# Patient Record
Sex: FEMALE | Race: BLACK OR AFRICAN AMERICAN | Employment: OTHER | URBAN - METROPOLITAN AREA
[De-identification: names, ages, dates, MRNs, and addresses within clinical notes are randomized per-mention and may not be internally consistent; named-entity substitution may affect disease eponyms.]

---

## 2017-03-01 ENCOUNTER — OFFICE VISIT (OUTPATIENT)
Dept: INTERNAL MEDICINE CLINIC | Age: 72
End: 2017-03-01

## 2017-03-01 VITALS
OXYGEN SATURATION: 97 % | HEART RATE: 69 BPM | TEMPERATURE: 97.7 F | WEIGHT: 176.8 LBS | BODY MASS INDEX: 30.19 KG/M2 | RESPIRATION RATE: 16 BRPM | HEIGHT: 64 IN | DIASTOLIC BLOOD PRESSURE: 75 MMHG | SYSTOLIC BLOOD PRESSURE: 135 MMHG

## 2017-03-01 DIAGNOSIS — I11.9 HYPERTENSIVE HEART DISEASE WITHOUT HEART FAILURE: Chronic | ICD-10-CM

## 2017-03-01 DIAGNOSIS — E03.9 HYPOTHYROIDISM, UNSPECIFIED TYPE: Chronic | ICD-10-CM

## 2017-03-01 DIAGNOSIS — E11.9 CONTROLLED TYPE 2 DIABETES MELLITUS WITHOUT COMPLICATION, WITHOUT LONG-TERM CURRENT USE OF INSULIN (HCC): Primary | Chronic | ICD-10-CM

## 2017-03-01 LAB
GLUCOSE POC: 100 MG/DL
HBA1C MFR BLD HPLC: 5.8 %

## 2017-03-01 NOTE — PROGRESS NOTES
Chief Complaint   Patient presents with    Cholesterol Problem    Diabetes    Hypertension    Thyroid Problem       Pt preferred language for health care discussion is english. Is someone accompanying this pt? no    Is the patient using any DME equipment during OV? no    Depression Screening completed. Yes    Learning Assessment completed. Yes    Abuse Screening completed. Yes    Health Maintenance reviewed and discussed per provider. Yes    Pt is due for  Diabetic eye exam.  Please order/place referral if appropriate. Advance Directive:  1. Do you have an advance directive in place? Patient Reply:no    2. If not, would you like material regarding how to put one in place? Patient Reply: No    Coordination of Care:  1. Have you been to the ER, urgent care clinic since your last visit? Hospitalized since your last visit? no    2. Have you seen or consulted any other health care providers outside of the [address] [place] [address] since your last visit? Include any pap smears or colon screening.  no

## 2017-03-01 NOTE — PROGRESS NOTES
HISTORY OF PRESENT ILLNESS  Yogi Mccormack is a 66 y.o. female. Visit Vitals    /75    Pulse 69    Temp 97.7 °F (36.5 °C) (Oral)    Resp 16    Ht 5' 4\" (1.626 m)    Wt 176 lb 12.8 oz (80.2 kg)    SpO2 97%    BMI 30.35 kg/m2       Cholesterol Problem   The history is provided by the patient. This is a chronic problem. The current episode started more than 1 week ago. The problem occurs daily. The problem has not changed since onset. Exacerbated by: diet. The symptoms are relieved by medications (diet). Treatments tried: see meds. The treatment provided moderate relief. Diabetes   The history is provided by the patient. This is a chronic problem. The current episode started more than 1 week ago. The problem occurs daily. The problem has not changed since onset. Exacerbated by: diet. The symptoms are relieved by medications (diet). Treatments tried: see med list.   Hypertension    The history is provided by the patient. This is a chronic problem. The current episode started more than 1 week ago. The problem has not changed since onset. Malaise/fatigue: some fatigue. Associated symptoms comments: None  . There are no associated agents to hypertension. Risk factors include postmenopause and diabetes mellitus. Thyroid Problem   The history is provided by the patient. This is a chronic problem. The current episode started more than 1 week ago. The problem occurs daily. The symptoms are relieved by medications. Treatments tried: synthroid. The treatment provided moderate relief. Review of Systems   Constitutional: Negative. Malaise/fatigue: some fatigue. Respiratory: Negative. Cardiovascular: Negative. Neurological: Negative. Physical Exam   Constitutional: She is oriented to person, place, and time. She appears well-developed and well-nourished. No distress. Cardiovascular: Normal rate.     Pulmonary/Chest: Effort normal.   Neurological: She is alert and oriented to person, place, and time.   Skin: Skin is warm and dry. She is not diaphoretic. Psychiatric: She has a normal mood and affect. Nursing note and vitals reviewed. ASSESSMENT and PLAN    ICD-10-CM ICD-9-CM    1. Controlled type 2 diabetes mellitus without complication, without long-term current use of insulin (HCC) E11.9 250.00 AMB POC HEMOGLOBIN A1C      AMB POC GLUCOSE BLOOD, BY GLUCOSE MONITORING DEVICE   2. Hypothyroidism, unspecified type E03.9 244.9    3. Hypertensive heart disease without heart failure I11.9 402.90        BS has been diet controlled. She monitors at [facility] once a month--last time was 74. BP controlled. Last 2 thyroids and chol have been good    Will be having blood at the nephrologists office also    F/u 4 months         mid-day, with HBA1c 5.6 (even better than last). Can be a little more liberal with diet--advised her is it OK to be a bit more liberal with her diet.

## 2017-03-01 NOTE — MR AVS SNAPSHOT
Visit Information Date & Time Provider Department Dept. Phone Encounter #  
 3/1/2017  3:45 PM [address] [phone] [id number] Follow-up Instructions Return in about 4 months (around 7/1/2017) for HTN, DM, cholesterol, thryoid disorder. Upcoming Health Maintenance Date Due  
 EYE EXAM RETINAL OR DILATED Q1 12/3/2016 HEMOGLOBIN A1C Q6M 5/1/2017 FOOT EXAM Q1 7/28/2017 MICROALBUMIN Q1 7/28/2017 LIPID PANEL Q1 11/1/2017 MEDICARE YEARLY EXAM 11/2/2017 GLAUCOMA SCREENING Q2Y 12/3/2017 BREAST CANCER SCRN MAMMOGRAM 3/24/2018 COLONOSCOPY 7/12/2023 DTaP/Tdap/Td series (2 - Td) 5/19/2025 Allergies as of 3/1/2017  Review Complete On: 3/1/2017 By: Eric Lantigua MD  
  
 Severity Noted Reaction Type Reactions Amoxicillin  12/18/2014    Rash Ampicillin  03/04/2015    Rash Codeine  11/15/2011   Side Effect Nausea and Vomiting Tussionex  01/15/2014   Side Effect Other (comments) Hallucinations Valium [Diazepam]  11/15/2011   Systemic Vertigo Current Immunizations  Reviewed on 1/27/2014 Name Date Influenza Vaccine 9/30/2013 10:40 AM  
 Pneumococcal Conjugate (PCV-13) 11/1/2016 11:59 AM  
 Pneumococcal Polysaccharide (PPSV-23) 1/27/2014  9:20 AM  
 Tdap 5/19/2015 Not reviewed this visit You Were Diagnosed With   
  
 Codes Comments Controlled type 2 diabetes mellitus without complication, without long-term current use of insulin ([facility] [address]    -  Primary ICD-10-CM: E11.9 ICD-9-CM: 250.00 Hypothyroidism, unspecified type     ICD-10-CM: E03.9 ICD-9-CM: 244.9 Hypertensive heart disease without heart failure     ICD-10-CM: I11.9 ICD-9-CM: 402.90 Vitals BP  
  
  
  
  
  
 135/75 BMI and BSA Data Body Mass Index Body Surface Area  
 30.35 kg/m 2 1.9 m 2 Preferred Pharmacy Pharmacy Name Phone [facility], [address] [phone] Your Updated Medication List  
  
   
This list is accurate as of: 3/1/17  4:45 PM.  Always use your most recent med list.  
  
  
  
  
 BABY ASPIRIN PO Take 81 mg by mouth.  
  
 ergocalciferol 50,000 unit capsule Commonly known as:  ERGOCALCIFEROL Take 50,000 Units by mouth.  
  
 loratadine 10 mg tablet Commonly known as:  Jearline Araseli Take 1 Tab by mouth daily. metoprolol tartrate 50 mg tablet Commonly known as:  LOPRESSOR Take one tablet by mouth one time daily  
  
 simvastatin 40 mg tablet Commonly known as:  ZOCOR  
TAKE ONE TABLET BY MOUTH IN THE EVENING  
  
 SINGULAIR PO Take  by mouth. spironolactone 50 mg tablet Commonly known as:  ALDACTONE Take 1 Tab by mouth daily. SYNTHROID 100 mcg tablet Generic drug:  levothyroxine Take 1 Tab by mouth daily. vitamin E 400 unit capsule Commonly known as:  [address] Take  by mouth daily. We Performed the Following AMB POC GLUCOSE BLOOD, BY GLUCOSE MONITORING DEVICE [89905 CPT(R)] AMB POC HEMOGLOBIN A1C [23050 CPT(R)] Follow-up Instructions Return in about 4 months (around 7/1/2017) for HTN, DM, cholesterol, thryoid disorder. Introducing [facility] & HEALTH SERVICES! Dear Santiago Wells: Thank you for requesting a [employer] account. Our records indicate that you already have an active [employer] account. You can access your account anytime at [email or web address] Did you know that you can access your hospital and ER discharge instructions at any time in [employer]? You can also review all of your test results from your hospital stay or ER visit. Additional Information If you have questions, please visit the Frequently Asked Questions section of the [employer] website at [email or web address] Remember, [employer] is NOT to be used for urgent needs. For medical emergencies, dial 911. Now available from your iPhone and Android! Please provide this summary of care documentation to your next provider. Your primary care clinician is listed as [facility]. If you have any questions after today's visit, please call [phone].

## 2017-03-11 ENCOUNTER — OFFICE VISIT (OUTPATIENT)
Dept: INTERNAL MEDICINE CLINIC | Age: 72
End: 2017-03-11

## 2017-03-11 VITALS
BODY MASS INDEX: 30.73 KG/M2 | RESPIRATION RATE: 16 BRPM | HEIGHT: 64 IN | WEIGHT: 180 LBS | DIASTOLIC BLOOD PRESSURE: 76 MMHG | HEART RATE: 67 BPM | OXYGEN SATURATION: 99 % | SYSTOLIC BLOOD PRESSURE: 129 MMHG | TEMPERATURE: 96.8 F

## 2017-03-11 DIAGNOSIS — S16.1XXA NECK MUSCLE STRAIN, INITIAL ENCOUNTER: Primary | ICD-10-CM

## 2017-03-11 RX ORDER — CYCLOBENZAPRINE HCL 10 MG
10 TABLET ORAL
Qty: 7 TAB | Refills: 0 | Status: SHIPPED | OUTPATIENT
Start: 2017-03-11 | End: 2017-12-05

## 2017-03-11 NOTE — MR AVS SNAPSHOT
Visit Information Date & Time Provider Department Dept. Phone Encounter #  
 3/11/2017 10:15 AM Tenisha Carmona, [address] [facility] [place] [id number] Follow-up Instructions Return if symptoms worsen or fail to improve. Your Appointments 7/3/2017  9:30 AM  
Office Visit with Waldemar Summers, [address] ([facility]) Appt Note: 4 month f/u HTN, cholestrol, thyroid disoder [address]  
  
   
 [address] [facility] Upcoming Health Maintenance Date Due  
 EYE EXAM RETINAL OR DILATED Q1 12/3/2016 FOOT EXAM Q1 7/28/2017 MICROALBUMIN Q1 7/28/2017 HEMOGLOBIN A1C Q6M 9/1/2017 LIPID PANEL Q1 11/1/2017 MEDICARE YEARLY EXAM 11/2/2017 GLAUCOMA SCREENING Q2Y 12/3/2017 BREAST CANCER SCRN MAMMOGRAM 3/24/2018 COLONOSCOPY 7/12/2023 DTaP/Tdap/Td series (2 - Td) 5/19/2025 Allergies as of 3/11/2017  Review Complete On: 3/11/2017 By: Tenisha Carmona DO Severity Noted Reaction Type Reactions Amoxicillin  12/18/2014    Rash Ampicillin  03/04/2015    Rash Codeine  11/15/2011   Side Effect Nausea and Vomiting Tussionex  01/15/2014   Side Effect Other (comments) Hallucinations Valium [Diazepam]  11/15/2011   Systemic Vertigo Current Immunizations  Reviewed on 1/27/2014 Name Date Influenza Vaccine 9/30/2013 10:40 AM  
 Pneumococcal Conjugate (PCV-13) 11/1/2016 11:59 AM  
 Pneumococcal Polysaccharide (PPSV-23) 1/27/2014  9:20 AM  
 Tdap 5/19/2015 Not reviewed this visit You Were Diagnosed With   
  
 Codes Comments Neck muscle strain, initial encounter    -  Primary ICD-10-CM: S16. Domingo Carbajal ICD-9-CM: 624. 0 Vitals BP Pulse Temp Resp Height(growth percentile) Weight(growth percentile)  129/76 (BP 1 Location: Left arm, BP Patient Position: Sitting) 67 96.8 °F (36 °C) (Oral) 16 5' 4\" (1.626 m) 180 lb (81.6 kg) SpO2 BMI OB Status Smoking Status 99% 30.9 kg/m2 Hysterectomy Never Smoker Vitals History BMI and BSA Data Body Mass Index Body Surface Area 30.9 kg/m 2 1.92 m 2 Preferred Pharmacy Pharmacy Name Phone [facility],[address], [address] [phone] Your Updated Medication List  
  
   
This list is accurate as of: 3/11/17 11:13 AM.  Always use your most recent med list.  
  
  
  
  
 BABY ASPIRIN PO Take 81 mg by mouth. cyclobenzaprine 10 mg tablet Commonly known as:  FLEXERIL Take 1 Tab by mouth nightly. Indications: MUSCLE SPASM  
  
 ergocalciferol 50,000 unit capsule Commonly known as:  ERGOCALCIFEROL Take 50,000 Units by mouth every seven (7) days. metoprolol tartrate 50 mg tablet Commonly known as:  LOPRESSOR Take one tablet by mouth one time daily  
  
 simvastatin 40 mg tablet Commonly known as:  ZOCOR  
TAKE ONE TABLET BY MOUTH IN THE EVENING  
  
 spironolactone 50 mg tablet Commonly known as:  ALDACTONE Take 1 Tab by mouth daily. SYNTHROID 100 mcg tablet Generic drug:  levothyroxine Take 1 Tab by mouth daily. Prescriptions Sent to Pharmacy Refills  
 cyclobenzaprine (FLEXERIL) 10 mg tablet 0 Sig: Take 1 Tab by mouth nightly. Indications: MUSCLE SPASM Class: Normal  
 Pharmacy: [facility] [address]  #: [phone] Route: Oral  
  
Follow-up Instructions Return if symptoms worsen or fail to improve. Patient Instructions Recommend Tylenol 1000mg every 6 hours for pain. Recommend OTC Epsom salt rub gel for pain/inflammation ([employer]ure brand or equivalent). Return in 4 weeks if symptoms persist or sooner if working. Neck Strain or Sprain: Rehab Exercises Your Care Instructions Here are some examples of typical rehabilitation exercises for your condition. Start each exercise slowly. Ease off the exercise if you start to have pain. Your doctor or physical therapist will tell you when you can start these exercises and which ones will work best for you. How to do the exercises Neck rotation 1. Sit in a firm chair, or stand up straight. 2. Keeping your chin level, turn your head to the right, and hold for 15 to 30 seconds. 3. Turn your head to the left and hold for 15 to 30 seconds. 4. Repeat 2 to 4 times to each side. Neck stretches 1. Look straight ahead, and tip your right ear to your right shoulder. Do not let your left shoulder rise up as you tip your head to the right. 2. Hold for 15 to 30 seconds. 3. Tilt your head to the left. Do not let your right shoulder rise up as you tip your head to the left. 4. Hold for 15 to 30 seconds. 5. Repeat 2 to 4 times to each side. Forward neck flexion 1. Sit in a firm chair, or stand up straight. 2. Bend your head forward. 3. Hold for 15 to 30 seconds. 4. Repeat 2 to 4 times. Lateral (side) bend strengthening 1. With your right hand, place your first two fingers on your right temple. 2. Start to bend your head to the side while using gentle pressure from your fingers to keep your head from bending. 3. Hold for about 6 seconds. 4. Repeat 8 to 12 times. 5. Switch hands and repeat the same exercise on your left side. Forward bend strengthening 1. Place your first two fingers of either hand on your forehead. 2. Start to bend your head forward while using gentle pressure from your fingers to keep your head from bending. 3. Hold for about 6 seconds. 4. Repeat 8 to 12 times. Neutral position strengthening 1. Using one hand, place your fingertips on the back of your head at the top of your neck. 2. Start to bend your head backward while using gentle pressure from your fingers to keep your head from bending. 3. Hold for about 6 seconds. 4. Repeat 8 to 12 times. Chin tuck 1. Lie on the floor with a rolled-up towel under your neck. Your head should be touching the floor. 2. Slowly bring your chin toward your chest. 
3. Hold for a count of 6, and then relax for up to 10 seconds. 4. Repeat 8 to 12 times. Follow-up care is a key part of your treatment and safety. Be sure to make and go to all appointments, and call your doctor if you are having problems. It's also a good idea to know your test results and keep a list of the medicines you take. Where can you learn more? Go to http://amne-isabella.info/. Enter M679 in the search box to learn more about \"Neck Strain or Sprain: Rehab Exercises. \" Current as of: May 23, 2016 Content Version: 11.1 © 5321-9434 [employer]. Care instructions adapted under license by [employer] (which disclaims liability or warranty for this information). If you have questions about a medical condition or this instruction, always ask your healthcare professional. [place] [address] any warranty or liability for your use of this information. Neck Strain: Care Instructions Your Care Instructions You have strained the muscles and ligaments in your neck. A sudden, awkward movement can strain the neck. This often occurs with falls or car accidents or during certain sports. Everyday activities like working on a computer or sleeping can also cause neck strain if they force you to hold your neck in an awkward position for a long time. It is common for neck pain to get worse for a day or two after an injury, but it should start to feel better after that. You may have more pain and stiffness for several days before it gets better. This is expected. It may take a few weeks or longer for it to heal completely. Good home treatment can help you get better faster and avoid future neck problems. Follow-up care is a key part of your treatment and safety.  Be sure to make and go to all appointments, and call your doctor if you are having problems. It's also a good idea to know your test results and keep a list of the medicines you take. How can you care for yourself at home? · If you were given a neck brace (cervical collar) to limit neck motion, wear it as instructed for as many days as your doctor tells you to. Do not wear it longer than you were told to. Wearing a brace for too long can make neck stiffness worse and weaken the neck muscles. · You can try using heat or ice to see if it helps. ¨ Try using a heating pad on a low or medium setting for 15 to 20 minutes every 2 to 3 hours. Try a warm shower in place of one session with the heating pad. You can also buy single-use heat wraps that last up to 8 hours. ¨ You can also try an ice pack for 10 to 15 minutes every 2 to 3 hours. · Take pain medicines exactly as directed. ¨ If the doctor gave you a prescription medicine for pain, take it as prescribed. ¨ If you are not taking a prescription pain medicine, ask your doctor if you can take an over-the-counter medicine. · Gently rub the area to relieve pain and help with blood flow. Do not massage the area if it hurts to do so. · Do not do anything that makes the pain worse. Take it easy for a couple of days. You can do your usual activities if they do not hurt your neck or put it at risk for more stress or injury. · Try sleeping on a special neck pillow. Place it under your neck, not under your head. Placing a tightly rolled-up towel under your neck while you sleep will also work. If you use a neck pillow or rolled towel, do not use your regular pillow at the same time. · To prevent future neck pain, do exercises to stretch and strengthen your neck and back. Learn how to use good posture, safe lifting techniques, and proper body mechanics. When should you call for help? Call 911 anytime you think you may need emergency care. For example, call if: · You are unable to move an arm or a leg at all. Call your doctor now or seek immediate medical care if: 
· You have new or worse symptoms in your arms, legs, chest, belly, or buttocks. Symptoms may include: ¨ Numbness or tingling. ¨ Weakness. ¨ Pain. · You lose bladder or bowel control. Watch closely for changes in your health, and be sure to contact your doctor if: 
· You are not getting better as expected. Where can you learn more? Go to http://mane-isabella.info/. Enter M253 in the search box to learn more about \"Neck Strain: Care Instructions. \" Current as of: May 23, 2016 Content Version: 11.1 © 4865-4730 [employer]. Care instructions adapted under license by [employer] (which disclaims liability or warranty for this information). If you have questions about a medical condition or this instruction, always ask your healthcare professional. [place] [address] any warranty or liability for your use of this information. Introducing [facility] & HEALTH SERVICES! Dear Terra Expose: Thank you for requesting a [facility] [facility] account. Our records indicate that you already have an active [facility] [facility] account. You can access your account anytime at [email or web address] Did you know that you can access your hospital and ER discharge instructions at any time in [employer]? You can also review all of your test results from your hospital stay or ER visit. Additional Information If you have questions, please visit the Frequently Asked Questions section of the [employer] website at [email or web address] Remember, [facility] [facility] is NOT to be used for urgent needs. For medical emergencies, dial 911. Now available from your iPhone and Android! Please provide this summary of care documentation to your next provider. Your primary care clinician is listed as [facility].  If you have any questions after today's visit, please call [phone].

## 2017-03-11 NOTE — PROGRESS NOTES
Patient presents today with C/O neck, patient reports she woke up with neck pain yesterday morning. Pt preferred language for healthcare discussion is english. Do you have an advanced directive? No  Is someone accompanying this pt? If so who? No   Is the patient using any DME equipment during OV? No What equipment? 1. Have you been to the ER, urgent care clinic since your last visit? Hospitalized since your last visit?  No

## 2017-03-11 NOTE — PATIENT INSTRUCTIONS
Recommend Tylenol 1000mg every 6 hours for pain. Recommend OTC Epsom salt rub gel for pain/inflammation (Procure brand or equivalent). Return in 4 weeks if symptoms persist or sooner if working. Neck Strain or Sprain: Rehab Exercises  Your Care Instructions  Here are some examples of typical rehabilitation exercises for your condition. Start each exercise slowly. Ease off the exercise if you start to have pain. Your doctor or physical therapist will tell you when you can start these exercises and which ones will work best for you. How to do the exercises  Neck rotation    1. Sit in a firm chair, or stand up straight. 2. Keeping your chin level, turn your head to the right, and hold for 15 to 30 seconds. 3. Turn your head to the left and hold for 15 to 30 seconds. 4. Repeat 2 to 4 times to each side. Neck stretches    1. Look straight ahead, and tip your right ear to your right shoulder. Do not let your left shoulder rise up as you tip your head to the right. 2. Hold for 15 to 30 seconds. 3. Tilt your head to the left. Do not let your right shoulder rise up as you tip your head to the left. 4. Hold for 15 to 30 seconds. 5. Repeat 2 to 4 times to each side. Forward neck flexion    1. Sit in a firm chair, or stand up straight. 2. Bend your head forward. 3. Hold for 15 to 30 seconds. 4. Repeat 2 to 4 times. Lateral (side) bend strengthening    1. With your right hand, place your first two fingers on your right temple. 2. Start to bend your head to the side while using gentle pressure from your fingers to keep your head from bending. 3. Hold for about 6 seconds. 4. Repeat 8 to 12 times. 5. Switch hands and repeat the same exercise on your left side. Forward bend strengthening    1. Place your first two fingers of either hand on your forehead. 2. Start to bend your head forward while using gentle pressure from your fingers to keep your head from bending. 3. Hold for about 6 seconds.   4. Repeat 8 to 12 times. Neutral position strengthening    1. Using one hand, place your fingertips on the back of your head at the top of your neck. 2. Start to bend your head backward while using gentle pressure from your fingers to keep your head from bending. 3. Hold for about 6 seconds. 4. Repeat 8 to 12 times. Chin tuck    1. Lie on the floor with a rolled-up towel under your neck. Your head should be touching the floor. 2. Slowly bring your chin toward your chest.  3. Hold for a count of 6, and then relax for up to 10 seconds. 4. Repeat 8 to 12 times. Follow-up care is a key part of your treatment and safety. Be sure to make and go to all appointments, and call your doctor if you are having problems. It's also a good idea to know your test results and keep a list of the medicines you take. Where can you learn more? Go to http://mane-isabella.info/. Enter M679 in the search box to learn more about \"Neck Strain or Sprain: Rehab Exercises. \"  Current as of: May 23, 2016  Content Version: 11.1  © 0476-4412 [employer]. Care instructions adapted under license by [employer] (which disclaims liability or warranty for this information). If you have questions about a medical condition or this instruction, always ask your healthcare professional. [address] any warranty or liability for your use of this information. Neck Strain: Care Instructions  Your Care Instructions  You have strained the muscles and ligaments in your neck. A sudden, awkward movement can strain the neck. This often occurs with falls or car accidents or during certain sports. Everyday activities like working on a computer or sleeping can also cause neck strain if they force you to hold your neck in an awkward position for a long time. It is common for neck pain to get worse for a day or two after an injury, but it should start to feel better after that.  You may have more pain and stiffness for several days before it gets better. This is expected. It may take a few weeks or longer for it to heal completely. Good home treatment can help you get better faster and avoid future neck problems. Follow-up care is a key part of your treatment and safety. Be sure to make and go to all appointments, and call your doctor if you are having problems. It's also a good idea to know your test results and keep a list of the medicines you take. How can you care for yourself at home? · If you were given a neck brace (cervical collar) to limit neck motion, wear it as instructed for as many days as your doctor tells you to. Do not wear it longer than you were told to. Wearing a brace for too long can make neck stiffness worse and weaken the neck muscles. · You can try using heat or ice to see if it helps. ¨ Try using a heating pad on a low or medium setting for 15 to 20 minutes every 2 to 3 hours. Try a warm shower in place of one session with the heating pad. You can also buy single-use heat wraps that last up to 8 hours. ¨ You can also try an ice pack for 10 to 15 minutes every 2 to 3 hours. · Take pain medicines exactly as directed. ¨ If the doctor gave you a prescription medicine for pain, take it as prescribed. ¨ If you are not taking a prescription pain medicine, ask your doctor if you can take an over-the-counter medicine. · Gently rub the area to relieve pain and help with blood flow. Do not massage the area if it hurts to do so. · Do not do anything that makes the pain worse. Take it easy for a couple of days. You can do your usual activities if they do not hurt your neck or put it at risk for more stress or injury. · Try sleeping on a special neck pillow. Place it under your neck, not under your head. Placing a tightly rolled-up towel under your neck while you sleep will also work. If you use a neck pillow or rolled towel, do not use your regular pillow at the same time.   · To prevent future neck pain, do exercises to stretch and strengthen your neck and back. Learn how to use good posture, safe lifting techniques, and proper body mechanics. When should you call for help? Call 911 anytime you think you may need emergency care. For example, call if:  · You are unable to move an arm or a leg at all. Call your doctor now or seek immediate medical care if:  · You have new or worse symptoms in your arms, legs, chest, belly, or buttocks. Symptoms may include:  ¨ Numbness or tingling. ¨ Weakness. ¨ Pain. · You lose bladder or bowel control. Watch closely for changes in your health, and be sure to contact your doctor if:  · You are not getting better as expected. Where can you learn more? Go to http://mane-isabella.info/. Enter M253 in the search box to learn more about \"Neck Strain: Care Instructions. \"  Current as of: May 23, 2016  Content Version: 11.1  © 1813-1808 [employer]. Care instructions adapted under license by [employer] (which disclaims liability or warranty for this information). If you have questions about a medical condition or this instruction, always ask your healthcare professional. [address] any warranty or liability for your use of this information.

## 2017-03-11 NOTE — PROGRESS NOTES
SUBJECTIVE:   HPI:  Ashley Araujo is a 66 y.o. female who complains of neck pain. Patient states that she woke up yesterday morning with neck pain and stiffness. Denies any trauma/falls. Tried heat some heat, and tylenol yesterday. Still having pain and stiffness. Limited ROM due to pain. ROS:    · General: negative for chills, fever, weight changes or malaise  · Respiratory: no cough, shortness of breath, or wheezing  · Cardiovascular: no chest pain, palpitations, or dyspnea on exertion  · Gastrointestinal: no GERD or history of PUD, abdominal pain, N/V, change in bowel habits, or black or bloody stools  · Musculoskeletal: no muscle weakness  · Neurological: no numbness, tingling, headache or dizziness    Past Medical History:   Diagnosis Date    CRI (chronic renal insufficiency)     stage 3    Diabetes mellitus, type 2 ([facility] [address] 2/2016    by HBA1c    Disease of inner ear     GERD (gastroesophageal reflux disease) 12/2/2011    Hypercholesterolemia     Hypertension     Hypothyroidism 12/2/2011     Past Surgical History:   Procedure Laterality Date    BIOPSY OF BREAST, INCISIONAL  1995    left    HX BREAST REDUCTION      bilateral    HX CARPAL TUNNEL RELEASE      bilateral     HX COLONOSCOPY  7/12/13    no f/u, Dr. Marizol Jarvis      right tympanicplastty    HX HYSTERECTOMY      partial    HX ORTHOPAEDIC      right ulna    HX ORTHOPAEDIC      bilateral trigger finger releases     Outpatient Prescriptions Marked as Taking for the 3/11/17 encounter (Office Visit) with Ruthy Campbell, DO   Medication Sig Dispense Refill    spironolactone (ALDACTONE) 50 mg tablet Take 1 Tab by mouth daily. 90 Tab 3    metoprolol tartrate (LOPRESSOR) 50 mg tablet Take one tablet by mouth one time daily 90 Tab 3    SYNTHROID 100 mcg tablet Take 1 Tab by mouth daily. 90 Tab 5    ergocalciferol (ERGOCALCIFEROL) 50,000 unit capsule Take 50,000 Units by mouth every seven (7) days.       simvastatin (ZOCOR) 40 mg tablet TAKE ONE TABLET BY MOUTH IN THE EVENING  90 Tab 5    BABY ASPIRIN PO Take 81 mg by mouth. Allergies   Allergen Reactions    Amoxicillin Rash    Ampicillin Rash    Codeine Nausea and Vomiting    Tussionex Other (comments)     Hallucinations    Valium [Diazepam] Vertigo       OBJECTIVE:  Visit Vitals    /76 (BP 1 Location: Left arm, BP Patient Position: Sitting)    Pulse 67    Temp 96.8 °F (36 °C) (Oral)    Resp 16    Ht 5' 4\" (1.626 m)    Wt 180 lb (81.6 kg)    SpO2 99%    BMI 30.9 kg/m2      GEN: awake, alert, orientated, in no acute distress  NECK: No lymphadenopathy, no appearing thyroid, tenderness to left trapezius, spasm  CV:  The heart sounds are regular in rate and rhythm. There is a normal S1 and S2. There or no murmurs, rubs, or gallops. Distal pulses are intact and equal. No peripheral edema. Lungs:  Lung sounds are clear and equal to auscultation throughout all lung fields. ASSESSMENT/PLAN:     1. Neck muscle strain, initial encounter - no trauma. Recommend Tylenol 1000mg every 6 hours for pain. Recommend OTC Epsom salt rub gel for pain/inflammation (Procure brand or equivalent). Gave handout for neck strain rehab exercises. Trial of flexeril at bedtime for pain/sleep. Return in 4 weeks if symptoms persist or sooner if working.   - cyclobenzaprine (FLEXERIL) 10 mg tablet; Take 1 Tab by mouth nightly. Indications: MUSCLE SPASM  Dispense: 7 Tab;  Refill: 0

## 2017-06-12 DIAGNOSIS — Z76.0 MEDICATION REFILL: ICD-10-CM

## 2017-06-12 RX ORDER — LEVOTHYROXINE SODIUM 100 UG/1
100 TABLET ORAL DAILY
Qty: 90 TAB | Refills: 5 | Status: SHIPPED | OUTPATIENT
Start: 2017-06-12 | End: 2017-12-27 | Stop reason: SDUPTHER

## 2017-06-12 RX ORDER — SIMVASTATIN 40 MG/1
TABLET, FILM COATED ORAL
Qty: 90 TAB | Refills: 5 | Status: SHIPPED | OUTPATIENT
Start: 2017-06-12 | End: 2018-06-16 | Stop reason: SDUPTHER

## 2017-06-12 NOTE — TELEPHONE ENCOUNTER
Requested Prescriptions     Pending Prescriptions Disp Refills    simvastatin (ZOCOR) 40 mg tablet 90 Tab 5    SYNTHROID 100 mcg tablet 90 Tab 5     Sig: Take 1 Tab by mouth daily.

## 2017-07-13 ENCOUNTER — OFFICE VISIT (OUTPATIENT)
Dept: INTERNAL MEDICINE CLINIC | Age: 72
End: 2017-07-13

## 2017-07-13 VITALS
BODY MASS INDEX: 31.18 KG/M2 | HEART RATE: 59 BPM | TEMPERATURE: 97.9 F | DIASTOLIC BLOOD PRESSURE: 54 MMHG | SYSTOLIC BLOOD PRESSURE: 127 MMHG | RESPIRATION RATE: 18 BRPM | WEIGHT: 182.6 LBS | HEIGHT: 64 IN | OXYGEN SATURATION: 98 %

## 2017-07-13 DIAGNOSIS — I11.9 HYPERTENSIVE HEART DISEASE WITHOUT HEART FAILURE: Chronic | ICD-10-CM

## 2017-07-13 DIAGNOSIS — E78.00 HYPERCHOLESTEROLEMIA: Chronic | ICD-10-CM

## 2017-07-13 DIAGNOSIS — E03.9 HYPOTHYROIDISM, UNSPECIFIED TYPE: Chronic | ICD-10-CM

## 2017-07-13 DIAGNOSIS — Z76.0 MEDICATION REFILL: ICD-10-CM

## 2017-07-13 DIAGNOSIS — E11.9 CONTROLLED TYPE 2 DIABETES MELLITUS WITHOUT COMPLICATION, WITHOUT LONG-TERM CURRENT USE OF INSULIN (HCC): Primary | Chronic | ICD-10-CM

## 2017-07-13 DIAGNOSIS — R55 PRE-SYNCOPE: ICD-10-CM

## 2017-07-13 RX ORDER — METOPROLOL TARTRATE 50 MG/1
TABLET ORAL
Qty: 90 TAB | Refills: 3 | Status: SHIPPED | OUTPATIENT
Start: 2017-07-13 | End: 2018-06-19 | Stop reason: SDUPTHER

## 2017-07-13 RX ORDER — LORATADINE 10 MG/1
10 TABLET ORAL
COMMUNITY
End: 2019-01-29 | Stop reason: SDUPTHER

## 2017-07-13 RX ORDER — MONTELUKAST SODIUM 10 MG/1
TABLET ORAL
Refills: 12 | COMMUNITY
Start: 2017-05-30 | End: 2018-11-07

## 2017-07-13 RX ORDER — SPIRONOLACTONE 50 MG/1
50 TABLET, FILM COATED ORAL DAILY
Qty: 90 TAB | Refills: 3 | Status: SHIPPED | OUTPATIENT
Start: 2017-07-13 | End: 2018-06-19 | Stop reason: SDUPTHER

## 2017-07-13 NOTE — PROGRESS NOTES
HISTORY OF PRESENT ILLNESS  Armani Gandhi is a 66 y.o. female. Visit Vitals    /54    Pulse (!) 59    Temp 97.9 °F (36.6 °C) (Oral)    Resp 18    Ht 5' 4\" (1.626 m)    Wt 182 lb 9.6 oz (82.8 kg)    SpO2 98%    BMI 31.34 kg/m2       HPI Comments: Pt has h/o diabetes but is on no meds. Her last HBA1c was marginal (as in nearly normal). She has had 2-3 episodes of lightheadedness and feeling queasy when she had not eaten regularly. She does not check her BS--can not get past the thought of sticking herself. Cholesterol Problem   The history is provided by the patient. This is a chronic problem. The current episode started more than 1 week ago. The problem occurs daily. The problem has not changed since onset. Exacerbated by: diet. Relieved by: diet. Diabetes   The history is provided by the patient (see comments). This is a chronic problem. The problem occurs daily. The problem has been resolved. Exacerbated by: diet. Relieved by: diet. Treatments tried: diet only. The treatment provided significant relief. Hypertension    The history is provided by the patient. This is a chronic problem. The current episode started more than 1 week ago. The problem has not changed since onset. Associated symptoms include palpitations and dizziness. There are no associated agents to hypertension. Risk factors include obesity, postmenopause, hypertension, diabetes mellitus and dyslipidemia. Review of Systems   Constitutional: Negative. HENT: Negative. Respiratory: Negative. Cardiovascular: Positive for palpitations. Gastrointestinal:        Queasiness when she did not eat. Neurological: Positive for dizziness. Physical Exam   Constitutional: She is oriented to person, place, and time. She appears well-developed and well-nourished. No distress. Cardiovascular: Normal rate and regular rhythm.     Pulmonary/Chest: Effort normal and breath sounds normal.   Musculoskeletal: She exhibits no edema. Neurological: She is alert and oriented to person, place, and time. Skin: Skin is warm and dry. She is not diaphoretic. Psychiatric: She has a normal mood and affect. Nursing note and vitals reviewed. ASSESSMENT and PLAN    ICD-10-CM ICD-9-CM    1. Controlled type 2 diabetes mellitus without complication, without long-term current use of insulin ([facility]) E87.4 524.94 METABOLIC PANEL, COMPREHENSIVE      LIPID PANEL      HEMOGLOBIN A1C W/O EAG   2. Hypertensive heart disease without heart failure I11.9 402.90 MAGNESIUM      AMB POC EKG ROUTINE W/ 12 LEADS, INTER & REP   3. Hypothyroidism, unspecified type E03.9 244.9 THYROID CASCADE PROFILE   4. Hypercholesterolemia E78.00 272.0 LIPID PANEL      AMB POC EKG ROUTINE W/ 12 LEADS, INTER & REP   5. Pre-syncope R55 780.2 MAGNESIUM      CBC WITH AUTOMATED DIFF      AMB POC EKG ROUTINE W/ 12 LEADS, INTER & REP     I suspect her BS was a little low due to heat and activity and heat    Will  Update lab and EKG.  Consider cardiac stress test if sxs continue    Discussed BMI/weight, lifestyle, diet and exercise    F/u 4 months for MWV            ECG--no significant change from 9/23/14

## 2017-07-13 NOTE — PROGRESS NOTES
Chief Complaint   Patient presents with    Cholesterol Problem    Diabetes     Pt states that her sugars have been dropping. wonders if she is not eatting enough, or waiting too long between meals. Also states that she is getting dizzy and having balance issues    Hypertension       Pt preferred language for health care discussion is [place]. Is someone accompanying this pt? no    Is the patient using any DME equipment during OV? no    Depression Screening:  PHQ over the last two weeks 7/13/2017 3/11/2017 3/1/2017 7/28/2016 11/23/2015 11/17/2014 1/15/2014   Little interest or pleasure in doing things Not at all Not at all Not at all Not at all Not at all Not at all Not at all   Feeling down, depressed or hopeless Not at all Not at all Not at all Not at all Several days Several days Not at all   Total Score PHQ 2 0 0 0 0 1 1 0       Learning Assessment:  Learning Assessment 5/19/2015   PRIMARY LEARNER Patient   HIGHEST LEVEL OF EDUCATION - PRIMARY LEARNER  > [facility] PRIMARY LEARNER NONE   CO-LEARNER CAREGIVER No   PRIMARY LANGUAGE ENGLISH   LEARNER PREFERENCE PRIMARY READING     DEMONSTRATION   ANSWERED BY Patient   RELATIONSHIP SELF       Abuse Screening:  Abuse Screening Questionnaire 5/19/2015   Do you ever feel afraid of your partner? N   Are you in a relationship with someone who physically or mentally threatens you? N   Is it safe for you to go home? Y       Fall Risk  Fall Risk Assessment, last 12 mths 7/13/2017 3/11/2017 3/1/2017 7/28/2016 11/23/2015 11/17/2014 1/15/2014   Able to walk? Yes Yes Yes Yes Yes Yes Yes   Fall in past 12 months? No No No No No No No         Health Maintenance reviewed and discussed per provider. Yes    Pt is due for Diabetic eye exam  Please order/place referral if appropriate. Advance Directive:  1. Do you have an advance directive in place? Patient Reply:no    2. If not, would you like material regarding how to put one in place?  Patient Reply: no    Coordination of Care:  1. Have you been to the ER, urgent care clinic since your last visit? Hospitalized since your last visit? no    2. Have you seen or consulted any other health care providers outside of the [address] [place] [address] since your last visit? Include any pap smears or colon screening.  no

## 2017-07-13 NOTE — MR AVS SNAPSHOT
Visit Information Date & Time Provider Department Dept. Phone Encounter #  
 7/13/2017  9:00 AM [address] [phone] [id number] Follow-up Instructions Return in about 4 months (around 11/13/2017) for Medicare Wellness Visit, HTN, DM, cholesterol. Upcoming Health Maintenance Date Due  
 EYE EXAM RETINAL OR DILATED Q1 12/3/2016 FOOT EXAM Q1 7/28/2017 MICROALBUMIN Q1 7/28/2017 INFLUENZA AGE 9 TO ADULT 8/1/2017 HEMOGLOBIN A1C Q6M 9/1/2017 LIPID PANEL Q1 11/1/2017 MEDICARE YEARLY EXAM 11/2/2017 GLAUCOMA SCREENING Q2Y 12/3/2017 BREAST CANCER SCRN MAMMOGRAM 3/27/2018 COLONOSCOPY 7/12/2023 DTaP/Tdap/Td series (2 - Td) 5/19/2025 Allergies as of 7/13/2017  Review Complete On: 7/13/2017 By: Bria Gillis MD  
  
 Severity Noted Reaction Type Reactions Amoxicillin  12/18/2014    Rash Ampicillin  03/04/2015    Rash Codeine  11/15/2011   Side Effect Nausea and Vomiting Tussionex  01/15/2014   Side Effect Other (comments) Hallucinations Valium [Diazepam]  11/15/2011   Systemic Vertigo Current Immunizations  Reviewed on 1/27/2014 Name Date Influenza Vaccine 9/30/2013 10:40 AM  
 Pneumococcal Conjugate (PCV-13) 11/1/2016 11:59 AM  
 Pneumococcal Polysaccharide (PPSV-23) 1/27/2014  9:20 AM  
 Tdap 5/19/2015 Not reviewed this visit You Were Diagnosed With   
  
 Codes Comments Controlled type 2 diabetes mellitus without complication, without long-term current use of insulin [facility]    -  Primary ICD-10-CM: E11.9 ICD-9-CM: 250.00 Hypertensive heart disease without heart failure     ICD-10-CM: I11.9 ICD-9-CM: 402.90 Hypothyroidism, unspecified type     ICD-10-CM: E03.9 ICD-9-CM: 244.9 Hypercholesterolemia     ICD-10-CM: E78.00 ICD-9-CM: 272.0 Pre-syncope     ICD-10-CM: R55 
ICD-9-CM: 780.2 Medication refill     ICD-10-CM: Z76.0 ICD-9-CM: V68.1 Vitals BP Pulse Temp Resp Height(growth percentile) Weight(growth percentile) 127/54 (!) 59 97.9 °F (36.6 °C) (Oral) 18 5' 4\" (1.626 m) 182 lb 9.6 oz (82.8 kg) SpO2 BMI OB Status Smoking Status 98% 31.34 kg/m2 Hysterectomy Never Smoker BMI and BSA Data Body Mass Index Body Surface Area  
 31.34 kg/m 2 1.93 m 2 Preferred Pharmacy Pharmacy Name Phone [place] [phone] [phone] Your Updated Medication List  
  
   
This list is accurate as of: 7/13/17 10:10 AM.  Always use your most recent med list.  
  
  
  
  
 BABY ASPIRIN PO Take 81 mg by mouth. cyclobenzaprine 10 mg tablet Commonly known as:  FLEXERIL Take 1 Tab by mouth nightly. Indications: MUSCLE SPASM  
  
 ergocalciferol 50,000 unit capsule Commonly known as:  ERGOCALCIFEROL Take 50,000 Units by mouth every seven (7) days. loratadine 10 mg tablet Commonly known as:  Rubin [place] Take 10 mg by mouth.  
  
 metoprolol tartrate 50 mg tablet Commonly known as:  LOPRESSOR Take one tablet by mouth one time daily  
  
 montelukast 10 mg tablet Commonly known as:  SINGULAIR  
TAKE 1 TABLET BY MOUTH NIGHTLY AT BEDTIME  
  
 simvastatin 40 mg tablet Commonly known as:  ZOCOR  
TAKE ONE TABLET BY MOUTH IN THE EVENING  
  
 spironolactone 50 mg tablet Commonly known as:  ALDACTONE Take 1 Tab by mouth daily. SYNTHROID 100 mcg tablet Generic drug:  levothyroxine Take 1 Tab by mouth daily. Prescriptions Sent to Pharmacy Refills  
 spironolactone (ALDACTONE) 50 mg tablet 3 Sig: Take 1 Tab by mouth daily. Class: Normal  
 [facility] [address] #: [phone] Route: Oral  
 metoprolol tartrate (LOPRESSOR) 50 mg tablet 3 Sig: Take one tablet by mouth one time daily  Class: Normal  
 Pharmacy: [facility] [address] [facility] [place] Ph #: [phone] We Performed the Following AMB POC EKG ROUTINE W/ 12 LEADS, INTER & REP [19939 CPT(R)] Follow-up Instructions Return in about 4 months (around 11/13/2017) for Medicare Wellness Visit, HTN, DM, cholesterol. To-Do List   
 07/13/2017 Lab:  CBC WITH AUTOMATED DIFF   
  
 07/13/2017 Lab:  HEMOGLOBIN A1C W/O EAG   
  
 07/13/2017 Lab:  LIPID PANEL   
  
 07/13/2017 Lab:  MAGNESIUM   
  
 07/13/2017 Lab:  METABOLIC PANEL, COMPREHENSIVE   
  
 07/13/2017 Lab:  THYROID CASCADE PROFILE Introducing [facility]! Dear Anastasia Blum: Thank you for requesting a [employer] account. Our records indicate that you already have an active [employer] account. You can access your account anytime at [email or web address] Did you know that you can access your hospital and ER discharge instructions at any time in [employer]? You can also review all of your test results from your hospital stay or ER visit. Additional Information If you have questions, please visit the Frequently Asked Questions section of the [employer] website at [email or web address] Remember, [employer] is NOT to be used for urgent needs. For medical emergencies, dial 911. Now available from your iPhone and Android! Please provide this summary of care documentation to your next provider. Your primary care clinician is listed as [facility]. If you have any questions after today's visit, please call [phone].

## 2017-07-25 ENCOUNTER — TELEPHONE (OUTPATIENT)
Dept: INTERNAL MEDICINE CLINIC | Age: 72
End: 2017-07-25

## 2017-07-26 NOTE — TELEPHONE ENCOUNTER
Spoke with patient confirmed name and . Relayed message below, pt stated that she will be in tomorrow morning.        Be advised

## 2017-07-27 ENCOUNTER — HOSPITAL ENCOUNTER (OUTPATIENT)
Dept: LAB | Age: 72
Discharge: HOME | End: 2017-07-27
Payer: MEDICARE

## 2017-07-27 ENCOUNTER — LAB ONLY (OUTPATIENT)
Dept: INTERNAL MEDICINE CLINIC | Age: 72
End: 2017-07-27

## 2017-07-27 DIAGNOSIS — E11.9 CONTROLLED TYPE 2 DIABETES MELLITUS WITHOUT COMPLICATION, WITHOUT LONG-TERM CURRENT USE OF INSULIN (HCC): Chronic | ICD-10-CM

## 2017-07-27 DIAGNOSIS — E03.9 HYPOTHYROIDISM, UNSPECIFIED TYPE: Chronic | ICD-10-CM

## 2017-07-27 DIAGNOSIS — R55 PRE-SYNCOPE: ICD-10-CM

## 2017-07-27 DIAGNOSIS — E78.00 HYPERCHOLESTEROLEMIA: Chronic | ICD-10-CM

## 2017-07-27 DIAGNOSIS — I11.9 HYPERTENSIVE HEART DISEASE WITHOUT HEART FAILURE: Chronic | ICD-10-CM

## 2017-07-27 LAB
ALBUMIN SERPL BCP-MCNC: 3.8 G/DL (ref 3.4–5)
ALBUMIN/GLOB SERPL: 1 {RATIO} (ref 0.8–1.7)
ALP SERPL-CCNC: 55 U/L (ref 45–117)
ALT SERPL-CCNC: 19 U/L (ref 13–56)
ANION GAP BLD CALC-SCNC: 10 MMOL/L (ref 3–18)
AST SERPL W P-5'-P-CCNC: 16 U/L (ref 15–37)
BASOPHILS # BLD AUTO: 0 K/UL (ref 0–0.06)
BASOPHILS # BLD: 0 % (ref 0–2)
BILIRUB SERPL-MCNC: 0.5 MG/DL (ref 0.2–1)
BUN SERPL-MCNC: 11 MG/DL (ref 7–18)
BUN/CREAT SERPL: 9 (ref 12–20)
CALCIUM SERPL-MCNC: 9.4 MG/DL (ref 8.5–10.1)
CHLORIDE SERPL-SCNC: 108 MMOL/L (ref 100–108)
CHOLEST SERPL-MCNC: 179 MG/DL
CO2 SERPL-SCNC: 26 MMOL/L (ref 21–32)
CREAT SERPL-MCNC: 1.21 MG/DL (ref 0.6–1.3)
DIFFERENTIAL METHOD BLD: ABNORMAL
EOSINOPHIL # BLD: 0.3 K/UL (ref 0–0.4)
EOSINOPHIL NFR BLD: 6 % (ref 0–5)
ERYTHROCYTE [DISTWIDTH] IN BLOOD BY AUTOMATED COUNT: 15.1 % (ref 11.6–14.5)
GLOBULIN SER CALC-MCNC: 3.7 G/DL (ref 2–4)
GLUCOSE SERPL-MCNC: 112 MG/DL (ref 74–99)
HBA1C MFR BLD: 6.4 % (ref 4.2–5.6)
HCT VFR BLD AUTO: 46.1 % (ref 35–45)
HDLC SERPL-MCNC: 47 MG/DL (ref 40–60)
HDLC SERPL: 3.8 {RATIO} (ref 0–5)
HGB BLD-MCNC: 14.8 G/DL (ref 12–16)
LDLC SERPL CALC-MCNC: 95.8 MG/DL (ref 0–100)
LIPID PROFILE,FLP: ABNORMAL
LYMPHOCYTES # BLD AUTO: 44 % (ref 21–52)
LYMPHOCYTES # BLD: 2.3 K/UL (ref 0.9–3.6)
MAGNESIUM SERPL-MCNC: 2 MG/DL (ref 1.6–2.6)
MCH RBC QN AUTO: 27.6 PG (ref 24–34)
MCHC RBC AUTO-ENTMCNC: 32.1 G/DL (ref 31–37)
MCV RBC AUTO: 86 FL (ref 74–97)
MONOCYTES # BLD: 0.4 K/UL (ref 0.05–1.2)
MONOCYTES NFR BLD AUTO: 7 % (ref 3–10)
NEUTS SEG # BLD: 2.3 K/UL (ref 1.8–8)
NEUTS SEG NFR BLD AUTO: 43 % (ref 40–73)
PLATELET # BLD AUTO: 176 K/UL (ref 135–420)
PMV BLD AUTO: 10.9 FL (ref 9.2–11.8)
POTASSIUM SERPL-SCNC: 3.9 MMOL/L (ref 3.5–5.5)
PROT SERPL-MCNC: 7.5 G/DL (ref 6.4–8.2)
RBC # BLD AUTO: 5.36 M/UL (ref 4.2–5.3)
SODIUM SERPL-SCNC: 144 MMOL/L (ref 136–145)
TRIGL SERPL-MCNC: 181 MG/DL (ref ?–150)
TSH W FREE THYROID I,TSHELE: 2.34 UIU/ML (ref 0.36–3.74)
VLDLC SERPL CALC-MCNC: 36.2 MG/DL
WBC # BLD AUTO: 5.3 K/UL (ref 4.6–13.2)

## 2017-07-27 PROCEDURE — 83036 HEMOGLOBIN GLYCOSYLATED A1C: CPT | Performed by: INTERNAL MEDICINE

## 2017-07-27 PROCEDURE — 85025 COMPLETE CBC W/AUTO DIFF WBC: CPT | Performed by: INTERNAL MEDICINE

## 2017-07-27 PROCEDURE — 80061 LIPID PANEL: CPT | Performed by: INTERNAL MEDICINE

## 2017-07-27 PROCEDURE — 83735 ASSAY OF MAGNESIUM: CPT | Performed by: INTERNAL MEDICINE

## 2017-07-27 PROCEDURE — 80053 COMPREHEN METABOLIC PANEL: CPT | Performed by: INTERNAL MEDICINE

## 2017-07-27 PROCEDURE — 84443 ASSAY THYROID STIM HORMONE: CPT | Performed by: INTERNAL MEDICINE

## 2017-07-27 PROCEDURE — 36415 COLL VENOUS BLD VENIPUNCTURE: CPT | Performed by: INTERNAL MEDICINE

## 2017-10-23 ENCOUNTER — HOSPITAL ENCOUNTER (OUTPATIENT)
Dept: PHYSICAL THERAPY | Age: 72
Discharge: HOME | End: 2017-10-23
Payer: MEDICARE

## 2017-10-23 PROCEDURE — 97163 PT EVAL HIGH COMPLEX 45 MIN: CPT

## 2017-10-23 PROCEDURE — 97112 NEUROMUSCULAR REEDUCATION: CPT

## 2017-10-23 PROCEDURE — G8978 MOBILITY CURRENT STATUS: HCPCS

## 2017-10-23 PROCEDURE — G8979 MOBILITY GOAL STATUS: HCPCS

## 2017-10-23 NOTE — PROGRESS NOTES
[facility] PHYSICAL THERAPY AT [facility], [address] [facility] [address] - Phone: [phone]  Fax: [phone] / [address] [address] PHYSICAL THERAPY SERVICES  Patient Name: Bridger Knight :    Medical   Diagnosis: Dizziness [R42] Treatment Diagnosis: Dizziness [R42]   Onset Date: 2017     Referral Source: Lashawn Mcgrath MD Start of Care [facility] [place]): 10/23/2017   Prior Hospitalization: See medical history Provider #: [id number]   Prior Level of Function: Chronic symptoms for past 15 years   Comorbidities: DM, Arthritis, CKD   Medications: Verified on Patient Summary List   The Plan of Care and following information is based on the information from the initial evaluation.    ===========================================================================================  Assessment / key information:  Patient is 67 y.o. yo female who presents to [facility] with diagnosis of Dizziness [R42]. Patient reports dizziness, imbalance, near falls and nausea which first began 15 years ago. She denies falls and vertigo. She has had vestibular rehab multiple times in the past with poor outcome. She has a cane but has not been using it. Upon objective evaluation, patient demonstrates Impaired use of sensory input and balance strategies. She can walk intermittently without support but is not able to stand unsupported. Patient scored 6 on DGI indicating increased risk of fall with ambulation. Lower extremity muscle strength was impaired in both hips at 3/5. Patient scored 72 on FOTO indcating decreased quality of life.   Patient can benefit from PT interventions to decrease r/o fall, improve safety with ADLs, & decrease sx with ADLs & to improve overall functional status.  ==================================================================================  Eval Complexity: History: HIGH Complexity :3+ comorbidities / personal factors will impact the outcome/ POC Exam:HIGH Complexity : 4+ Standardized tests and measures addressing body structure, function, activity limitation and / or participation in recreation  Presentation: HIGH Complexity : Unstable and unpredictable characteristics  Clinical Decision Making:HIGH Complexity : FOTO score of 1- 25 Overall Complexity:HIGH   Problem List: decrease strength, impaired gait/ balance, decrease ADL/ functional abilitiies, decrease activity tolerance, decrease flexibility/ joint mobility and decrease transfer abilities  Treatment Plan may include any combination of the following: Therapeutic exercise, Therapeutic activities, Neuromuscular re-education, Physical agent/modality, Gait/balance training, Manual therapy and Patient education  Patient / Family readiness to learn indicated by: asking questions, trying to perform skills and interest  Persons(s) to be included in education: patient (P)  Barriers to Learning/Limitations: yes; Not compliant with HEP in the past.  Measures taken: Discuss importance of performing daily HEP. Patient Goal (s): \"Awareness of what I need do fo to avoid PT in the future\"   Patient self reported health status: good  Rehabilitation Potential: poor  Short Term Goals: To be accomplished in  4  Weeks:  1) Patient performing daily HEP and educated in fall prevention techniques. 2) Patient will be able to maintain stand unsupported for 30 seconds eyes open to increase safety with ADLs. 3) Patient able to perform 5 repititions of cervical rotation, flexion and extension with no > mild symptoms. 4) Patient to score 45 on DHI indicating improved function. 5) Increase bilateral hip flexion/abduction to 3+/5 to facilitate standing.  Long Term Goals: To be accomplished in  8  Weeks:  1) Patient to improve score on FOTO to 70 indicating improved quality of life. 2) Patient to improve score on DGI to 9/24 indicating decreased fall risk with ambulation.      3) Patient to be independent  with HEP. 4) Patient to score 50 on [facility] [address] indicating improved function. Frequency / Duration:   Patient to be seen  1  times per week for 8  weeks:  Patient / Caregiver education and instruction: activity modification and exercises  G-Codes (GP): Mobility [id number] Current  CL= 60-79%   Goal  CL= 60-79%. The severity rating is based on the Other DGI    Therapist Signature: Dayanara Osborn PT Date: 33/76/5667   Certification Period: 10/23/2017 to 01/22/2018 Time: 3:15 PM   ===========================================================================================  I certify that the above Physical Therapy Services are being furnished while the patient is under my care. I agree with the treatment plan and certify that this therapy is necessary. Physician Signature:        Date:       Time:     Please sign and return to [facility] or you may fax the signed copy to [phone]. Thank you.

## 2017-10-23 NOTE — PROGRESS NOTES
PHYSICAL THERAPY - DAILY TREATMENT NOTE    Patient Name: Romayne Hanlon        Date: 10/23/2017  : 1945   YES Patient  Verified  Visit #:   1   of   8  Insurance: Payor: [employer]s / Plan: [place] MEDICARE PART A & B / Product Type: Medicare /      In time: 2:32 Out time: 3:07   Total Treatment Time: 35     Medicare Time Tracking (below)   Total Timed Codes (min):  10 1:1 Treatment Time:  10     TREATMENT AREA =  Dizziness [R42]    SUBJECTIVE  Pain Level (on 0 to 10 scale):  0  / 10   Medication Changes/New allergies or changes in medical history, any new surgeries or procedures? NO    If yes, update Summary List   Subjective Functional Status/Changes:  []  No changes reported     See medical history          OBJECTIVE      10 min Patient Education:  YES  Reviewed HEP/POC/Goals   []  Progressed/Changed HEP based on: Other Objective/Functional Measures:    See POC     Post Treatment Pain Level (on 0 to 10) scale:   0  / 10     ASSESSMENT  Assessment/Changes in Function:     Justification for Eval Code Complexity:  Patient History : Chronic, DM, CKD, arthritis  Examination see exam   Clinical Presentation: unstable  Clinical Decision Making : [address] [address]       []  See Progress Note/Recertification   Patient will continue to benefit from skilled PT services to modify and progress therapeutic interventions, address functional mobility deficits, address strength deficits, address imbalance/dizziness, instruct in home and community integration and address increased fall risk to attain remaining goals. Progress toward goals / Updated goals:    Initial evaluation completed with home exercise program and education initiated.        PLAN  [x]  Upgrade activities as tolerated YES Continue plan of care   []  Discharge due to :    []  Other:      Therapist: Juan Francisco Minor PT    Date: 10/23/2017 Time: 2:29 PM     Future Appointments  Date Time Provider Arielle Guerrero   10/23/2017 2:30 PM Juan Francisco Minor PT [facility]   11/13/2017 9:30 AM Mary Jane Powell [place] [address] [facility]

## 2017-11-01 ENCOUNTER — HOSPITAL ENCOUNTER (OUTPATIENT)
Dept: PHYSICAL THERAPY | Age: 72
Discharge: HOME | End: 2017-11-01
Payer: MEDICARE

## 2017-11-01 PROCEDURE — 97110 THERAPEUTIC EXERCISES: CPT

## 2017-11-01 NOTE — PROGRESS NOTES
PHYSICAL THERAPY - DAILY TREATMENT NOTE    Patient Name: Hal Motley        Date: 2017  : 1945   YES Patient  Verified  Visit #:   2   of   8  Insurance: Payor: [place] MEDICARE / Plan: VA MEDICARE PART A & B / Product Type: Medicare /      In time: 3:13 pm Out time: 3:46 pm   Total Treatment Time: 33     Medicare Time Tracking (below)   Total Timed Codes (min):  33 1:1 Treatment Time:  33     TREATMENT AREA =  Dizziness [R42]    SUBJECTIVE  Pain Level (on 0 to 10 scale):  2/ 10   Medication Changes/New allergies or changes in medical history, any new surgeries or procedures? NO    If yes, update Summary List   Subjective Functional Status/Changes:  []  No changes reported     \"OK but since last week I have had a lot more ringing and buzzing in my ears. \" pt reports she is not sure if it is from the weather change. Pt reports performing HEP every day but one. OBJECTIVE  Modalities Rationale: na       33 min Therapeutic Exercise:  [x]  See flow sheet   Rationale:      increase ROM, improve coordination, improve balance and increase proprioception to improve the patients ability to decrease fall risk       min Patient Education:  YES  Reviewed HEP   []  Progressed/Changed HEP based on: Other Objective/Functional Measures:    Reviewed written HEP  Modified hold count to 3 seconds for hip ABD/ADD, hold band for sit hip flexion  Add CS flex/ext, B rotation 3x each-produced mild increase in dizziness  Static standing against wall, sit EC, sit to stand- mid-modA     Post Treatment Pain Level (on 0 to 10) scale:   2 / 10     ASSESSMENT  Assessment/Changes in Function:   Pt requires close S in static standing ~ 13 seconds. Min-ModA with sit to stand for proper weight shift forward and Up. Moderate VCs for trunk lean forward and pushing thorough LE into standing. Recommended use of SPC -prefer walker for increased safety. Pt deferred use of AD at this time.  Modified HEP secondary to soreness through B hips since initial visit. []  See Progress Note/Recertification   Patient will continue to benefit from skilled PT services to modify and progress therapeutic interventions, address imbalance/dizziness and instruct in home and community integration to attain remaining goals. Progress toward goals / Updated goals:    First visit from initial evaluation.  Progressed treatment per plan of care     PLAN  []  Upgrade activities as tolerated YES Continue plan of care   []  Discharge due to :    []  Other:      Therapist: Faisal Flores PTA    Date: 11/1/2017 Time: 3:46  PM     Future Appointments  Date Time Provider Arielle Guerrero   11/9/2017 3:00 PM Faisal Flores PTA [facility]   11/13/2017 9:30 AM Alessandro Goodwin MD [address] [facility]   11/16/2017 3:00 PM Faisal lFores PTA [facility]   11/21/2017 3:00 PM Launie Saint, PT [facility]   11/28/2017 3:00 PM Launie Saint, PT [facility]   12/5/2017 3:00 PM Faisal Flores PTA [facility]   12/12/2017 3:00 PM Launie Saint, PT [facility]

## 2017-11-09 ENCOUNTER — HOSPITAL ENCOUNTER (OUTPATIENT)
Dept: PHYSICAL THERAPY | Age: 72
Discharge: HOME | End: 2017-11-09
Payer: MEDICARE

## 2017-11-09 PROCEDURE — 97110 THERAPEUTIC EXERCISES: CPT

## 2017-11-09 PROCEDURE — 97140 MANUAL THERAPY 1/> REGIONS: CPT

## 2017-11-13 ENCOUNTER — OFFICE VISIT (OUTPATIENT)
Dept: INTERNAL MEDICINE CLINIC | Age: 72
End: 2017-11-13

## 2017-11-13 VITALS
DIASTOLIC BLOOD PRESSURE: 72 MMHG | HEIGHT: 64 IN | SYSTOLIC BLOOD PRESSURE: 130 MMHG | BODY MASS INDEX: 31.76 KG/M2 | WEIGHT: 186 LBS | RESPIRATION RATE: 20 BRPM | HEART RATE: 57 BPM | TEMPERATURE: 96.6 F | OXYGEN SATURATION: 98 %

## 2017-11-13 DIAGNOSIS — E03.9 HYPOTHYROIDISM, UNSPECIFIED TYPE: Chronic | ICD-10-CM

## 2017-11-13 DIAGNOSIS — R06.00 DYSPNEA, UNSPECIFIED TYPE: ICD-10-CM

## 2017-11-13 DIAGNOSIS — Z00.00 MEDICARE ANNUAL WELLNESS VISIT, SUBSEQUENT: Primary | ICD-10-CM

## 2017-11-13 DIAGNOSIS — I11.9 HYPERTENSIVE HEART DISEASE WITHOUT HEART FAILURE: Chronic | ICD-10-CM

## 2017-11-13 DIAGNOSIS — E78.00 HYPERCHOLESTEROLEMIA: Chronic | ICD-10-CM

## 2017-11-13 DIAGNOSIS — E11.9 CONTROLLED TYPE 2 DIABETES MELLITUS WITHOUT COMPLICATION, WITHOUT LONG-TERM CURRENT USE OF INSULIN (HCC): Chronic | ICD-10-CM

## 2017-11-13 RX ORDER — ALBUTEROL SULFATE 90 UG/1
2 AEROSOL, METERED RESPIRATORY (INHALATION)
Qty: 1 INHALER | Refills: 1 | Status: SHIPPED | OUTPATIENT
Start: 2017-11-13 | End: 2018-11-07

## 2017-11-13 NOTE — PROGRESS NOTES
ROOM # 4    Moriah Galvan presents today for   Chief Complaint   Patient presents with    Annual Wellness Visit       Moriah Galvan preferred language for health care discussion is english/other. Is someone accompanying this pt? no    Is the patient using any DME equipment during OV? no    Depression Screening:  PHQ over the last two weeks 11/13/2017 11/13/2017 7/13/2017 3/11/2017 3/1/2017 7/28/2016 11/23/2015   Little interest or pleasure in doing things More than half the days Not at all Not at all Not at all Not at all Not at all Not at all   Feeling down, depressed or hopeless More than half the days Not at all Not at all Not at all Not at all Not at all Several days   Total Score PHQ 2 4 0 0 0 0 0 1       Learning Assessment:  Learning Assessment 5/19/2015   PRIMARY LEARNER Patient   HIGHEST LEVEL OF EDUCATION - PRIMARY LEARNER  > 4 YEARS Arun PRIMARY LEARNER NONE   CO-LEARNER CAREGIVER No   PRIMARY LANGUAGE ENGLISH   LEARNER PREFERENCE PRIMARY READING     DEMONSTRATION   ANSWERED BY Patient   RELATIONSHIP SELF       Abuse Screening:  Abuse Screening Questionnaire 11/13/2017 5/19/2015   Do you ever feel afraid of your partner? N N   Are you in a relationship with someone who physically or mentally threatens you? N N   Is it safe for you to go home? Y Y       Fall Risk  Fall Risk Assessment, last 12 mths 11/13/2017 7/13/2017 3/11/2017 3/1/2017 7/28/2016 11/23/2015 11/17/2014   Able to walk? Yes Yes Yes Yes Yes Yes Yes   Fall in past 12 months? No No No No No No No       Health Maintenance reviewed and discussed per provider. Yes    Moriah Galvan is due for flu( pt states she got at [employer] in Oct), Microalb, MWV. Please order/place referral if appropriate. Advance Directive:  1. Do you have an advance directive in place? Patient Reply: pt did not want to discuss    2. If not, would you like material regarding how to put one in place? Patient Reply: no    Coordination of Care:  1. Have you been to the ER, urgent care clinic since your last visit? Hospitalized since your last visit? no    2. Have you seen or consulted any other health care providers outside of the [address] [place] [address] since your last visit? Include any pap smears or colon screening.  no

## 2017-11-13 NOTE — MR AVS SNAPSHOT
Visit Information Date & Time Provider Department Dept. Phone Encounter #  
 11/13/2017  9:30 AM Char Mckeon, [address] [id number] Follow-up Instructions Return in about 3 months (around 2/13/2018) for HTN, DM. Upcoming Health Maintenance Date Due MICROALBUMIN Q1 7/28/2017 MEDICARE YEARLY EXAM 11/2/2017 EYE EXAM RETINAL OR DILATED Q1 12/1/2017 HEMOGLOBIN A1C Q6M 1/27/2018 BREAST CANCER SCRN MAMMOGRAM 3/27/2018 LIPID PANEL Q1 7/27/2018 FOOT EXAM Q1 9/18/2018 GLAUCOMA SCREENING Q2Y 12/1/2018 COLONOSCOPY 7/12/2023 DTaP/Tdap/Td series (2 - Td) 5/19/2025 Allergies as of 11/13/2017  Review Complete On: 11/13/2017 By: Char Mckeon MD  
  
 Severity Noted Reaction Type Reactions Amoxicillin  12/18/2014    Rash Ampicillin  03/04/2015    Rash Codeine  11/15/2011   Side Effect Nausea and Vomiting Tussionex  01/15/2014   Side Effect Other (comments) Hallucinations Valium [Diazepam]  11/15/2011   Systemic Vertigo Current Immunizations  Reviewed on 1/27/2014 Name Date Influenza Vaccine 9/30/2013 10:40 AM  
 Pneumococcal Conjugate (PCV-13) 11/1/2016 11:59 AM  
 Pneumococcal Polysaccharide (PPSV-23) 1/27/2014  9:20 AM  
 Tdap 5/19/2015 Not reviewed this visit You Were Diagnosed With   
  
 Codes Comments Medicare annual wellness visit, subsequent    -  Primary ICD-10-CM: Z00.00 ICD-9-CM: V70.0 Controlled type 2 diabetes mellitus without complication, without long-term current use of insulin ([facility] [address]     ICD-10-CM: E11.9 ICD-9-CM: 250.00 Hypertensive heart disease without heart failure     ICD-10-CM: I11.9 ICD-9-CM: 402.90 Hypothyroidism, unspecified type     ICD-10-CM: E03.9 ICD-9-CM: 244.9 Hypercholesterolemia     ICD-10-CM: E78.00 ICD-9-CM: 272.0 Dyspnea, unspecified type     ICD-10-CM: R06.00 
ICD-9-CM: 786.09 Vitals BP Pulse Temp Resp Height(growth percentile) Weight(growth percentile) 130/72 (BP 1 Location: Right arm, BP Patient Position: Sitting) (!) 57 96.6 °F (35.9 °C) (Oral) 20 5' 4\" (1.626 m) 186 lb (84.4 kg) SpO2 BMI OB Status Smoking Status 98% 31.93 kg/m2 Hysterectomy Never Smoker Vitals History BMI and BSA Data Body Mass Index Body Surface Area  
 31.93 kg/m 2 1.95 m 2 Preferred Pharmacy Pharmacy Name Phone [facility], [address] [phone] Your Updated Medication List  
  
   
This list is accurate as of: 11/13/17 10:20 AM.  Always use your most recent med list.  
  
  
  
  
 albuterol 90 mcg/actuation inhaler Commonly known as:  PROVENTIL HFA, VENTOLIN HFA, PROAIR HFA Take 2 Puffs by inhalation every six (6) hours as needed for Wheezing. Indications: BRONCHOSPASM PREVENTION  
  
 BABY ASPIRIN PO Take 81 mg by mouth. cyclobenzaprine 10 mg tablet Commonly known as:  FLEXERIL Take 1 Tab by mouth nightly. Indications: MUSCLE SPASM  
  
 ergocalciferol 50,000 unit capsule Commonly known as:  ERGOCALCIFEROL Take 50,000 Units by mouth every seven (7) days. loratadine 10 mg tablet Commonly known as:  Rosan Erath Take 10 mg by mouth.  
  
 metoprolol tartrate 50 mg tablet Commonly known as:  LOPRESSOR Take one tablet by mouth one time daily  
  
 montelukast 10 mg tablet Commonly known as:  SINGULAIR  
TAKE 1 TABLET BY MOUTH NIGHTLY AT BEDTIME  
  
 simvastatin 40 mg tablet Commonly known as:  ZOCOR  
TAKE ONE TABLET BY MOUTH IN THE EVENING  
  
 spironolactone 50 mg tablet Commonly known as:  ALDACTONE Take 1 Tab by mouth daily. SYNTHROID 100 mcg tablet Generic drug:  levothyroxine Take 1 Tab by mouth daily. Prescriptions Sent to Pharmacy  Refills  
 albuterol (PROVENTIL HFA, VENTOLIN HFA, PROAIR HFA) 90 mcg/actuation inhaler 1  
 Sig: Take 2 Puffs by inhalation every six (6) hours as needed for Wheezing. Indications: BRONCHOSPASM PREVENTION Class: Normal  
 [facility]acy: [facility] [address] [facility]  #: [phone] Route: Inhalation Follow-up Instructions Return in about 3 months (around 2/13/2018) for HTN, DM. To-Do List   
 11/13/2017 ECHO:  2D ECHO COMPLETE ADULT (TTE) W OR WO CONTR   
  
 11/16/2017 3:00 PM  
  Appointment with Leonidas Diaz PTA at [facility]  [facility],[address] ([phone])  
  
 11/21/2017 3:00 PM  
  Appointment with Nader Owen PT at [facility]  [facility],[address] ([phone])  
  
 11/28/2017 3:00 PM  
  Appointment with Nader Owen PT at [facility] PT [address] [facility] ([phone]) 12/05/2017 3:00 PM  
  Appointment with Leonidas Diaz PTA at [facility] PT [address] [facility] ([phone])  
  
 12/12/2017 3:00 PM  
  Appointment with Nader Owen PT at [facility] PT [address] [place] [address] ([phone]) Patient Instructions Medicare Wellness Visit, Female The best way to live healthy is to have a healthy lifestyle by eating a well-balanced diet, exercising regularly, limiting alcohol and stopping smoking. Regular physical exams and screening tests are another way to keep healthy. Preventive exams provided by your health care provider can find health problems before they become diseases or illnesses. Preventive services including immunizations, screening tests, monitoring and exams can help you take care of your own health. All people over age 72 should have a pneumovax  and and a prevnar shot to prevent pneumonia. These are once in a lifetime unless you and your provider decide differently. All people over 65 should have a yearly flu shot and a tetanus vaccine every 10 years. A bone mass density to screen for osteoporosis or thinning of the bones should be done every 2 years after 65. Screening for diabetes mellitus with a blood sugar test should be done every year. Glaucoma is a disease of the eye due to increased ocular pressure that can lead to blindness and it should be done every year by an eye professional. 
 
Cardiovascular screening tests that check for elevated lipids (fatty part of blood) which can lead to heart disease and strokes should be done every 5 years. Colorectal screening that evaluates for blood or polyps in your colon should be done yearly as a stool test or every five years as a flexible sigmoidoscope or every 10 years as a colonoscopy up to age 76. Breast cancer screening with a mammogram is recommended biennially  for women age 54-69. Screening for cervical cancer with a pap smear and pelvic exam is recommended for women after age 72 years every 2 years up to age 79 or when the provider and patient decide to stop. If there is a history of cervical abnormalities or other increased risk for cancer then the test is recommended yearly. Hepatitis C screening is also recommended for anyone born between [birth year] through [address]. A shingles vaccine is also recommended once in a lifetime after age 61. Your Medicare Wellness Exam is recommended annually. Here is a list of your current Health Maintenance items with a due date: 
Health Maintenance Due Topic Date Due  
 Albumin Urine Test  07/28/2017 [facility] Annual Well Visit  11/02/2017 [facility] Eye Exam  12/01/2017 Introducing \[facility]\"" & HEALTH SERVICES! Dear Shari Nelson: Thank you for requesting a [employer] account. Our records indicate that you already have an active [employer] account. You can access your account anytime at [email or web address] Did you know that you can access your hospital and ER discharge instructions at any time in [employer]? You can also review all of your test results from your hospital stay or ER visit. Additional Information If you have questions, please visit the Frequently Asked Questions section of the [employer] website at [email or web address] Remember, [employer] is NOT to be used for urgent needs. For medical emergencies, dial 911. Now available from your iPhone and Android! Please provide this summary of care documentation to your next provider. Your primary care clinician is listed as [facility]. If you have any questions after today's visit, please call [phone].

## 2017-11-13 NOTE — PATIENT INSTRUCTIONS

## 2017-11-13 NOTE — PROGRESS NOTES
HISTORY OF PRESENT ILLNESS  Jaqueline Rodriguez is a 67 y.o. female. Visit Vitals    /72 (BP 1 Location: Right arm, BP Patient Position: Sitting)    Pulse (!) 57    Temp 96.6 °F (35.9 °C) (Oral)    Resp 20    Ht 5' 4\" (1.626 m)    Wt 186 lb (84.4 kg)    SpO2 98%    BMI 31.93 kg/m2       HPI Comments: States she was at a diabetes walk in October. Her HBA1c was down to 6.1. The next day her glucose was 94 fasting. She brought in lab from 8/26/17--see media. Her renal fcn is stable. Her vitamin D was 37.0    Reports under stress, rushing around, cold air, she feels like she can't get her breath. Her last echo was 3 yrs ago and was OK. Her PFT were a year ago and showed very little. Hypertension    The history is provided by the patient. This is a chronic problem. The current episode started more than 1 week ago. The problem has not changed since onset. Pertinent negatives include no chest pain, no palpitations and no shortness of breath. There are no associated agents to hypertension. Risk factors include postmenopause, obesity, stress, dyslipidemia and diabetes mellitus. Diabetes   The history is provided by the patient. This is a chronic problem. The current episode started more than 1 week ago. The problem occurs daily. The problem has been gradually improving. Pertinent negatives include no chest pain and no shortness of breath. Exacerbated by: diet. Relieved by: diet. Treatments tried: see med list. The treatment provided moderate relief. Review of Systems   Constitutional: Negative. Respiratory: Negative for shortness of breath. Cardiovascular: Negative for chest pain, palpitations and leg swelling. Genitourinary: Negative for frequency. Neurological: Negative. Endo/Heme/Allergies: Negative for polydipsia. Physical Exam   Constitutional: She is oriented to person, place, and time. She appears well-developed and well-nourished. No distress.    Cardiovascular: Normal rate and regular rhythm. Pulmonary/Chest: Effort normal and breath sounds normal.   Musculoskeletal: She exhibits no edema. Neurological: She is alert and oriented to person, place, and time. Skin: Skin is warm and dry. She is not diaphoretic. Psychiatric: She has a normal mood and affect. Nursing note and vitals reviewed. ASSESSMENT and PLAN    ICD-10-CM ICD-9-CM    1. Medicare annual wellness visit, subsequent Z00.00 V70.0    2. Controlled type 2 diabetes mellitus without complication, without long-term current use of insulin (HCC) E11.9 250.00    3. Hypertensive heart disease without heart failure I11.9 402.90    4. Hypothyroidism, unspecified type E03.9 244.9    5. Hypercholesterolemia E78.00 272.0    6. Dyspnea, unspecified type R06.00 786.09 2D ECHO COMPLETE ADULT (TTE) W OR WO CONTR      albuterol (PROVENTIL HFA, VENTOLIN HFA, PROAIR HFA) 90 mcg/actuation inhaler       BP controlled    Lab due next visit    Discussed BMI/weight, lifestyle, diet and exercise  She is aware and will work on it. Will try albuterol MDI for the dyspnea.  Update echo    F/u 3 months

## 2017-11-13 NOTE — ACP (ADVANCE CARE PLANNING)
Advance Care Planning (ACP) Provider Conversation Snapshot    Date of ACP Conversation: 11/13/17  Persons included in Conversation:  patient  Length of ACP Conversation in minutes:  <16 minutes (Non-Billable)    Authorized Decision Maker (if patient is incapable of making informed decisions):    This person is:   not desgnated today          For Patients with Decision Making Capacity:   Values/Goals: Exploration of values, goals, and preferences if recovery is not expected, even with continued medical treatment in the event of:  Imminent death  Severe, permanent brain injury    Conversation Outcomes / Follow-Up Plan:   Recommended completion of Advance Directive form after review of ACP materials and conversation with prospective healthcare agent       Pt is reluctant to think about and discuss but acknowledges the importance

## 2017-11-13 NOTE — PROGRESS NOTES
This is a Subsequent Medicare Annual Wellness Exam (AWV) (Performed 12 months after IPPE or effective date of Medicare Part B enrollment)    I have reviewed the patient's medical history in detail and updated the computerized patient record. History     Past Medical History:   Diagnosis Date    CRI (chronic renal insufficiency)     stage 3    Diabetes mellitus, type 2 ([facility] [address] 2/2016    by HBA1c    Disease of inner ear     GERD (gastroesophageal reflux disease) 12/2/2011    Hypercholesterolemia     Hypertension     Hypothyroidism 12/2/2011      Past Surgical History:   Procedure Laterality Date    BIOPSY OF BREAST, INCISIONAL  1995    left    HX BREAST REDUCTION      bilateral    HX CARPAL TUNNEL RELEASE      bilateral     HX COLONOSCOPY  7/12/13    no f/u, Dr. Del Martinez      right tympanicplastty    HX HYSTERECTOMY      partial    HX ORTHOPAEDIC      right ulna    HX ORTHOPAEDIC      bilateral trigger finger releases     Current Outpatient Prescriptions   Medication Sig Dispense Refill    montelukast (SINGULAIR) 10 mg tablet TAKE 1 TABLET BY MOUTH NIGHTLY AT BEDTIME  12    loratadine (CLARITIN) 10 mg tablet Take 10 mg by mouth.  spironolactone (ALDACTONE) 50 mg tablet Take 1 Tab by mouth daily. 90 Tab 3    metoprolol tartrate (LOPRESSOR) 50 mg tablet Take one tablet by mouth one time daily 90 Tab 3    simvastatin (ZOCOR) 40 mg tablet TAKE ONE TABLET BY MOUTH IN THE EVENING 90 Tab 5    SYNTHROID 100 mcg tablet Take 1 Tab by mouth daily. 90 Tab 5    ergocalciferol (ERGOCALCIFEROL) 50,000 unit capsule Take 50,000 Units by mouth every seven (7) days.  BABY ASPIRIN PO Take 81 mg by mouth.  cyclobenzaprine (FLEXERIL) 10 mg tablet Take 1 Tab by mouth nightly. Indications:  MUSCLE SPASM 7 Tab 0     Allergies   Allergen Reactions    Amoxicillin Rash    Ampicillin Rash    Codeine Nausea and Vomiting    Tussionex Other (comments)     Hallucinations    Valium [Diazepam] Vertigo     Family History   Problem Relation Age of Onset    Heart Disease Mother     Diabetes Mother      dialysis    Alcohol abuse Father     Heart defect Father     Alcohol abuse Sister     Heart defect Sister     Diabetes Maternal Aunt     Diabetes Maternal Uncle     Diabetes Maternal Grandmother     Stroke Maternal Grandmother     Diabetes Maternal Aunt     Diabetes Maternal Uncle     Diabetes Daughter     Cancer Daughter     Hypertension Daughter     Migraines Son      Social History   Substance Use Topics    Smoking status: Never Smoker    Smokeless tobacco: Never Used    Alcohol use No     Patient Active Problem List   Diagnosis Code    Cervical strain, acute S16. 1XXA    Post concussion syndrome F07.81    HCVD (hypertensive cardiovascular disease) I11.9    GERD (gastroesophageal reflux disease) K21.9    Hypothyroidism E03.9    Hypercholesterolemia E78.00    Controlled type 2 diabetes mellitus without complication, without long-term current use of insulin (HCC) E11.9       Depression Risk Factor Screening:     PHQ over the last two weeks 11/13/2017   Little interest or pleasure in doing things More than half the days   Feeling down, depressed or hopeless More than half the days   Total Score PHQ 2 4     Alcohol Risk Factor Screening: You do not drink alcohol or very rarely. Functional Ability and Level of Safety:   Hearing Loss  Hearing is good. Pt says she has been tested and told everything was OK. But she still has some concerns    Activities of Daily Living  The home contains: no safety equipment. She says she has lots of little bags around that are potential hazards. Patient does total self care    Fall Risk  Fall Risk Assessment, last 12 mths 11/13/2017   Able to walk? Yes   Fall in past 12 months?  No       Abuse Screen  Patient is not abused    Cognitive Screening   Evaluation of Cognitive Function:  Has your family/caregiver stated any concerns about your memory: no  Normal, Mini Cog test    Patient Care Team   Patient Care Team:  Trino Mobley MD as PCP - General (Internal Medicine)  Benji Schultz DPM (Podiatry)  Catalina Anglin MD (Otolaryngology)  Ora Arechiga MD as Consulting Provider (Ophthalmology)  Damian Cespedes MD as Consulting Provider (Nephrology)    Assessment/Plan   Education and counseling provided:  Are appropriate based on today's review and evaluation    Diagnoses and all orders for this visit:    1. Medicare annual wellness visit, subsequent    2. Controlled type 2 diabetes mellitus without complication, without long-term current use of insulin ([facility] [address]    3. Hypertensive heart disease without heart failure    4. Hypothyroidism, unspecified type    5.  Hypercholesterolemia        Health Maintenance Due   Topic Date Due    MICROALBUMIN Q1  07/28/2017    MEDICARE YEARLY EXAM  11/02/2017    EYE EXAM RETINAL OR DILATED Q1  12/01/2017

## 2017-11-16 ENCOUNTER — HOSPITAL ENCOUNTER (OUTPATIENT)
Dept: PHYSICAL THERAPY | Age: 72
Discharge: HOME | End: 2017-11-16
Payer: MEDICARE

## 2017-11-16 PROCEDURE — 97110 THERAPEUTIC EXERCISES: CPT

## 2017-11-16 NOTE — PROGRESS NOTES
PHYSICAL THERAPY - DAILY TREATMENT NOTE    Patient Name: Bridger Knight        Date: 2017  : 1945   YES Patient  Verified  Visit #:   4   of   8  Insurance: Payor: Lee Dear / Plan: [place] & B / Product Type: Medicare /      In time: 2:43 pm Out time: 3:03 pm   Total Treatment Time: 20     Medicare Time Tracking (below)   Total Timed Codes (min):  20 1:1 Treatment Time:20     TREATMENT AREA =  Dizziness [R42]    SUBJECTIVE  Pain Level (on 0 to 10 scale):  4  / 10   Medication Changes/New allergies or changes in medical history, any new surgeries or procedures? NO    If yes, update Summary List   Subjective Functional Status/Changes:  []  No changes reported     Pt reports Saturday \"I went to a craft show. I hurt afterwards. I guess I notice it more since I've been in therapy. I almost fell while carrying the turkey in. \" pt reports no falls . A few near falls. OBJECTIVE  Modalities Rationale:   Na       20 min Therapeutic Exercise:  [x]  See flow sheet   Rationale:      increase ROM, increase strength, improve coordination, improve balance and increase proprioception to improve the patients ability to decrease fall risk           min Patient Education:  YES  Reviewed HEP   []  Progressed/Changed HEP based on: Other Objective/Functional Measures:    Increase head turns to 5 reps -no sx reported, RTB for hip ABD     Post Treatment Pain Level (on 0 to 10) scale:   4  / 10     ASSESSMENT  Assessment/Changes in Function:   Pt demonstrates inconsistencies in static standing balance stability. Static standing balance in front of corner ~ <3-5 seconds. Pt able to stand and bend to  purse and put on shoulder without LOB. Pt demonstrating improving use of weight shift forward and up with push through LE for sit to stand body mechanics with VCs and tactile cues. Pt demonstrating improving sit to stand technique with last 2 sit to stands.  Mild to moderate fatigue with sitting exercises for LE strengthening. Emphasized short walks throughout day for endurance improvement. Increased [place] head turns to 5 reps with no reports of sx.   []  See Progress Note/Recertification   Patient will continue to benefit from skilled PT services to modify and progress therapeutic interventions, address functional mobility deficits, address ROM deficits, address strength deficits, address imbalance/dizziness and instruct in home and community integration to attain remaining goals. Progress toward goals / Updated goals:  ) Patient performing daily HEP and educated in fall prevention techniques. met goal 11-16-17  2) Patient will be able to maintain stand unsupported for 30 seconds eyes open to increase safety with ADLs.     3) Patient able to perform 5 repititions of cervical rotation, flexion and extension with no > mild symptoms.  progressing  4) Patient to score 45 on DHI indicating improved function.   5) Increase bilateral hip flexion/abduction to 3+/5 to facilitate standing       PLAN  []  Upgrade activities as tolerated YES Continue plan of care   []  Discharge due to :    []  Other:      Therapist: Jaylin Cruz PTA    Date: 11/16/2017 Time: 3:03 PM     Future Appointments  Date Time Provider Arielle Guerrero   11/16/2017 3:00 PM Jaylin Cruz PTA [facility]   11/21/2017 3:00 PM Hereford Cons, PT [facility]   11/28/2017 3:00 PM Lexy Cons, PT [facility]   12/5/2017 3:00 PM Jaylin Cruz PTA [facility]   12/12/2017 3:00 PM Hereford Cons, PT [facility]   2/20/2018 9:30 AM Karla Noel MD [id number] [facility]

## 2017-11-21 ENCOUNTER — HOSPITAL ENCOUNTER (OUTPATIENT)
Dept: PHYSICAL THERAPY | Age: 72
Discharge: HOME | End: 2017-11-21
Payer: MEDICARE

## 2017-11-21 PROCEDURE — 97112 NEUROMUSCULAR REEDUCATION: CPT

## 2017-11-21 PROCEDURE — 97110 THERAPEUTIC EXERCISES: CPT

## 2017-11-21 NOTE — PROGRESS NOTES
PHYSICAL THERAPY - DAILY TREATMENT NOTE    Patient Name: Pa Purcell        Date: 2017  : 1945   YES Patient  Verified  Visit #:   5   of   8  Insurance: Payor: Leandra Celis / Plan: [place] MEDICARE PART A & B / Product Type: Medicare /      In time: 3:10 Out time: 3:51   Total Treatment Time: 41     Medicare Time Tracking (below)   Total Timed Codes (min):  41 1:1 Treatment Time:  41     TREATMENT AREA =  Dizziness [R42]    SUBJECTIVE  Pain Level (on 0 to 10 scale):  6  / 10   Medication Changes/New allergies or changes in medical history, any new surgeries or procedures? NO    If yes, update Summary List   Subjective Functional Status/Changes:  []  No changes reported     Having back pain. Doesn't feel the exercises are contributing to her back pain. OBJECTIVE  41 min Therapeutic Exercise:  [x]  See flow sheet   Rationale:      increase ROM, increase strength, improve coordination, improve balance and increase proprioception to improve the patients ability to decrease fall risk              min Patient Education:  YES  Reviewed HEP   [x]  Progressed/Changed HEP based on: Add eye movements, sit to stand     Other Objective/Functional Measures:    DHI 54,  Hip flexion 3+/5., stand wide LOREN EO 60 seconds. Cervical rotation 5x slowly no symptoms, cervical flexion/extension mild symptoms. Post Treatment Pain Level (on 0 to 10) scale:   6  / 10     ASSESSMENT  Assessment/Changes in Function:     Improved use of sensory input and improved bilateral hip strength. Slowly improving ability to have vestibular stimulation.         [x]  See Progress Note/Recertification   Patient will continue to benefit from skilled PT services to modify and progress therapeutic interventions, address functional mobility deficits, address ROM deficits, address strength deficits, address imbalance/dizziness and instruct in home and community integration to attain remaining goals   Progress toward goals / Updated goals:    See PN     PLAN  [x]  Upgrade activities as tolerated YES Continue plan of care   []  Discharge due to :    []  Other:      Therapist: Penny Alvarez PT    Date: 11/21/2017 Time: 3:12 PM     Future Appointments  Date Time Provider Arielle Guerrero   11/28/2017 3:00 PM Penny Alvarez PT [facility]   12/5/2017 3:00 PM Scott Jones PTA [facility]   12/12/2017 3:00 PM Penny Alvarez PT [facility]   2/20/2018 9:30 AM Hudson Gordillo MD [address] [facility]

## 2017-11-21 NOTE — PROGRESS NOTES
[facility] AT [facility] [address], [address] - Phone: [phone]  Fax: [phone]  PROGRESS NOTE  Patient Name: Francheska Low :    Treatment/Medical Diagnosis: Dizziness [R42]   Referral Source: Ángel Mcleod MD     Date of Initial Visit: 10/23/2017 Attended Visits: 4 Missed Visits: 0     SUMMARY OF TREATMENT  PT has consisted of strengthening, balance, vestibular stimulation and gait exercises with home exercise program and education for fall prevention. CURRENT STATUS  Patient has made slow progress in PT with short term goals partially met. Functional improvement includes patient being able to stand unsupported for 60 seconds. Goal/Measure of Progress Goal Met? 1. Patient performing daily HEP and educated in fall prevention techniques. Status at last Eval: na Current Status: 5 days this past week. progressing   2. Pt to be able to maintain stand unsupported for 30 seconds eyes open to increase safety with ADLs. .   Status at last Eval: unable Current Status: 60 seconds yes   3. Patient able to perform 5 repititions of cervical rotation, flexion and extension with no > mild symptoms   Status at last Eval: na Current Status: Able to perform 5 reps with no > mild symptoms yes   4. Patient to score 45 on DHI indicating improved function  5. Increase bilateral hip flexion/abduction to 3+/5 to facilitate standing. Status at last Eval: 50  3/5 Current Status: 54  3+/5 No  yes     New Goals to be achieved in __4__  weeks:  1) Patient to improve score on FOTO to 70 indicating improved quality of life. 2) Patient to improve score on DGI to 9/24 indicating decreased fall risk with ambulation. 3) Patient to be independent  with HEP. 4) Patient to score 45 on DHI indicating improved function    G-Codes: na  RECOMMENDATIONS  Continue PT 1x per week for 4 weeks.   If you have any questions/comments please contact us directly at [phone]. Thank you for allowing us to assist in the care of your patient. Therapist Signature: Sukhdeep Clayton PT Date: 11/21/2017     Time: 2:03 PM   NOTE TO PHYSICIAN:  PLEASE COMPLETE THE ORDERS BELOW AND FAX TO   [facility]: [phone]. If you are unable to process this request in 24 hours please contact our office: [phone].    ___ I have read the above report and request that my patient continue as recommended.   ___ I have read the above report and request that my patient continue therapy with the following changes/special instructions:_________________________________________________________   ___ I have read the above report and request that my patient be discharged from therapy.      Physician Signature:        Date:       Time:

## 2017-11-28 ENCOUNTER — HOSPITAL ENCOUNTER (OUTPATIENT)
Dept: PHYSICAL THERAPY | Age: 72
Discharge: HOME | End: 2017-11-28
Payer: MEDICARE

## 2017-11-28 PROCEDURE — 97110 THERAPEUTIC EXERCISES: CPT

## 2017-12-05 ENCOUNTER — APPOINTMENT (OUTPATIENT)
Dept: PHYSICAL THERAPY | Age: 72
End: 2017-12-05
Payer: MEDICARE

## 2017-12-05 ENCOUNTER — OFFICE VISIT (OUTPATIENT)
Dept: INTERNAL MEDICINE CLINIC | Age: 72
End: 2017-12-05

## 2017-12-05 VITALS
OXYGEN SATURATION: 98 % | DIASTOLIC BLOOD PRESSURE: 74 MMHG | WEIGHT: 186.4 LBS | RESPIRATION RATE: 16 BRPM | TEMPERATURE: 96.5 F | BODY MASS INDEX: 31.82 KG/M2 | SYSTOLIC BLOOD PRESSURE: 129 MMHG | HEIGHT: 64 IN | HEART RATE: 71 BPM

## 2017-12-05 DIAGNOSIS — S13.4XXA WHIPLASH INJURY TO NECK, INITIAL ENCOUNTER: Primary | ICD-10-CM

## 2017-12-05 RX ORDER — TIZANIDINE 2 MG/1
2 TABLET ORAL
Qty: 90 TAB | Refills: 0 | Status: SHIPPED | OUTPATIENT
Start: 2017-12-05 | End: 2018-11-07

## 2017-12-05 RX ORDER — HYDROCORTISONE 25 MG/G
CREAM TOPICAL
Refills: 0 | COMMUNITY
Start: 2017-09-11 | End: 2018-11-07

## 2017-12-05 RX ORDER — DICLOFENAC SODIUM 10 MG/G
GEL TOPICAL
Refills: 3 | COMMUNITY
Start: 2017-09-13
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
## 2017-12-12 ENCOUNTER — HOSPITAL ENCOUNTER (OUTPATIENT)
Dept: PHYSICAL THERAPY | Age: 72
Discharge: HOME | End: 2017-12-12
Payer: MEDICARE

## 2017-12-12 PROCEDURE — 97110 THERAPEUTIC EXERCISES: CPT
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
## 2017-12-27 ENCOUNTER — OFFICE VISIT (OUTPATIENT)
Dept: INTERNAL MEDICINE CLINIC | Age: 72
End: 2017-12-27

## 2017-12-27 VITALS
HEIGHT: 64 IN | OXYGEN SATURATION: 96 % | BODY MASS INDEX: 31.76 KG/M2 | WEIGHT: 186 LBS | HEART RATE: 79 BPM | SYSTOLIC BLOOD PRESSURE: 139 MMHG | DIASTOLIC BLOOD PRESSURE: 85 MMHG | TEMPERATURE: 99.1 F | RESPIRATION RATE: 18 BRPM

## 2017-12-27 DIAGNOSIS — J32.9 SINUSITIS, UNSPECIFIED CHRONICITY, UNSPECIFIED LOCATION: Primary | ICD-10-CM

## 2017-12-27 DIAGNOSIS — Z76.0 MEDICATION REFILL: ICD-10-CM

## 2017-12-27 PROBLEM — E11.21 TYPE 2 DIABETES MELLITUS WITH NEPHROPATHY (HCC): Status: ACTIVE | Noted: 2017-12-27

## 2017-12-27 RX ORDER — GUAIFENESIN 600 MG/1
600 TABLET, EXTENDED RELEASE ORAL 2 TIMES DAILY
Qty: 16 TAB | Refills: 1 | Status: SHIPPED | OUTPATIENT
Start: 2017-12-27 | End: 2018-11-07

## 2017-12-27 RX ORDER — LEVOTHYROXINE SODIUM 100 UG/1
100 TABLET ORAL DAILY
Qty: 90 TAB | Refills: 5 | Status: SHIPPED | OUTPATIENT
Start: 2017-12-27 | End: 2018-06-19 | Stop reason: SDUPTHER

## 2017-12-27 RX ORDER — BENZONATATE 200 MG/1
200 CAPSULE ORAL
Qty: 21 CAP | Refills: 1 | Status: SHIPPED | OUTPATIENT
Start: 2017-12-27 | End: 2018-01-03

## 2017-12-27 NOTE — PROGRESS NOTES
ROOM # 5    Kb Salazar presents today for   Chief Complaint   Patient presents with    Cold Symptoms     sore throat, cough, headache x 3 days       Kb Salazar preferred language for health care discussion is english/other. Is someone accompanying this pt? no    Is the patient using any DME equipment during OV? no    Depression Screening:  PHQ over the last two weeks 12/27/2017 12/5/2017 11/13/2017 11/13/2017 7/13/2017 3/11/2017 3/1/2017   Little interest or pleasure in doing things Not at all Not at all More than half the days Not at all Not at all Not at all Not at all   Feeling down, depressed or hopeless Not at all Not at all More than half the days Not at all Not at all Not at all Not at all   Total Score PHQ 2 0 0 4 0 0 0 0       Learning Assessment:  Learning Assessment 5/19/2015   PRIMARY LEARNER Patient   HIGHEST LEVEL OF EDUCATION - PRIMARY LEARNER  > 4 YEARS [address] CAREGIVER No   PRIMARY LANGUAGE ENGLISH   LEARNER PREFERENCE PRIMARY READING     DEMONSTRATION   ANSWERED BY Patient   RELATIONSHIP SELF       Abuse Screening:  Abuse Screening Questionnaire 11/13/2017 5/19/2015   Do you ever feel afraid of your partner? N N   Are you in a relationship with someone who physically or mentally threatens you? N N   Is it safe for you to go home? Y Y       Fall Risk  Fall Risk Assessment, last 12 mths 12/27/2017 12/5/2017 11/13/2017 7/13/2017 3/11/2017 3/1/2017 7/28/2016   Able to walk? Yes Yes Yes Yes Yes Yes Yes   Fall in past 12 months? No No No No No No No       Health Maintenance reviewed and discussed per provider. Yes    Kb Salazar is due for microalbumin, eye. Please order/place referral if appropriate. Advance Directive:  1. Do you have an advance directive in place? Patient Reply: no    2. If not, would you like material regarding how to put one in place? Patient Reply: no    Coordination of Care:  1.  Have you been to the ER, urgent care clinic since your last visit? Hospitalized since your last visit? no    2. Have you seen or consulted any other health care providers outside of the [address] [place] [address] since your last visit? Include any pap smears or colon screening.  no

## 2017-12-27 NOTE — PATIENT INSTRUCTIONS
Sinusitis: Care Instructions  Your Care Instructions    Sinusitis is an infection of the lining of the sinus cavities in your head. Sinusitis often follows a cold. It causes pain and pressure in your head and face. In most cases, sinusitis gets better on its own in 1 to 2 weeks. But some mild symptoms may last for several weeks. Sometimes antibiotics are needed. Follow-up care is a key part of your treatment and safety. Be sure to make and go to all appointments, and call your doctor if you are having problems. It's also a good idea to know your test results and keep a list of the medicines you take. How can you care for yourself at home? · Take an over-the-counter pain medicine, such as acetaminophen (Tylenol), ibuprofen (Advil, Motrin), or naproxen (Aleve). Read and follow all instructions on the label. · If the doctor prescribed antibiotics, take them as directed. Do not stop taking them just because you feel better. You need to take the full course of antibiotics. · Be careful when taking over-the-counter cold or flu medicines and Tylenol at the same time. Many of these medicines have acetaminophen, which is Tylenol. Read the labels to make sure that you are not taking more than the recommended dose. Too much acetaminophen (Tylenol) can be harmful. · Breathe warm, moist air from a steamy shower, a hot bath, or a sink filled with hot water. Avoid cold, dry air. Using a humidifier in your home may help. Follow the directions for cleaning the machine. · Use saline (saltwater) nasal washes to help keep your nasal passages open and wash out mucus and bacteria. You can buy saline nose drops at a grocery store or drugstore. Or you can make your own at home by adding 1 teaspoon of salt and 1 teaspoon of baking soda to 2 cups of distilled water. If you make your own, fill a bulb syringe with the solution, insert the tip into your nostril, and squeeze gently. Keith Erick your nose.   · Put a hot, wet towel or a warm gel pack on your face 3 or 4 times a day for 5 to 10 minutes each time. · Try a decongestant nasal spray like oxymetazoline (Afrin). Do not use it for more than 3 days in a row. Using it for more than 3 days can make your congestion worse. When should you call for help? Call your doctor now or seek immediate medical care if:  ? · You have new or worse swelling or redness in your face or around your eyes. ? · You have a new or higher fever. ? Watch closely for changes in your health, and be sure to contact your doctor if:  ? · You have new or worse facial pain. ? · The mucus from your nose becomes thicker (like pus) or has new blood in it. ? · You are not getting better as expected. Where can you learn more? Go to http://mane-isabella.info/. Enter C979 in the search box to learn more about \"Sinusitis: Care Instructions. \"  Current as of: May 12, 2017  Content Version: 11.4  © 0236-7523 [employer]. Care instructions adapted under license by [employer] (which disclaims liability or warranty for this information). If you have questions about a medical condition or this instruction, always ask your healthcare professional. [place] [address] any warranty or liability for your use of this information. Saline Nasal Washes: Care Instructions  Your Care Instructions  Saline nasal washes help keep the nasal passages open by washing out thick or dried mucus. This simple remedy can help relieve symptoms of allergies, sinusitis, and colds. It also can make the nose feel more comfortable by keeping the mucous membranes moist. You may notice a little burning sensation in your nose the first few times you use the solution, but this usually gets better in a few days. Follow-up care is a key part of your treatment and safety. Be sure to make and go to all appointments, and call your doctor if you are having problems.  It's also a good idea to know your test results and keep a list of the medicines you take. How can you care for yourself at home? · You can buy premixed saline solution in a squeeze bottle or other sinus rinse products at a drugstore. Read and follow the instructions on the label. · You also can make your own saline solution by adding 1 teaspoon of salt and 1 teaspoon of baking soda to 2 cups of distilled water. · If you use a homemade solution, pour a small amount into a clean bowl. Using a rubber bulb syringe, squeeze the syringe and place the tip in the salt water. Pull a small amount of the salt water into the syringe by relaxing your hand. · Sit down with your head tilted slightly back. Do not lie down. Put the tip of the bulb syringe or the squeeze bottle a little way into one of your nostrils. Gently drip or squirt a few drops into the nostril. Repeat with the other nostril. Some sneezing and gagging are normal at first.  · Gently blow your nose. · Wipe the syringe or bottle tip clean after each use. · Repeat this 2 or 3 times a day. · Use nasal washes gently if you have nosebleeds often. When should you call for help? Watch closely for changes in your health, and be sure to contact your doctor if:  ? · You often get nosebleeds. ? · You have problems doing the nasal washes. Where can you learn more? Go to http://mane-isabella.info/. Enter [id number] [phone] in the search box to learn more about \"Saline Nasal Washes: Care Instructions. \"  Current as of: May 12, 2017  Content Version: 11.4  © 9824-3277 [employer]. Care instructions adapted under license by [employer] (which disclaims liability or warranty for this information). If you have questions about a medical condition or this instruction, always ask your healthcare professional. [address] any warranty or liability for your use of this information.

## 2017-12-27 NOTE — PROGRESS NOTES
HISTORY OF PRESENT ILLNESS  Yogi Mccormack is a 67 y.o. female. Visit Vitals    /85 (BP 1 Location: Left arm, BP Patient Position: Sitting)    Pulse 79    Temp 99.1 °F (37.3 °C) (Oral)    Resp 18    Ht 5' 4\" (1.626 m)    Wt 186 lb (84.4 kg)    SpO2 96%    BMI 31.93 kg/m2       Cold Symptoms   The history is provided by the patient. This is a new problem. The current episode started more than 2 days ago. The problem occurs constantly. The problem has not changed since onset. The cough is productive of sputum. Patient reports a subjective fever - was not measured. Associated symptoms include rhinorrhea and sore throat. Pertinent negatives include no chills, no sweats, no ear congestion and no ear pain. She has tried nothing for the symptoms. She is not a smoker. Review of Systems   Constitutional: Positive for malaise/fatigue. Negative for chills and fever. HENT: Positive for congestion, rhinorrhea and sore throat. Negative for ear pain. Respiratory: Positive for cough and sputum production. Physical Exam   Constitutional: She is oriented to person, place, and time. She appears well-developed and well-nourished. No distress. HENT:   Nose: Mucosal edema and rhinorrhea present. Right sinus exhibits maxillary sinus tenderness and frontal sinus tenderness. Left sinus exhibits maxillary sinus tenderness and frontal sinus tenderness. Post nasal drainage with some red streaking noted   Cardiovascular: Normal rate and regular rhythm. Pulmonary/Chest: Effort normal and breath sounds normal.   Musculoskeletal: She exhibits no edema. Neurological: She is alert and oriented to person, place, and time. Skin: Skin is warm and dry. She is not diaphoretic. Psychiatric: She has a normal mood and affect. Nursing note and vitals reviewed. ASSESSMENT and PLAN    ICD-10-CM ICD-9-CM    1.  Sinusitis, unspecified chronicity, unspecified location J32.9 473.9 benzonatate (TESSALON) 200 mg capsule      guaiFENesin ER (MUCINEX) 600 mg ER tablet   2. Medication refill Z76.0 V68.1 SYNTHROID 100 mcg tablet       She is coughing severely. But has not fever or body aches so unlikely flu or pneumonia  She has a zpak at home--advised her to take it (she got from the Urgent Care)  Will add tessalon and mucinex    F/u prn or as appointed.

## 2017-12-27 NOTE — MR AVS SNAPSHOT
Visit Information Date & Time Provider Department Dept. Phone Encounter #  
 12/27/2017  3:15 PM Black De Jesus, [facility] [id number] Follow-up Instructions Return if symptoms worsen or fail to improve, for or as appointed. Your Appointments 2/20/2018  9:30 AM  
Office Visit with Black De Jesus MD  
[facility]) Appt Note: 3 month f/u HTN/DM  
 [address]  
  
   
 [address] [facility] Upcoming Health Maintenance Date Due MICROALBUMIN Q1 7/28/2017 EYE EXAM RETINAL OR DILATED Q1 12/1/2017 HEMOGLOBIN A1C Q6M 1/27/2018 BREAST CANCER SCRN MAMMOGRAM 3/27/2018 LIPID PANEL Q1 7/27/2018 FOOT EXAM Q1 9/18/2018 MEDICARE YEARLY EXAM 11/14/2018 GLAUCOMA SCREENING Q2Y 12/1/2018 COLONOSCOPY 7/12/2023 DTaP/Tdap/Td series (2 - Td) 5/19/2025 Allergies as of 12/27/2017  Review Complete On: 12/27/2017 By: Vera Ramsey LPN Severity Noted Reaction Type Reactions Amoxicillin  12/18/2014    Rash Ampicillin  03/04/2015    Rash Codeine  11/15/2011   Side Effect Nausea and Vomiting Tussionex  01/15/2014   Side Effect Other (comments) Hallucinations Valium [Diazepam]  11/15/2011   Systemic Vertigo Current Immunizations  Reviewed on 1/27/2014 Name Date Influenza Vaccine 9/30/2013 10:40 AM  
 Pneumococcal Conjugate (PCV-13) 11/1/2016 11:59 AM  
 Pneumococcal Polysaccharide (PPSV-23) 1/27/2014  9:20 AM  
 Tdap 5/19/2015 Not reviewed this visit You Were Diagnosed With   
  
 Codes Comments Sinusitis, unspecified chronicity, unspecified location    -  Primary ICD-10-CM: J32.9 ICD-9-CM: 473.9 Medication refill     ICD-10-CM: Z76.0 ICD-9-CM: V68.1 Vitals BP Pulse Temp Resp Height(growth percentile) Weight(growth percentile) 139/85 (BP 1 Location: Left arm, BP Patient Position: Sitting) 79 99.1 °F (37.3 °C) (Oral) 18 5' 4\" (1.626 m) 186 lb (84.4 kg) SpO2 BMI OB Status Smoking Status 96% 31.93 kg/m2 Hysterectomy Never Smoker Vitals History BMI and BSA Data Body Mass Index Body Surface Area  
 31.93 kg/m 2 1.95 m 2 Preferred Pharmacy Pharmacy Name Phone [facility], [address] [phone] Your Updated Medication List  
  
   
This list is accurate as of: 12/27/17  3:50 PM.  Always use your most recent med list.  
  
  
  
  
 albuterol 90 mcg/actuation inhaler Commonly known as:  PROVENTIL HFA, VENTOLIN HFA, PROAIR HFA Take 2 Puffs by inhalation every six (6) hours as needed for Wheezing. Indications: BRONCHOSPASM PREVENTION  
  
 BABY ASPIRIN PO Take 81 mg by mouth.  
  
 benzonatate 200 mg capsule Commonly known as:  TESSALON Take 1 Cap by mouth three (3) times daily as needed for Cough for up to 7 days. diclofenac 1 % Gel Commonly known as:  VOLTAREN  
APPLY QUARTER SIZED AMOUNT TO AFFECTED AREA(S) 3 TIMES DAILY  
  
 ergocalciferol 50,000 unit capsule Commonly known as:  ERGOCALCIFEROL Take 50,000 Units by mouth every seven (7) days. guaiFENesin  mg ER tablet Commonly known as:  Rey & Rey Take 1 Tab by mouth two (2) times a day. Indications: COLD SYMPTOMS, Cough  
  
 hydrocortisone 2.5 % topical cream  
Commonly known as:  HYTONE  
APPLY TO THE AFFECTED AREAS 2 TO 3 TIMES DAILY  
  
 loratadine 10 mg tablet Commonly known as:  Angela Pace Take 10 mg by mouth.  
  
 metoprolol tartrate 50 mg tablet Commonly known as:  LOPRESSOR Take one tablet by mouth one time daily  
  
 montelukast 10 mg tablet Commonly known as:  SINGULAIR  
TAKE 1 TABLET BY MOUTH NIGHTLY AT BEDTIME  
  
 simvastatin 40 mg tablet Commonly known as:  ZOCOR  
TAKE ONE TABLET BY MOUTH IN THE EVENING  
  
 spironolactone 50 mg tablet Commonly known as:  ALDACTONE Take 1 Tab by mouth daily. SYNTHROID 100 mcg tablet Generic drug:  levothyroxine Take 1 Tab by mouth daily. Indications: hypothyroidism  
  
 tiZANidine 2 mg tablet Commonly known as:  Kelsi Flattery Take 1 Tab by mouth every six (6) hours as needed. Prescriptions Sent to Pharmacy Refills SYNTHROID 100 mcg tablet 5 Sig: Take 1 Tab by mouth daily. Indications: hypothyroidism Class: Normal  
 Pharmacy: [address] #: [phone] Route: Oral  
 benzonatate (TESSALON) 200 mg capsule 1 Sig: Take 1 Cap by mouth three (3) times daily as needed for Cough for up to 7 days. Class: Normal  
 Pharmacy: [address] #: [phone] Route: Oral  
 guaiFENesin ER (MUCINEX) 600 mg ER tablet 1 Sig: Take 1 Tab by mouth two (2) times a day. Indications: COLD SYMPTOMS, Cough Class: Normal  
 Pharmacy: [address] #: [phone] Route: Oral  
  
Follow-up Instructions Return if symptoms worsen or fail to improve, for or as appointed. To-Do List   
 01/05/2018 10:00 AM  
  Appointment with Antonina Downing PT at [facility] [address] [facility] ([phone]) Patient Instructions Sinusitis: Care Instructions Your Care Instructions Sinusitis is an infection of the lining of the sinus cavities in your head. Sinusitis often follows a cold. It causes pain and pressure in your head and face. In most cases, sinusitis gets better on its own in 1 to 2 weeks. But some mild symptoms may last for several weeks. Sometimes antibiotics are needed. Follow-up care is a key part of your treatment and safety. Be sure to make and go to all appointments, and call your doctor if you are having problems. It's also a good idea to know your test results and keep a list of the medicines you take. How can you care for yourself at home? · Take an over-the-counter pain medicine, such as acetaminophen (Tylenol), ibuprofen (Advil, Motrin), or naproxen (Aleve). Read and follow all instructions on the label. · If the doctor prescribed antibiotics, take them as directed. Do not stop taking them just because you feel better. You need to take the full course of antibiotics. · Be careful when taking over-the-counter cold or flu medicines and Tylenol at the same time. Many of these medicines have acetaminophen, which is Tylenol. Read the labels to make sure that you are not taking more than the recommended dose. Too much acetaminophen (Tylenol) can be harmful. · Breathe warm, moist air from a steamy shower, a hot bath, or a sink filled with hot water. Avoid cold, dry air. Using a humidifier in your home may help. Follow the directions for cleaning the machine. · Use saline (saltwater) nasal washes to help keep your nasal passages open and wash out mucus and bacteria. You can buy saline nose drops at a grocery store or drugstore. Or you can make your own at home by adding 1 teaspoon of salt and 1 teaspoon of baking soda to 2 cups of distilled water. If you make your own, fill a bulb syringe with the solution, insert the tip into your nostril, and squeeze gently. Alease Ruffini your nose. · Put a hot, wet towel or a warm gel pack on your face 3 or 4 times a day for 5 to 10 minutes each time. · Try a decongestant nasal spray like oxymetazoline (Afrin). Do not use it for more than 3 days in a row. Using it for more than 3 days can make your congestion worse. When should you call for help? Call your doctor now or seek immediate medical care if: 
? · You have new or worse swelling or redness in your face or around your eyes. ? · You have a new or higher fever. ? Watch closely for changes in your health, and be sure to contact your doctor if: 
? · You have new or worse facial pain. ? · The mucus from your nose becomes thicker (like pus) or has new blood in it. ? · You are not getting better as expected. Where can you learn more? Go to http://mane-isabella.info/. Enter Y554 in the search box to learn more about \"Sinusitis: Care Instructions. \" Current as of: May 12, 2017 Content Version: 11.4 © 6722-8544 NexGen Energy. Care instructions adapted under license by theBench (which disclaims liability or warranty for this information). If you have questions about a medical condition or this instruction, always ask your healthcare professional. [place] [address] any warranty or liability for your use of this information. Saline Nasal Washes: Care Instructions Your Care Instructions Saline nasal washes help keep the nasal passages open by washing out thick or dried mucus. This simple remedy can help relieve symptoms of allergies, sinusitis, and colds. It also can make the nose feel more comfortable by keeping the mucous membranes moist. You may notice a little burning sensation in your nose the first few times you use the solution, but this usually gets better in a few days. Follow-up care is a key part of your treatment and safety. Be sure to make and go to all appointments, and call your doctor if you are having problems. It's also a good idea to know your test results and keep a list of the medicines you take. How can you care for yourself at home? · You can buy premixed saline solution in a squeeze bottle or other sinus rinse products at a drugstore. Read and follow the instructions on the label. · You also can make your own saline solution by adding 1 teaspoon of salt and 1 teaspoon of baking soda to 2 cups of distilled water. · If you use a homemade solution, pour a small amount into a clean bowl.  Using a rubber bulb syringe, squeeze the syringe and place the tip in the salt water. Pull a small amount of the salt water into the syringe by relaxing your hand. · Sit down with your head tilted slightly back. Do not lie down. Put the tip of the bulb syringe or the squeeze bottle a little way into one of your nostrils. Gently drip or squirt a few drops into the nostril. Repeat with the other nostril. Some sneezing and gagging are normal at first. 
· Gently blow your nose. · Wipe the syringe or bottle tip clean after each use. · Repeat this 2 or 3 times a day. · Use nasal washes gently if you have nosebleeds often. When should you call for help? Watch closely for changes in your health, and be sure to contact your doctor if: 
? · You often get nosebleeds. ? · You have problems doing the nasal washes. Where can you learn more? Go to http://mane-isabella.info/. Enter [id number] in the search box to learn more about \"Saline Nasal Washes: Care Instructions. \" Current as of: May 12, 2017 Content Version: 11.4 © 0633-5782 CogniFit. Care instructions adapted under license by Cloud Cruiser (which disclaims liability or warranty for this information). If you have questions about a medical condition or this instruction, always ask your healthcare professional. [address] any warranty or liability for your use of this information. Introducing hospitals & HEALTH SERVICES! Dear Jose David Bojorquez: Thank you for requesting a Predikt account. Our records indicate that you already have an active Predikt account. You can access your account anytime at [email or web address] Did you know that you can access your hospital and ER discharge instructions at any time in Predikt? You can also review all of your test results from your hospital stay or ER visit. Additional Information If you have questions, please visit the Frequently Asked Questions section of the Predikt website at [email or web address] Remember, MyChart is NOT to be used for urgent needs. For medical emergencies, dial 911. Now available from your iPhone and Android! Please provide this summary of care documentation to your next provider. Your primary care clinician is listed as [facility]. If you have any questions after today's visit, please call [phone].

## 2018-01-15 ENCOUNTER — OFFICE VISIT (OUTPATIENT)
Dept: INTERNAL MEDICINE CLINIC | Age: 73
End: 2018-01-15

## 2018-01-15 VITALS
WEIGHT: 185 LBS | DIASTOLIC BLOOD PRESSURE: 80 MMHG | RESPIRATION RATE: 16 BRPM | HEIGHT: 64 IN | TEMPERATURE: 97.7 F | SYSTOLIC BLOOD PRESSURE: 147 MMHG | BODY MASS INDEX: 31.58 KG/M2 | OXYGEN SATURATION: 96 % | HEART RATE: 68 BPM

## 2018-01-15 DIAGNOSIS — R05.9 COUGH: ICD-10-CM

## 2018-01-15 DIAGNOSIS — H73.891 ERYTHEMA OF TYMPANIC MEMBRANE OF RIGHT EAR: Primary | ICD-10-CM

## 2018-01-15 PROBLEM — H66.002 ACUTE SUPPURATIVE OTITIS MEDIA OF LEFT EAR WITHOUT SPONTANEOUS RUPTURE OF TYMPANIC MEMBRANE: Status: ACTIVE | Noted: 2018-01-15

## 2018-01-15 RX ORDER — GUAIFENESIN 600 MG/1
600 TABLET, EXTENDED RELEASE ORAL 2 TIMES DAILY
Qty: 20 TAB | Refills: 0 | Status: SHIPPED | OUTPATIENT
Start: 2018-01-15 | End: 2018-01-25

## 2018-01-15 RX ORDER — NEOMYCIN SULFATE, POLYMYXIN B SULFATE AND HYDROCORTISONE 10; 3.5; 1 MG/ML; MG/ML; [USP'U]/ML
3 SUSPENSION/ DROPS AURICULAR (OTIC) 3 TIMES DAILY
Qty: 10 ML | Refills: 0 | Status: SHIPPED | OUTPATIENT
Start: 2018-01-15 | End: 2018-01-22

## 2018-01-15 NOTE — PROGRESS NOTES
HISTORY OF PRESENT ILLNESS  Alexandre Huitron is a 67 y.o. female. HPI Comments: Patient was here to follow up on er recent respiratory infection. She finished her antibiotics. She states that she is feeling much better, but cough is still lingering, along with ear pain, sinus congestion. Follow-up   The history is provided by the patient. Associated symptoms include headaches. Pertinent negatives include no chest pain. Review of Systems   Constitutional: Negative for chills, fever and malaise/fatigue. HENT: Positive for congestion and ear pain. Negative for ear discharge, nosebleeds, sinus pain and sore throat. Eyes: Negative for blurred vision, double vision and photophobia. Respiratory: Positive for cough. Negative for sputum production and wheezing. Cardiovascular: Negative for chest pain and palpitations. Gastrointestinal: Negative for heartburn, nausea and vomiting. Genitourinary: Negative for dysuria, frequency and urgency. Musculoskeletal: Negative for myalgias. Neurological: Positive for headaches. Negative for dizziness. Endo/Heme/Allergies: Negative for environmental allergies and polydipsia. Psychiatric/Behavioral: The patient is not nervous/anxious. Physical Exam   Constitutional: She is oriented to person, place, and time. She appears well-developed and well-nourished. No distress. HENT:   Head: Normocephalic and atraumatic. Right Ear: Tympanic membrane is erythematous. A middle ear effusion is present. Left Ear: Tympanic membrane is erythematous. A middle ear effusion is present. Mouth/Throat: No oropharyngeal exudate, posterior oropharyngeal edema, posterior oropharyngeal erythema or tonsillar abscesses. Eyes: EOM are normal. Pupils are equal, round, and reactive to light. Neck: Neck supple. Cardiovascular: Regular rhythm. Pulmonary/Chest: Effort normal and breath sounds normal. No respiratory distress. She has no wheezes.    Lymphadenopathy: She has no cervical adenopathy. Neurological: She is alert and oriented to person, place, and time. No cranial nerve deficit. Skin: Skin is dry. She is not diaphoretic. Psychiatric: She has a normal mood and affect. Nursing note and vitals reviewed. ASSESSMENT and PLAN    ICD-10-CM ICD-9-CM    1. Erythema of tympanic membrane of right ear H73.891 385.89 neomycin-polymyxin-hydrocortisone, buffered, (PEDIOTIC) 3.5-10,000-1 mg/mL-unit/mL-% otic suspension   2. Cough R05 786.2 guaiFENesin ER (MUCINEX) 600 mg ER tablet   patient advised to report back worsening cough and fever.

## 2018-01-15 NOTE — PATIENT INSTRUCTIONS

## 2018-01-15 NOTE — MR AVS SNAPSHOT
Visit Information Date & Time Provider Department Dept. Phone Encounter #  
 1/15/2018 10:15 AM Robert Mann NP [facility] [phone] [id number] Your Appointments 2/20/2018  9:30 AM  
Office Visit with Odalys Lujan MD  
[facility]) Appt Note: 3 month f/u HTN/DM  
 [address]  
  
   
 [address] [facility] Upcoming Health Maintenance Date Due MICROALBUMIN Q1 7/28/2017 EYE EXAM RETINAL OR DILATED Q1 12/1/2017 HEMOGLOBIN A1C Q6M 1/27/2018 BREAST CANCER SCRN MAMMOGRAM 3/27/2018 LIPID PANEL Q1 7/27/2018 FOOT EXAM Q1 9/18/2018 MEDICARE YEARLY EXAM 11/14/2018 GLAUCOMA SCREENING Q2Y 12/1/2018 COLONOSCOPY 7/12/2023 DTaP/Tdap/Td series (2 - Td) 5/19/2025 Allergies as of 1/15/2018  Review Complete On: 1/15/2018 By: Zeb Bryan Severity Noted Reaction Type Reactions Amoxicillin  12/18/2014    Rash Ampicillin  03/04/2015    Rash Codeine  11/15/2011   Side Effect Nausea and Vomiting Tussionex  01/15/2014   Side Effect Other (comments) Hallucinations Valium [Diazepam]  11/15/2011   Systemic Vertigo Current Immunizations  Reviewed on 1/27/2014 Name Date Influenza Vaccine 9/30/2013 10:40 AM  
 Pneumococcal Conjugate (PCV-13) 11/1/2016 11:59 AM  
 Pneumococcal Polysaccharide (PPSV-23) 1/27/2014  9:20 AM  
 Tdap 5/19/2015 Not reviewed this visit You Were Diagnosed With   
  
 Codes Comments Erythema of tympanic membrane of right ear    -  Primary ICD-10-CM: T58.825 ICD-9-CM: 385.89 Cough     ICD-10-CM: R05 ICD-9-CM: 644. 2 Vitals BP Pulse Temp Resp Height(growth percentile) Weight(growth percentile) 147/80 (BP 1 Location: Right arm, BP Patient Position: Sitting) 68 97.7 °F (36.5 °C) (Oral) 16 5' 4\" (1.626 m) 185 lb (83.9 kg) SpO2 BMI OB Status Smoking Status 96% 31.76 kg/m2 Hysterectomy Never Smoker Vitals History BMI and BSA Data Body Mass Index Body Surface Area 31.76 kg/m 2 1.95 m 2 Preferred Pharmacy Pharmacy Name Phone [facility], [address] [phone] Your Updated Medication List  
  
   
This list is accurate as of: 1/15/18 11:04 AM.  Always use your most recent med list.  
  
  
  
  
 albuterol 90 mcg/actuation inhaler Commonly known as:  PROVENTIL HFA, VENTOLIN HFA, PROAIR HFA Take 2 Puffs by inhalation every six (6) hours as needed for Wheezing. Indications: BRONCHOSPASM PREVENTION  
  
 BABY ASPIRIN PO Take 81 mg by mouth. diclofenac 1 % Gel Commonly known as:  VOLTAREN  
APPLY QUARTER SIZED AMOUNT TO AFFECTED AREA(S) 3 TIMES DAILY  
  
 ergocalciferol 50,000 unit capsule Commonly known as:  ERGOCALCIFEROL Take 50,000 Units by mouth every seven (7) days. * guaiFENesin  mg ER tablet Commonly known as:  Rey & Rey Take 1 Tab by mouth two (2) times a day. Indications: COLD SYMPTOMS, Cough * guaiFENesin  mg ER tablet Commonly known as:  Rey & Rey Take 1 Tab by mouth two (2) times a day for 10 days. hydrocortisone 2.5 % topical cream  
Commonly known as:  HYTONE  
APPLY TO THE AFFECTED AREAS 2 TO 3 TIMES DAILY  
  
 loratadine 10 mg tablet Commonly known as:  Devang Neumann Take 10 mg by mouth.  
  
 metoprolol tartrate 50 mg tablet Commonly known as:  LOPRESSOR Take one tablet by mouth one time daily  
  
 montelukast 10 mg tablet Commonly known as:  SINGULAIR  
TAKE 1 TABLET BY MOUTH NIGHTLY AT BEDTIME  
  
 neomycin-polymyxin-hydrocortisone (buffered) 3.5-10,000-1 mg/mL-unit/mL-% otic suspension Commonly known as:  Jennifer Raisin Administer 3 Drops into each ear three (3) times daily for 7 days. simvastatin 40 mg tablet Commonly known as:  ZOCOR  
 TAKE ONE TABLET BY MOUTH IN THE EVENING  
  
 spironolactone 50 mg tablet Commonly known as:  ALDACTONE Take 1 Tab by mouth daily. SYNTHROID 100 mcg tablet Generic drug:  levothyroxine Take 1 Tab by mouth daily. Indications: hypothyroidism  
  
 tiZANidine 2 mg tablet Commonly known as:  Adi Coupe Take 1 Tab by mouth every six (6) hours as needed. * Notice: This list has 2 medication(s) that are the same as other medications prescribed for you. Read the directions carefully, and ask your doctor or other care provider to review them with you. Prescriptions Sent to Pharmacy Refills  
 neomycin-polymyxin-hydrocortisone, buffered, (PEDIOTIC) 3.5-10,000-1 mg/mL-unit/mL-% otic suspension 0 Sig: Administer 3 Drops into each ear three (3) times daily for 7 days. Class: Normal  
 Pharmacy: [address] #: [phone] Route: Both Ears  
 guaiFENesin ER (MUCINEX) 600 mg ER tablet 0 Sig: Take 1 Tab by mouth two (2) times a day for 10 days. Class: Normal  
 Pharmacy: [address] #: [phone] Route: Oral  
  
Patient Instructions Cough: Care Instructions Your Care Instructions A cough is your body's response to something that bothers your throat or airways. Many things can cause a cough. You might cough because of a cold or the flu, bronchitis, or asthma. Smoking, postnasal drip, allergies, and stomach acid that backs up into your throat also can cause coughs. A cough is a symptom, not a disease. Most coughs stop when the cause, such as a cold, goes away. You can take a few steps at home to cough less and feel better. Follow-up care is a key part of your treatment and safety. Be sure to make and go to all appointments, and call your doctor if you are having problems.  It's also a good idea to know your test results and keep a list of the medicines you take. How can you care for yourself at home? · Drink lots of water and other fluids. This helps thin the mucus and soothes a dry or sore throat. Honey or lemon juice in hot water or tea may ease a dry cough. · Take cough medicine as directed by your doctor. · Prop up your head on pillows to help you breathe and ease a dry cough. · Try cough drops to soothe a dry or sore throat. Cough drops don't stop a cough. Medicine-flavored cough drops are no better than candy-flavored drops or hard candy. · Do not smoke. Avoid secondhand smoke. If you need help quitting, talk to your doctor about stop-smoking programs and medicines. These can increase your chances of quitting for good. When should you call for help? Call 911 anytime you think you may need emergency care. For example, call if: 
? · You have severe trouble breathing. ?Call your doctor now or seek immediate medical care if: 
? · You cough up blood. ? · You have new or worse trouble breathing. ? · You have a new or higher fever. ? · You have a new rash. ? Watch closely for changes in your health, and be sure to contact your doctor if: 
? · You cough more deeply or more often, especially if you notice more mucus or a change in the color of your mucus. ? · You have new symptoms, such as a sore throat, an earache, or sinus pain. ? · You do not get better as expected. Where can you learn more? Go to http://mane-isabella.info/. Enter D279 in the search box to learn more about \"Cough: Care Instructions. \" Current as of: May 12, 2017 Content Version: 11.4 © 3840-1837 Yipit. Care instructions adapted under license by Sparq Systems (which disclaims liability or warranty for this information).  If you have questions about a medical condition or this instruction, always ask your healthcare professional. [place] [address] any warranty or liability for your use of this information. Introducing [facility] & HEALTH SERVICES! Dear Sis Christian: Thank you for requesting a [facility] (NIH) account. Our records indicate that you already have an active [facility] of [facility] (NIH) account. You can access your account anytime at [email or web address] Did you know that you can access your hospital and ER discharge instructions at any time in [facility] of Health (NIH)? You can also review all of your test results from your hospital stay or ER visit. Additional Information If you have questions, please visit the Frequently Asked Questions section of the National Institutes of Health (NIH) website at [email or web address] Remember, National Institutes of Health (NIH) is NOT to be used for urgent needs. For medical emergencies, dial 911. Now available from your iPhone and Android! Please provide this summary of care documentation to your next provider. Your primary care clinician is listed as [facility]. If you have any questions after today's visit, please call [phone].

## 2018-01-15 NOTE — PROGRESS NOTES
ROOM # [id number]    Marielle Lobato presents today for   Chief Complaint   Patient presents with    Nasal Congestion     x 3 weeks     Cough    Pressure Behind the Eyes       Marielle Lobato preferred language for health care discussion is english/other. Is someone accompanying this pt? no    Is the patient using any DME equipment during OV? no    Depression Screening:  PHQ over the last two weeks 12/27/2017 12/5/2017 11/13/2017 11/13/2017 7/13/2017 3/11/2017 3/1/2017   Little interest or pleasure in doing things Not at all Not at all More than half the days Not at all Not at all Not at all Not at all   Feeling down, depressed or hopeless Not at all Not at all More than half the days Not at all Not at all Not at all Not at all   Total Score PHQ 2 0 0 4 0 0 0 0       Learning Assessment:  Learning Assessment 5/19/2015   PRIMARY LEARNER Patient   HIGHEST LEVEL OF EDUCATION - PRIMARY LEARNER  > 4 YEARS [address] CAREGIVER No   PRIMARY LANGUAGE ENGLISH   LEARNER PREFERENCE PRIMARY READING     DEMONSTRATION   ANSWERED BY Patient   RELATIONSHIP SELF       Abuse Screening:  Abuse Screening Questionnaire 11/13/2017 5/19/2015   Do you ever feel afraid of your partner? N N   Are you in a relationship with someone who physically or mentally threatens you? N N   Is it safe for you to go home? Y Y       Fall Risk  Fall Risk Assessment, last 12 mths 12/27/2017 12/5/2017 11/13/2017 7/13/2017 3/11/2017 3/1/2017 7/28/2016   Able to walk? Yes Yes Yes Yes Yes Yes Yes   Fall in past 12 months? No No No No No No No       Health Maintenance reviewed and discussed per provider. Yes    Marielle Lobato is due for nothing at this time. Please order/place referral if appropriate. Advance Directive:  1. Do you have an advance directive in place? Patient Reply: no    2. If not, would you like material regarding how to put one in place? Patient Reply: no    Coordination of Care:  1.  Have you been to the ER, urgent care clinic since your last visit? Hospitalized since your last visit? no    2. Have you seen or consulted any other health care providers outside of the [address] [place] [address] since your last visit? Include any pap smears or colon screening.  no
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
## 2018-02-20 ENCOUNTER — OFFICE VISIT (OUTPATIENT)
Dept: INTERNAL MEDICINE CLINIC | Age: 73
End: 2018-02-20

## 2018-02-20 ENCOUNTER — HOSPITAL ENCOUNTER (OUTPATIENT)
Dept: LAB | Age: 73
Discharge: HOME | End: 2018-02-20
Payer: MEDICARE

## 2018-02-20 VITALS
BODY MASS INDEX: 31.58 KG/M2 | RESPIRATION RATE: 16 BRPM | HEIGHT: 64 IN | TEMPERATURE: 98.1 F | SYSTOLIC BLOOD PRESSURE: 121 MMHG | HEART RATE: 69 BPM | OXYGEN SATURATION: 98 % | WEIGHT: 185 LBS | DIASTOLIC BLOOD PRESSURE: 73 MMHG

## 2018-02-20 DIAGNOSIS — E78.00 HYPERCHOLESTEROLEMIA: Chronic | ICD-10-CM

## 2018-02-20 DIAGNOSIS — E11.21 TYPE 2 DIABETES MELLITUS WITH NEPHROPATHY (HCC): Primary | ICD-10-CM

## 2018-02-20 DIAGNOSIS — I11.9 HYPERTENSIVE HEART DISEASE WITHOUT HEART FAILURE: Chronic | ICD-10-CM

## 2018-02-20 DIAGNOSIS — E11.21 TYPE 2 DIABETES MELLITUS WITH NEPHROPATHY (HCC): ICD-10-CM

## 2018-02-20 DIAGNOSIS — E03.9 HYPOTHYROIDISM, UNSPECIFIED TYPE: Chronic | ICD-10-CM

## 2018-02-20 DIAGNOSIS — R42 VERTIGO: ICD-10-CM

## 2018-02-20 PROBLEM — E11.9 CONTROLLED TYPE 2 DIABETES MELLITUS WITHOUT COMPLICATION, WITHOUT LONG-TERM CURRENT USE OF INSULIN (HCC): Chronic | Status: RESOLVED | Noted: 2017-03-01 | Resolved: 2018-02-20

## 2018-02-20 LAB
ALBUMIN SERPL-MCNC: 3.8 G/DL (ref 3.4–5)
ALBUMIN/GLOB SERPL: 1.1 {RATIO} (ref 0.8–1.7)
ALP SERPL-CCNC: 44 U/L (ref 45–117)
ALT SERPL-CCNC: 15 U/L (ref 13–56)
ANION GAP SERPL CALC-SCNC: 8 MMOL/L (ref 3–18)
AST SERPL-CCNC: 18 U/L (ref 15–37)
BILIRUB SERPL-MCNC: 0.3 MG/DL (ref 0.2–1)
BUN SERPL-MCNC: 10 MG/DL (ref 7–18)
BUN/CREAT SERPL: 9 (ref 12–20)
CALCIUM SERPL-MCNC: 8.9 MG/DL (ref 8.5–10.1)
CHLORIDE SERPL-SCNC: 107 MMOL/L (ref 100–108)
CHOLEST SERPL-MCNC: 149 MG/DL
CO2 SERPL-SCNC: 27 MMOL/L (ref 21–32)
CREAT SERPL-MCNC: 1.15 MG/DL (ref 0.6–1.3)
GLOBULIN SER CALC-MCNC: 3.4 G/DL (ref 2–4)
GLUCOSE SERPL-MCNC: 82 MG/DL (ref 74–99)
HBA1C MFR BLD: 6.5 % (ref 4.2–5.6)
HDLC SERPL-MCNC: 36 MG/DL (ref 40–60)
HDLC SERPL: 4.1 {RATIO} (ref 0–5)
LDLC SERPL CALC-MCNC: 85.4 MG/DL (ref 0–100)
LIPID PROFILE,FLP: ABNORMAL
POTASSIUM SERPL-SCNC: 3.9 MMOL/L (ref 3.5–5.5)
PROT SERPL-MCNC: 7.2 G/DL (ref 6.4–8.2)
SODIUM SERPL-SCNC: 142 MMOL/L (ref 136–145)
T4 FREE SERPL-MCNC: 1.3 NG/DL (ref 0.7–1.5)
TRIGL SERPL-MCNC: 138 MG/DL (ref ?–150)
TSH SERPL DL<=0.05 MIU/L-ACNC: 2.18 UIU/ML (ref 0.36–3.74)
VLDLC SERPL CALC-MCNC: 27.6 MG/DL

## 2018-02-20 PROCEDURE — 83036 HEMOGLOBIN GLYCOSYLATED A1C: CPT | Performed by: INTERNAL MEDICINE

## 2018-02-20 PROCEDURE — 80053 COMPREHEN METABOLIC PANEL: CPT | Performed by: INTERNAL MEDICINE

## 2018-02-20 PROCEDURE — 36415 COLL VENOUS BLD VENIPUNCTURE: CPT | Performed by: INTERNAL MEDICINE

## 2018-02-20 PROCEDURE — 84439 ASSAY OF FREE THYROXINE: CPT | Performed by: INTERNAL MEDICINE

## 2018-02-20 PROCEDURE — 82043 UR ALBUMIN QUANTITATIVE: CPT | Performed by: INTERNAL MEDICINE

## 2018-02-20 PROCEDURE — 80061 LIPID PANEL: CPT | Performed by: INTERNAL MEDICINE

## 2018-02-20 RX ORDER — SCOLOPAMINE TRANSDERMAL SYSTEM 1 MG/1
PATCH, EXTENDED RELEASE TRANSDERMAL
Refills: 3 | COMMUNITY
Start: 2017-12-23 | End: 2018-11-07

## 2018-02-20 NOTE — MR AVS SNAPSHOT
[address] [place] [address] 
 
 
 [address] [facility] [address] [address] 
[phone] Patient: Jean-Paul Miller MRN: [id number] [facility]:6/75/[birth year]503 Visit Information Date & Time Provider Department Dept. Phone Encounter #  
 2/20/2018  9:30 AM MD [address] [phone] [id number] Follow-up Instructions Return in about 4 months (around 6/20/2018) for HTN, DM, cholesterol, thryoid disorder. Upcoming Health Maintenance Date Due MICROALBUMIN Q1 7/28/2017 HEMOGLOBIN A1C Q6M 1/27/2018 BREAST CANCER SCRN MAMMOGRAM 3/27/2018 LIPID PANEL Q1 7/27/2018 FOOT EXAM Q1 9/18/2018 MEDICARE YEARLY EXAM 11/14/2018 EYE EXAM RETINAL OR DILATED Q1 12/7/2018 GLAUCOMA SCREENING Q2Y 12/7/2019 COLONOSCOPY 7/12/2023 DTaP/Tdap/Td series (2 - Td) 5/19/2025 Allergies as of 2/20/2018  Review Complete On: 2/20/2018 By: Lula Eaton MD  
  
 Severity Noted Reaction Type Reactions Amoxicillin  12/18/2014    Rash Ampicillin  03/04/2015    Rash Codeine  11/15/2011   Side Effect Nausea and Vomiting Tussionex  01/15/2014   Side Effect Other (comments) Hallucinations Valium [Diazepam]  11/15/2011   Systemic Vertigo Current Immunizations  Reviewed on 1/16/2018 Name Date Influenza Vaccine 11/1/2015, 9/30/2013 10:40 AM  
 Pneumococcal Conjugate (PCV-13) 11/1/2016 11:59 AM  
 Pneumococcal Polysaccharide (PPSV-23) 1/27/2014  9:20 AM  
 Tdap 5/19/2015 Not reviewed this visit You Were Diagnosed With   
  
 Codes Comments Type 2 diabetes mellitus with nephropathy ([facility] [address]    -  Primary ICD-10-CM: E11.21 
ICD-9-CM: 250.40, 583.81 Hypertensive heart disease without heart failure     ICD-10-CM: I11.9 ICD-9-CM: 402.90 Hypercholesterolemia     ICD-10-CM: E78.00 ICD-9-CM: 272.0 Hypothyroidism, unspecified type     ICD-10-CM: E03.9 ICD-9-CM: 894. 9  Vertigo     ICD-10-CM: B54 
 ICD-9-CM: 780.4 Vitals BP Pulse Temp Resp Height(growth percentile) Weight(growth percentile) 121/73 (BP 1 Location: Left arm, BP Patient Position: Sitting) 69 98.1 °F (36.7 °C) (Oral) 16 5' 4\" (1.626 m) 185 lb (83.9 kg) SpO2 BMI OB Status Smoking Status 98% 31.76 kg/m2 Hysterectomy Never Smoker Vitals History BMI and BSA Data Body Mass Index Body Surface Area 31.76 kg/m 2 1.95 m 2 Preferred Pharmacy Pharmacy Name Phone [facility] [facility], [address] [phone] Your Updated Medication List  
  
   
This list is accurate as of: 2/20/18 10:12 AM.  Always use your most recent med list.  
  
  
  
  
 albuterol 90 mcg/actuation inhaler Commonly known as:  PROVENTIL HFA, VENTOLIN HFA, PROAIR HFA Take 2 Puffs by inhalation every six (6) hours as needed for Wheezing. Indications: BRONCHOSPASM PREVENTION  
  
 BABY ASPIRIN PO Take 81 mg by mouth. diclofenac 1 % Gel Commonly known as:  VOLTAREN  
APPLY QUARTER SIZED AMOUNT TO AFFECTED AREA(S) 3 TIMES DAILY  
  
 ergocalciferol 50,000 unit capsule Commonly known as:  ERGOCALCIFEROL Take 50,000 Units by mouth every seven (7) days. guaiFENesin  mg ER tablet Commonly known as:  Rey & Rey Take 1 Tab by mouth two (2) times a day. Indications: COLD SYMPTOMS, Cough  
  
 hydrocortisone 2.5 % topical cream  
Commonly known as:  HYTONE  
APPLY TO THE AFFECTED AREAS 2 TO 3 TIMES DAILY  
  
 loratadine 10 mg tablet Commonly known as:  Larry Media Take 10 mg by mouth.  
  
 metoprolol tartrate 50 mg tablet Commonly known as:  LOPRESSOR Take one tablet by mouth one time daily  
  
 montelukast 10 mg tablet Commonly known as:  SINGULAIR  
TAKE 1 TABLET BY MOUTH NIGHTLY AT BEDTIME  
  
 scopolamine 1 mg over 3 days Pt3d Commonly known as:  TRANSDERM-SCOP  
APPLY 1 PATCH BEHIND RIGHT EAR EVERY 72 HOURS AS NEEDED FOR DIZZINESS simvastatin 40 mg tablet Commonly known as:  ZOCOR  
TAKE ONE TABLET BY MOUTH IN THE EVENING  
  
 spironolactone 50 mg tablet Commonly known as:  ALDACTONE Take 1 Tab by mouth daily. SYNTHROID 100 mcg tablet Generic drug:  levothyroxine Take 1 Tab by mouth daily. Indications: hypothyroidism  
  
 tiZANidine 2 mg tablet Commonly known as:  Vonda Anaya Take 1 Tab by mouth every six (6) hours as needed. Follow-up Instructions Return in about 4 months (around 6/20/2018) for HTN, DM, cholesterol, thryoid disorder. To-Do List   
 02/20/2018 Lab:  HEMOGLOBIN A1C W/O EAG   
  
 02/20/2018 Lab:  LIPID PANEL   
  
 02/20/2018 Lab:  METABOLIC PANEL, COMPREHENSIVE   
  
 02/20/2018 Lab:  MICROALBUMIN, UR, RAND W/ MICROALBUMIN/CREA RATIO   
  
 02/20/2018 Lab:  TSH AND FREE T4 Introducing [facility] SERVICES! Dear Tim Gave: Thank you for requesting a [employer] account. Our records indicate that you already have an active [employer]Mine account. You can access your account anytime at [email or web address] Did you know that you can access your hospital and ER discharge instructions at any time in RealityMine? You can also review all of your test results from your hospital stay or ER visit. Additional Information If you have questions, please visit the Frequently Asked Questions section of the RealityMine website at [email or web address] Remember, RealityMine is NOT to be used for urgent needs. For medical emergencies, dial 911. Now available from your iPhone and Android! Please provide this summary of care documentation to your next provider. Your primary care clinician is listed as [facility]. If you have any questions after today's visit, please call [phone].

## 2018-02-20 NOTE — PROGRESS NOTES
Identified pt with two pt identifiers(name and ). Reviewed record in preparation for visit and have obtained necessary documentation. Chief Complaint   Patient presents with    Diabetes     (Rm #6) 3mo f/u    Hypertension        Health Maintenance Due   Topic    MICROALBUMIN Q1     HEMOGLOBIN A1C Q6M     BREAST CANCER SCRN MAMMOGRAM    - pt states she is in process of scheduling appt @ [facility]:  :   1) Have you been to an emergency room, urgent care clinic since your last visit? no   Hospitalized since your last visit? no             2. Have seen or consulted any other health care provider since your last visit? YES  If yes, where when, and reason for visit? 18: Dr. Alejandra Boyer (ENT)    3) Do you have an Advanced Directive/ Living Will in place? NO  If yes, do we have a copy on file NO  If no, would you like information NO    Patient is accompanied by self I have received verbal consent from Ariel Zamora to discuss any/all medical information while they are present in the room.

## 2018-02-20 NOTE — PROGRESS NOTES
HISTORY OF PRESENT ILLNESS  Maxime Torrez is a 67 y.o. female. Visit Vitals    /73 (BP 1 Location: Left arm, BP Patient Position: Sitting)    Pulse 69    Temp 98.1 °F (36.7 °C) (Oral)    Resp 16    Ht 5' 4\" (1.626 m)    Wt 185 lb (83.9 kg)    SpO2 98%    BMI 31.76 kg/m2       Diabetes   The history is provided by the patient. This is a chronic problem. The current episode started more than 1 week ago. The problem occurs daily. The problem has not changed since onset. Pertinent negatives include no chest pain, no headaches and no shortness of breath. Exacerbated by: diet. The symptoms are relieved by medications (diet). Hypertension    The history is provided by the patient. This is a chronic problem. The current episode started more than 1 week ago. The problem has not changed since onset. Associated symptoms include dizziness. Pertinent negatives include no chest pain, no palpitations, no blurred vision, no headaches and no shortness of breath. There are no associated agents to hypertension. Risk factors include obesity, a sedentary lifestyle and diabetes mellitus. Thyroid Problem   The history is provided by the patient. This is a chronic problem. The current episode started more than 1 week ago. The problem occurs daily. The problem has not changed since onset. Pertinent negatives include no chest pain, no headaches and no shortness of breath. The symptoms are relieved by medications. Treatments tried: synthroid. The treatment provided moderate relief. Review of Systems   Constitutional: Negative. Eyes: Negative for blurred vision. Respiratory: Negative for shortness of breath. Cardiovascular: Negative for chest pain and palpitations. Neurological: Positive for dizziness. Negative for tingling, sensory change, speech change, focal weakness and headaches. Physical Exam   Constitutional: She is oriented to person, place, and time. She appears well-developed and well-nourished. No distress. Cardiovascular: Normal rate and regular rhythm. Pulmonary/Chest: Effort normal and breath sounds normal.   Musculoskeletal: She exhibits no edema. Neurological: She is alert and oriented to person, place, and time. No gross neurological sxs   Skin: Skin is warm and dry. She is not diaphoretic. Psychiatric: She has a normal mood and affect. Nursing note and vitals reviewed. ASSESSMENT and PLAN    ICD-10-CM ICD-9-CM    1. Type 2 diabetes mellitus with nephropathy (HCC) T70.19 674.49 METABOLIC PANEL, COMPREHENSIVE     583.81 HEMOGLOBIN A1C W/O EAG      MICROALBUMIN, UR, RAND W/ MICROALBUMIN/CREA RATIO   2. Hypertensive heart disease without heart failure Y50.2 824.15 METABOLIC PANEL, COMPREHENSIVE   3. Hypercholesterolemia E78.00 272.0 LIPID PANEL   4. Hypothyroidism, unspecified type E03.9 244.9 TSH AND FREE T4   5. Vertigo R42 780.4        BP controlled    BS has been good    Dizziness--likely BPPV. Discussed exercises. Reassured her re driving. Sxs are very unlikely to occur while driving. Update lab    Discussed BMI/weight, lifestyle, diet and exercise. Discussed effect on blood pressure, blood sugar, and joints especially  Focus on limiting white carbs, portion control, and moving more.     F/u 4 months

## 2018-02-21 LAB
CREAT UR-MCNC: 136.91 MG/DL (ref 30–125)
MICROALBUMIN UR-MCNC: 0.6 MG/DL (ref 0–3)
MICROALBUMIN/CREAT UR-RTO: 4 MG/G (ref 0–30)

## 2018-04-25 ENCOUNTER — HOSPITAL ENCOUNTER (OUTPATIENT)
Dept: PHYSICAL THERAPY | Age: 73
Discharge: HOME | End: 2018-04-25
Payer: MEDICARE

## 2018-04-25 PROCEDURE — 97163 PT EVAL HIGH COMPLEX 45 MIN: CPT

## 2018-04-25 PROCEDURE — G8979 MOBILITY GOAL STATUS: HCPCS

## 2018-04-25 PROCEDURE — G8978 MOBILITY CURRENT STATUS: HCPCS

## 2018-04-25 NOTE — PROGRESS NOTES
PHYSICAL THERAPY - DAILY TREATMENT NOTE    Patient Name: Peter Bassett        Date: 2018  : 1945   YES Patient  Verified  Visit #:   1   of   8  Insurance: Payor: Cherrie Akhtar / Plan: [place] MEDICARE PART A & B / Product Type: Medicare /      In time: 8:32am Out time: 9:38am   Total Treatment Time: 66     Medicare Time Tracking (below)   Total Timed Codes (min):  00 1:1 Treatment Time:  00     TREATMENT AREA =  Difficulty in walking [R26.2]  Benign positional vertigo [H81.10]  SUBJECTIVE  Pain Level (on 0 to 10 scale):  0  / 10   Medication Changes/New allergies or changes in medical history, any new surgeries or procedures? NO    If yes, update Summary List   Subjective Functional Status/Changes:  []  No changes reported     See POC         OBJECTIVE  Modalities Rationale:   N/a       min Patient Education:  YES  Reviewed HEP   []  Progressed/Changed HEP based on: Other Objective/Functional Measures:  Physical Therapy Evaluation - Vestibular    Posture:        [x] Forward head    [x] Protracted shoulders    [] Retracted shoulders  [] Kyphosis:  WNLs   [] Lordosis:   [] increased   [x] decreased    C/S ROM: [] WFL    [x] Limited    Describe: R C/S Rot and SB  C/S Flex = 20 deg  C/S Ext = 32 deg  C/S R/L SB = 18/25 deg  C/S R/L Rot =  Strength:  Describe: See POC    Optional Tests:  Sensation:  Describe: TY UE and LE light touch sensation WNLs    Coordination Testing:       Disdiadochokinesia [x] WFL    [] Impaired    Describe:       Heel - Shin  [x] [facility]    [] Impaired    Describe: Toe Tap   [x] [facility]    [] Impaired    Describe:    Oculomotor Tests: (Fixation Not Blocked)       Ocular ROM:   [x] WFL    [] Limited    Describe:       Spontaneous Nystag. [x] Neg     [] Pos    [] Left    [] Right       Gaze Holding Nystag.  [] Neg     [x] Pos    [x] Left    [x] Right        Smooth Pursuit  [] Neg     [x] Pos    [] Left    [x] Right        Saccades   [] Neg     [x] Pos    [] Left    [x] Right VOR - Slow Head Mvmt [] Neg     [x] Pos    [] Left    [x] Right        VOR - Fast Head Mvmt [] Neg     [x] Pos    [] Left    [x] Right        Head Thrust  [x] Neg     [] Pos    [] Left    [] Right Incr dizziness TY    Other Special Tests:       Hallpike-Luciano Maneuver [] Neg     [] Pos    [] Left    [] Right       Dynamic Gait Index [] Neg     [x] Pos    Score: 5/24    Balance Standard Testing (Eyes Open/Eyes Closed - EO/EC)       Wide stance   WFL    [] Pos    Describe: 2/1        Romberg - unable         Stand on Foam  [] WFL    [] Pos    Describe: 1/0 (wide)         Standing on Rail  [] WFL    [] Pos    Describe: 0/NT        Sharpened Romberg [] WFL    [] Pos    Describe: 1/0        Single Leg Stand  [] WFL    [] Pos    Describe: NT/NT     Motion Sensitivity:  [] Neg     [x] Pos    Describe:    Neuro Screen (myotome/dematome/felexes): [] WNL  C3 - T2 TY UE light touch sensation WNLs  Myotome Level Muscle Test Myotome Level Muscle Test   C5  C5,6  Shoulder Abd - R/L 4-/4-  Biceps - R/L 4/4+ C8 Finger Flexors - R/L 3+/4-   C6  C7  Wrist Extension - R/L 4-/4  Wrist Flexion - R/L 4/4 T1 Finger Abduction - Interossei R/L 4-/4-   C7 Elbow Extension - R/L 5-/5       Int Rotators: R/L 4/4   Ext Rotators: R/L 4+/4              Supraspinatus: R/L 4-/4-    Other Tests:Justification for Eval Complexity:   Patient History: HIGH Complexity :3+ comorbidities / personal factors will impact the outcome/ POC  (Arthritis (Lumbar Spine), DM, Hypothyroidism, HTN, CKD Stage 3, Neck pain)  Examination:HIGH Complexity : 4+ Standardized tests and measures addressing body structure, function, activity limitation and / or participation in recreation  (See POC and musculoskeletal examination attached, 5/24 DGI,(-) Thad Squibb)  Clinical Presentation: HIGH Complexity : Unstable and unpredictable characteristics  (Chronic hx of dizziness, neck pain, multiple trials of PT services, spinning, significantly impaired ambulation and balance )  Clinical Decision Making:Other outcome measures DGI 5/24  HIGH  (Foto 61/100) (DHI 68/100) (5/24 DGI)  Overall Complexity:HIGH      Post Treatment Pain Level (on 0 to 10) scale:   0  / 10     ASSESSMENT  Assessment/Changes in Function:     See POC   []  See Progress Note/Recertification   Patient will continue to benefit from skilled PT services to modify and progress therapeutic interventions, address functional mobility deficits, address ROM deficits, address strength deficits, analyze and address soft tissue restrictions, analyze and cue movement patterns, analyze and modify body mechanics/ergonomics and assess and modify postural abnormalities to attain remaining goals.    Progress toward goals / Updated goals:    See POC     PLAN  [x]  Upgrade activities as tolerated YES Continue plan of care   []  Discharge due to :    []  Other:      Therapist: Arnoldo Olivo DPT     Date: 4/25/2018 Time: 8:48 AM     Future Appointments  Date Time Provider Arielle Guerrero   6/19/2018 9:00 AM Eriberto Maldonado MD [id number] [facility]
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
## 2018-05-02 ENCOUNTER — HOSPITAL ENCOUNTER (OUTPATIENT)
Dept: PHYSICAL THERAPY | Age: 73
Discharge: HOME | End: 2018-05-02
Payer: MEDICARE

## 2018-05-02 PROCEDURE — 97112 NEUROMUSCULAR REEDUCATION: CPT

## 2018-05-02 PROCEDURE — 97140 MANUAL THERAPY 1/> REGIONS: CPT

## 2018-05-02 NOTE — PROGRESS NOTES
PHYSICAL THERAPY - DAILY TREATMENT NOTE    Patient Name: Laura Raphael        Date: 2018  : 1945   YES Patient  Verified  Visit #:   2   of   8  Insurance: Payor: Cayden Staff / Plan: [place] MED[place]ARE PART [place] & B / Product Type: Medicare /      In time: 10:37 am Out time: 11:23 am   Total Treatment Time: 46     Medicare Time Tracking (below)   Total Timed Codes (min):  36 1:1 Treatment Time:  22     TREATMENT AREA =  Difficulty in walking [R26.2]  Benign positional vertigo [H81.10]  Neck pain [M54.2]    SUBJECTIVE  Pain Level (on 0 to 10 scale):  0 / 10   Medication Changes/New allergies or changes in medical history, any new surgeries or procedures? NO    If yes, update Summary List   Subjective Functional Status/Changes:  []  No changes reported     Pt reports mild/moderate nausea today. Avoids lying on right side secondary to feeling dizziness. Intermittent dizziness while driving. Pt reports she has noticed that the day after she spends a lot of time knitting or reading she has more imbalance.           OBJECTIVE  Modalities Rationale:     decrease edema, decrease inflammation, decrease pain and increase tissue extensibility to improve patient's ability to perform functional ADLs without dizziness   min [] Estim, type/location:                                      []  att     []  unatt     []  w/US     []  w/ice    []  w/heat    min []  Mechanical Traction: type/lbs                   []  pro   []  sup   []  int   []  cont    []  before manual    []  after manual    min []  Ultrasound, settings/location:      min []  Iontophoresis w/ dexamethasone, location:                                               []  take home patch       []  in clinic   10 min [x]  Ice     []  Heat    location/position: C/S semi reclined    min []  Vasopneumatic Device, press/temp:     min []  Other:    [x] Skin assessment post-treatment (if applicable):    [x]  intact    []  redness- no adverse reaction     []redness - adverse reaction:        8 min Neuromuscular reeduction:  [x]  See flow sheet   Rationale:      increase ROM and increase strength to improve the patients ability to perform functional ADLs without dizziness    10 min Manual Therapy: Sit STM with TrPt release to TY UT, Lev, and Rhomboid regions; pt education in self massage techniques; trigger point release with use of tennis ball   Rationale:      decrease pain, increase ROM, increase tissue extensibility, correct positional vertigo and decrease trigger points to improve patient's ability to perform functional ADLs without dizziness    18 min Therapeutic Activity: Pt education in neutral spine posture supported sitting , activity modification for pain mangement   Rationale:    improve coordination, improve balance and increase proprioception to improve the patients ability to perform functional ADLs without dizziness         min Patient Education:  YES  Reviewed HEP   []  Progressed/Changed HEP based on: Other Objective/Functional Measures: Add static balance in sitting EO/EC for HEP  Cervical AROM: LR: 50%/RR: 60%   Post Treatment Pain Level (on 0 to 10) scale:   0  / 10     ASSESSMENT  Assessment/Changes in Function:   Pt demonstrating significant fall risk in static standing. Pt able to perform static standing ~ 15 seconds. Moderate LOB with min A for recovery with walking from chair to mat. Recommended use of SPC or walker for safety in gait. Updated written HEP. Pt verbalized good understanding with written HEP. Pt highly resistant to use of any assistive device in gait. Pt reporting decreased nausea after session. Fair/good tolerance to ice. Pt required extra towel after ~ 6 min. []  See Progress Note/Recertification   Patient will continue to benefit from skilled PT services to address imbalance/dizziness and instruct in home and community integration to attain remaining goals.    Progress toward goals / Updated goals:    First visit from initial evaluation.  Progressed treatment per initial plan of care     PLAN  []  Upgrade activities as tolerated YES Continue plan of care   []  Discharge due to :    []  Other:      Therapist: Jeb Kelly PTA    Date: 5/2/2018 Time: 11:23  AM     Future Appointments  Date Time Provider Arielle Guerrero   5/9/2018 10:30 AM Jeb Kelly PTA [facility]   5/16/2018 8:30 AM [address]   5/23/2018 10:30 AM [address]   5/30/2018 10:30 AM Jeb Kelly PTA [facility]   6/6/2018 10:30 AM [facility] Shauna Martino [facility]   6/19/2018 9:00 AM Brian Castle MD [address] [place] [address]

## 2018-05-09 ENCOUNTER — HOSPITAL ENCOUNTER (OUTPATIENT)
Dept: PHYSICAL THERAPY | Age: 73
Discharge: HOME | End: 2018-05-09
Payer: MEDICARE

## 2018-05-09 PROCEDURE — 97140 MANUAL THERAPY 1/> REGIONS: CPT

## 2018-05-09 PROCEDURE — 97110 THERAPEUTIC EXERCISES: CPT

## 2018-05-09 NOTE — PROGRESS NOTES
PHYSICAL THERAPY - DAILY TREATMENT NOTE    Patient Name: Janine Bryant        Date: 2018  : 1945   YES Patient  Verified  Visit #:   3     Insurance: Payor: León Newton / Plan: [place] MEDICARE PART A & B / Product Type: Medicare /      In time: 10:42 am Out time: 11:26 am   Total Treatment Time: 44     Medicare Time Tracking (below)   Total Timed Codes (min):  34 1:1 Treatment Time:  34     TREATMENT AREA =  Difficulty in walking [R26.2]  Benign positional vertigo [H81.10]  Neck pain [M54.2]    SUBJECTIVE  Pain Level (on 0 to 10 scale): 0  / 10   Medication Changes/New allergies or changes in medical history, any new surgeries or procedures? NO    If yes, update Summary List   Subjective Functional Status/Changes:  []  No changes reported     *pt reports I've had a couple of near falls. Mild nausea today. The nausea can get really icky. Usually when I get up in the morning and I know I have to be extra careful.           OBJECTIVE  Modalities Rationale:     decrease edema, decrease inflammation, decrease pain and increase tissue extensibility to improve patient's ability to perform functional ADLs   min [] Estim, type/location:                                      []  att     []  unatt     []  w/US     []  w/ice    []  w/heat    min []  Mechanical Traction: type/lbs                   []  pro   []  sup   []  int   []  cont    []  before manual    []  after manual    min []  Ultrasound, settings/location:      min []  Iontophoresis w/ dexamethasone, location:                                               []  take home patch       []  in clinic   10 min [x]  Ice     []  Heat    location/position: Cervical semi reclined    min []  Vasopneumatic Device, press/temp:     min []  Other:    [x] Skin assessment post-treatment (if applicable):    [x]  intact    []  redness- no adverse reaction     []redness - adverse reaction:        22 min Therapeutic Exercise:  [x]  See flow sheet   Rationale: increase ROM and increase strength to improve the patients ability to perform functional ADLs     8 min Manual Therapy: Sit STM to TY Alena DENIS, C/S paraspinals -STM attempted. Rationale:      decrease pain, increase ROM, increase tissue extensibility and decrease trigger points to improve patient's ability to perform functional ADLs  4 min neuromuscular reeducation: static standing with use of rolling walking including limits of stability with CGA to improve pts ability to perform static standing     min Patient Education:  YES  Reviewed HEP   []  Progressed/Changed HEP based on: Other Objective/Functional Measures:    Left rotation: 60%; rotation: 70%  Add CS isometrics     Post Treatment Pain Level (on 0 to 10) scale:  0 / 10-moderate nausea     ASSESSMENT  Assessment/Changes in Function:     Pt reporting pain with CS retractions in sitting. Changed to OA nods in sitting with improved tolerance. Moderate tenderness with gentle STM. Moderate LOB from sit to stand with Lam for recovery. Pt demonstrating challenge in static standing balance with back of legs against chair ~ 3 seconds. Add static standing x 30 seconds with RW and limits of stability for neuromuscular re education for static standing hip and ankle balance reactions. Pt reporting moderate nausea after treatment. Pt required elevation of bed from ~ 60-80 degrees secondary to dizziness. []  See Progress Note/Recertification   Patient will continue to benefit from skilled PT services to modify and progress therapeutic interventions, address functional mobility deficits, address ROM deficits, address strength deficits, analyze and address soft tissue restrictions and instruct in home and community integration to attain remaining goals. Progress toward goals / Updated goals:  Pt requires close supervision in all transfers. Slow progress toward all goals.      PLAN  []  Upgrade activities as tolerated YES Continue plan of care   [] Discharge due to :    []  Other:      Therapist: Tiffany Valadez PTA    Date: 5/9/2018 Time: 11:26  AM     Future Appointments  Date Time Provider Arielle Guerrero   5/16/2018 8:30 AM Yadira [address] [place] [address] [address]   5/23/2018 10:30 AM Steph Sethi [place] [address]   5/30/2018 10:30 AM Tiffany Valadez PTA [place] [address]   6/6/2018 10:30 AM Elana Ornelas [place] [address]   6/19/2018 9:00 AM Ayala Khan MD [address] [facility]

## 2018-05-16 ENCOUNTER — HOSPITAL ENCOUNTER (OUTPATIENT)
Dept: PHYSICAL THERAPY | Age: 73
Discharge: HOME | End: 2018-05-16
Payer: MEDICARE

## 2018-05-16 PROCEDURE — 97112 NEUROMUSCULAR REEDUCATION: CPT

## 2018-05-16 PROCEDURE — 97110 THERAPEUTIC EXERCISES: CPT

## 2018-05-16 PROCEDURE — 97140 MANUAL THERAPY 1/> REGIONS: CPT

## 2018-05-16 NOTE — PROGRESS NOTES
PHYSICAL THERAPY - DAILY TREATMENT NOTE    Patient Name: Champ Khan        Date: 2018  : 1945   YES Patient  Verified  Visit #:   4   of     Insurance: Payor: Josias Fraction / Plan: [place] MEDICARE PART A & B / Product Type: Medicare /      In time: 8:37 am Out time: 9:20am   Total Treatment Time: 43     Medicare Time Tracking (below)   Total Timed Codes (min):  43 1:1 Treatment Time:  43     TREATMENT AREA =  Difficulty in walking [R26.2]  Benign positional vertigo [H81.10]  Neck pain [M54.2]    SUBJECTIVE  Pain Level (on 0 to 10 scale):  3-4  / 10   Medication Changes/New allergies or changes in medical history, any new surgeries or procedures? NO    If yes, update Summary List   Subjective Functional Status/Changes:  []  No changes reported     Patient States \"i had a prett good week except last Wednesday after PT I had a rough day\"         OBJECTIVE  Modalities Rationale:    PD     21 min Therapeutic Exercise:  [x]  See flow sheet   Rationale:      increase ROM and increase strength to improve the patients ability to perform functional ADLs      14 min Neuromuscular Re-ed: Static and dynamic balance as per flow sheet   Rationale:    improve balance to improve the patients ability to ambulate on uneven terrain and in narrow corridors. 8 min Manual Therapy: Sit STM to TY UT, Lev, C/S paraspinals    Rationale:      decrease pain, increase ROM, increase tissue extensibility and decrease trigger points to improve patient's ability to perform functional ADLs     min Patient Education:  YES  Reviewed HEP   []  Progressed/Changed HEP based on: Other Objective/Functional Measures:    Pre Test:  Location of sx: L UT sx   Flexion = 9  Extension = 30  R/L Side Bend = 19/22  R/L Rot = 50/40     Post Treatment Pain Level (on 0 to 10) scale:   0  / 10     ASSESSMENT  Assessment/Changes in Function:     Sitting C/S retraction increased L sided C/S pain.  Improved tolerance for C/S retraction in semireclined. Decreases in L C/S sx with rep c/s ret in semireclined. Progressed sit EC to standing EO against wall, challenge with standing with arms at side EO.      []  See Progress Note/Recertification   Patient will continue to benefit from skilled PT services to modify and progress therapeutic interventions, address functional mobility deficits, address ROM deficits, address strength deficits, analyze and address soft tissue restrictions, analyze and cue movement patterns, analyze and modify body mechanics/ergonomics, assess and modify postural abnormalities, address imbalance/dizziness and instruct in home and community integration to attain remaining goals. Progress toward goals / Updated goals:    Slow progress toward all goals.      PLAN  [x]  Upgrade activities as tolerated YES Continue plan of care   []  Discharge due to :    []  Other:      Therapist: Negra Hooks    Date: 5/16/2018 Time: 7:49 AM     Future Appointments  Date Time Provider Arielle Guerrero   5/16/2018 8:30 AM Yadira [address] [place] [address]   5/23/2018 10:30 AM Negra Hooks [facility]   5/30/2018 10:30 AM Fady Omalley PTA [facility]   6/6/2018 10:30 AM Tika Crawley [facility]   6/19/2018 9:00 AM Laura Muñoz MD [address] [facility]

## 2018-05-23 ENCOUNTER — HOSPITAL ENCOUNTER (OUTPATIENT)
Dept: PHYSICAL THERAPY | Age: 73
Discharge: HOME | End: 2018-05-23
Payer: MEDICARE

## 2018-05-23 PROCEDURE — 97140 MANUAL THERAPY 1/> REGIONS: CPT

## 2018-05-23 PROCEDURE — 97112 NEUROMUSCULAR REEDUCATION: CPT

## 2018-05-23 PROCEDURE — G8978 MOBILITY CURRENT STATUS: HCPCS

## 2018-05-23 PROCEDURE — G8979 MOBILITY GOAL STATUS: HCPCS

## 2018-05-23 PROCEDURE — 97116 GAIT TRAINING THERAPY: CPT

## 2018-05-23 NOTE — PROGRESS NOTES
[address] [facility] THERAPY  [address] -   Phone: [phone]  Fax: [phone]  [x]  PROGRESS NOTE  []   [facility] SUMMARY  Patient Name: Osvaldo Villatoro : [birth year]   Treatment Diagnosis: Difficulty in walking [R26.2]  Benign positional vertigo [H81.10]  Neck pain [M54.2]     Referral Source: Homer Glover MD     Date of Initial Visit: 2018 Attended Visits: 5 Missed Visits: 0     SUMMARY OF TREATMENT  Therapeutic exercises including ROM, strengthening, vestibular adaptation card exercises, balance training, gait training, fall prevention strategies, and HEP instruction. CURRENT STATUS  The pt has progressed well with therapy, consistently reporting decreased pain and increased functional ability. Currently, the patient's main complaint is dizziness and imbalance. Average cervical pain is rated at  4/10 and 7-8/10 at the worst. C/S ROM is as follows: flexion 35 deg, extension 35 deg, R/L Side bending 26/30, and R/L Rotation 54/44. Patient continues to demonstrate impaired use of vestibular input and hip and ankle balance strategies. Patient scored 9/24 on DGI indicating increased risk of falls with ambulation. Pt would benefit from continued PT services in order to decrease dizziness, improve static/dynamic balance, gait training, increase C/S AROM, Cervical strength, flexibility, functional mobility and address remaining impairments. Goal/Measure of Progress Goal Met? 1.  Establish HEP to prevent further disability. Status at last Eval: Established Current Status: I with HEP yes   2. Patient will be able to maintain wide stance arms crossed 30 seconds eyes open to increase safety with standing in narrow spaces. Status at last Eval: Goal Established Current Status: Wide stance UEs crossed EO = 2 sec no   3.    Patient will be able to maintain wide stance arms at side for 20 seconds eyes closed to increase safety with showering. Status at last Eval: Goal Established Current Status: Wide stance UEs at side = 2 sec no   4. Patient to improve score on DGI to 8/24 indicating decreased fall risk with community ambulation. Status at last Eval: DGI = 5/24 Current Status: DGI = 9/24 yes     New Goals to be achieved in __3-4__  Weeks:  1. Patient to be independent with HEP and able to demo proper body mechanics for floor to chest lifting. 2.   Patient to improve score on FOTO to 72 indicating improved quality of life. 3.  Patient to improve score on DHI to 50/100 indicating decreased dizziness sx with functional mobility and improved QOL. 4.  Patient to improve score on DGI to 12/24 indicating decreased fall risk with ambulation. 5.  Improve C/S AROM to >/= 59% of normal to facilitate ADLs such as driving and knitting. 6.  Patient will be able to maintain wide stance arms at side 20 seconds eyes open to increase safety with standing in narrow spaces. 7.  Patient will be able to maintain wide stance arms at side for 10 seconds eyes closed to increase safety with showering. G-Codes (GP): Mobility [id number] Current  CL= 60-79%  [id number] Goal  CK= 40-59%. The severity rating is based on the Other DGI    RECOMMENDATIONS  Continue therapy with the following recommendations: 1-2x per week for 3-4 weeks    If you have any questions/comments please contact us directly at [phone]   Thank you for allowing us to assist in the care of your patient.     Therapist Signature: Kaveh Vieyra DPT Date: 5/23/2018     Time: 8:33 AM   NOTE TO PHYSICIAN:  PLEASE COMPLETE THE ORDERS BELOW AND FAX TO   [facility] Physical Therapy: [phone]  If you are unable to process this request in 24 hours please contact our office: [phone]    ___ I have read the above report and request that my patient continue as recommended.   ___ I have read the above report and request that my patient continue therapy with the following changes/special instructions:_________________________________________________________   ___ I have read the above report and request that my patient be discharged from therapy.      Physician Signature:        Date:       Time:

## 2018-05-23 NOTE — PROGRESS NOTES
PHYSICAL THERAPY - DAILY TREATMENT NOTE    Patient Name: Catrina Solis        Date: 2018  : 1945   YES Patient  Verified  Visit #:   5     Insurance: Payor: Kentonbecca Gonzalez / Plan: [place] & B / Product Type: Medicare /      In time: 10:26 Out time: 11:08   Total Treatment Time: 42     Medicare Time Tracking (below)   Total Timed Codes (min):  42 1:1 Treatment Time:  42     TREATMENT AREA =  Difficulty in walking [R26.2]  Benign positional vertigo [H81.10]  Neck pain [M54.2]    SUBJECTIVE  Pain Level (on 0 to 10 scale):  4-5  / 10   Medication Changes/New allergies or changes in medical history, any new surgeries or procedures? NO    If yes, update Summary List   Subjective Functional Status/Changes:  []  No changes reported     Patient states \"I do think therapy is helping and its making me more aware of what I should be doing, I still have the dizziness and unsteadiness\"        OBJECTIVE  Modalities Rationale:    PD     14 min Gait Training: See flow Sheet, Added DGI   Rationale:        20 min Neuromuscular Re-ed: Static and dynamic balance as per flow sheet   Rationale:    improve balance to improve the patients ability to ambulate on uneven terrain and in narrow corridors.      8 min Manual Therapy: Sit STM to TY UT, Lev, C/S paraspinals    Rationale:      decrease pain, increase ROM, increase tissue extensibility and decrease trigger points to improve patient's ability to perform functional ADLs      min Patient Education:  YES  Reviewed HEP   []  Progressed/Changed HEP based on:        Other Objective/Functional Measures:    See PN     Post Treatment Pain Level (on 0 to 10) scale:    10     ASSESSMENT  Assessment/Changes in Function:     See PN.     []  See Progress Note/Recertification   Patient will continue to benefit from skilled PT services to modify and progress therapeutic interventions, address functional mobility deficits, address ROM deficits, address strength deficits, analyze and address soft tissue restrictions, analyze and cue movement patterns, analyze and modify body mechanics/ergonomics, assess and modify postural abnormalities, address imbalance/dizziness and instruct in home and community integration to attain remaining goals. Progress toward goals / Updated goals:    See PN.       PLAN  []  Upgrade activities as tolerated YES Continue plan of care   []  Discharge due to :    []  Other:      Therapist: Nirmala Roberts    Date: 5/23/2018 Time: 10:30 AM     Future Appointments  Date Time Provider Arielle Guerrero   5/30/2018 10:30 AM Nakita Ag PTA [facility]   6/6/2018 10:30 AM Amshonna Gonzalez [facility]   6/19/2018 9:00 AM Marita Holloway MD [address] [facility] [address]

## 2018-05-30 ENCOUNTER — HOSPITAL ENCOUNTER (OUTPATIENT)
Dept: PHYSICAL THERAPY | Age: 73
Discharge: HOME | End: 2018-05-30
Payer: MEDICARE

## 2018-05-30 PROCEDURE — 97140 MANUAL THERAPY 1/> REGIONS: CPT

## 2018-05-30 PROCEDURE — 97112 NEUROMUSCULAR REEDUCATION: CPT

## 2018-06-06 ENCOUNTER — HOSPITAL ENCOUNTER (OUTPATIENT)
Dept: PHYSICAL THERAPY | Age: 73
Discharge: HOME | End: 2018-06-06
Payer: MEDICARE

## 2018-06-06 PROCEDURE — 97140 MANUAL THERAPY 1/> REGIONS: CPT

## 2018-06-06 PROCEDURE — 97112 NEUROMUSCULAR REEDUCATION: CPT

## 2018-06-06 NOTE — PROGRESS NOTES
PHYSICAL THERAPY - DAILY TREATMENT NOTE    Patient Name: Axel Beyer        Date: 2018  : 1945   YES Patient  Verified  Visit #:   (8) 6   of   -  Insurance: Payor: Lion Sanchez / Plan: [place] MEDICARE PART A & B / Product Type: Medicare /      In time: 10:34 am Out time: 11:15 am   Total Treatment Time: 41     Medicare Time Tracking (below)   Total Timed Codes (min):  41 1:1 Treatment Time:  31     TREATMENT AREA =  Difficulty in walking [R26.2]  Benign positional vertigo [H81.10]  Neck pain [M54.2]  SUBJECTIVE  Pain Level (on 0 to 10 scale):  3  / 10   Medication Changes/New allergies or changes in medical history, any new surgeries or procedures? NO    If yes, update Summary List   Subjective Functional Status/Changes:  []  No changes reported     Pt states \"I did a lot of head exercises last time, and I felt nauseous afterwards, it  down a little bit and started back up again\"         OBJECTIVE  Modalities Rationale:   N/a    25 (bill 15)  min Neuro Re-education: See flow sheet   Rationale:       8 NB min Gait Training: See flow sheet, Added amb cones, Lateral, and bkw amb   Rationale:    Improve gait mechanics to walk independently and safely in the grocery store     8 min Manual Therapy: Sit STM to TY UT, Lev, C/S paraspinals    Rationale:      decrease pain, increase ROM, increase tissue extensibility and decrease trigger points to improve patient's ability to perform functional ADLs     min Patient Education:  YES  Reviewed HEP   []  Progressed/Changed HEP based on: Other Objective/Functional Measures: Added VSE and bkw, lateral, and cone ambulation   Post Treatment Pain Level (on 0 to 10) scale:   3  / 10     ASSESSMENT  Assessment/Changes in Function:     Discussed use of leon pillow in order to decrease neck sx and improve tolerance for sleeping. Demonstrated backwards gait with lateral ambulation, and required, CGA to Min A with PT hand on gait belt for safety.  Cont to demonstrated decreased tolerance for standing feet apart EO. Held C/S therex secondary to increased nausea s/p last tx visit. Added VSE and updated and issue HEP. []  See Progress Note/Recertification   Patient will continue to benefit from skilled PT services to modify and progress therapeutic interventions, address functional mobility deficits, address ROM deficits, address strength deficits, analyze and address soft tissue restrictions, analyze and cue movement patterns, analyze and modify body mechanics/ergonomics and assess and modify postural abnormalities to attain remaining goals. Progress toward goals / Updated goals:    SLOW progressing towards all LTGs.       PLAN  [x]  Upgrade activities as tolerated YES Continue plan of care   []  Discharge due to :    []  Other:      Therapist: Dora Arnold DPT     Date: 6/6/2018 Time: 7:41 AM     Future Appointments  Date Time Provider Arielle Guerrero   6/6/2018 10:30 AM [facility]   6/12/2018 4:00 PM [facility]   6/19/2018 9:00 AM Dustin Bone MD [address] [place] [address]

## 2018-06-12 ENCOUNTER — APPOINTMENT (OUTPATIENT)
Dept: PHYSICAL THERAPY | Age: 73
End: 2018-06-12
Payer: MEDICARE

## 2018-06-13 ENCOUNTER — APPOINTMENT (OUTPATIENT)
Dept: PHYSICAL THERAPY | Age: 73
End: 2018-06-13
Payer: MEDICARE

## 2018-06-15 ENCOUNTER — HOSPITAL ENCOUNTER (OUTPATIENT)
Dept: PHYSICAL THERAPY | Age: 73
Discharge: HOME | End: 2018-06-15
Payer: MEDICARE

## 2018-06-15 PROCEDURE — 97140 MANUAL THERAPY 1/> REGIONS: CPT

## 2018-06-15 PROCEDURE — 97112 NEUROMUSCULAR REEDUCATION: CPT

## 2018-06-15 NOTE — PROGRESS NOTES
PHYSICAL THERAPY - DAILY TREATMENT NOTE    Patient Name: Jasmyn Barron        Date: 6/15/2018  : 1945   YES Patient  Verified  Visit #:   (4) 3   of   4-8   Insurance: Payor: Irena Cornelius / Plan: [place] MEDICARE PART A & B / Product Type: Medicare /      In time: 3:33 pm Out time: 4:05 pm   Total Treatment Time: 32     Medicare Time Tracking (below)   Total Timed Codes (min):  32 1:1 Treatment Time:  32     TREATMENT AREA =  Difficulty in walking [R26.2]  Benign positional vertigo [H81.10]  Neck pain [M54.2]    SUBJECTIVE  Pain Level (on 0 to 10 scale):  2-3 / 10   Medication Changes/New allergies or changes in medical history, any new surgeries or procedures? NO    If yes, update Summary List   Subjective Functional Status/Changes:  []  No changes reported     Pt reports \"i had a dizzy spell in the car Tuesday. Vilma Ice my  was driving. \"  Its been a dizzy week. I'm having problems still in the morning. Cortisone shot in ankle yesterday. Went to [employer] afterwards. Its bothering me today. I'm trying to do the exercise. I didn't do much of the focusing after Tuesday. OBJECTIVE  Modalities Rationale:   N/a       4 min Therapeutic Exercise:  [x]  See flow sheet   Rationale:      increase ROM and increase strength to improve the patients ability to perform functional ADLs     9 min Manual Therapy: Sitting STMDTM to Bilateral UT, Lev and cervical paraspinals   Rationale:      decrease pain, increase ROM, increase tissue extensibility and decrease trigger points to improve patient's ability to improve tissue mobility in ADLs      19 min Neuromuscular Re-ed: Static and dynamic balance   Rationale:    improve balance and increase proprioception to improve the patients ability to decrease fall risk       min Patient Education:  YES  Reviewed HEP   []  Progressed/Changed HEP based on: Other Objective/Functional Measures:     Add UT stretch   Post Treatment Pain Level (on 0 to 10) scale:  2 / 10     ASSESSMENT  Assessment/Changes in Function: reviewed neutral spine positioning in sitting and HEP. Emphasized consistent performance of HEP as tolerated. Pt with mild-moderate LOB during retro walking with 1 UE assist from counter and retro ambulation with CGA for recovery. TrPt tenderness persists L> R UT. Reviewed self massage techniques     []  See Progress Note/Recertification   Patient will continue to benefit from skilled PT services to address imbalance/dizziness and instruct in home and community integration to attain remaining goals. Progress toward goals / Updated goals:  1. Patient to be independent with HEP and able to demo proper body mechanics for floor to chest lifting. 2.   Patient to improve score on FOTO to 72 indicating improved quality of life. 3.  Patient to improve score on DHI to 50/100 indicating decreased dizziness sx with functional mobility and improved QOL. 4.  Patient to improve score on DGI to 12/24 indicating decreased fall risk with ambulation. 5.  Improve C/S AROM to >/= 59% of normal to facilitate ADLs such as driving and knitting. 6.  Patient will be able to maintain wide stance arms at side 20 seconds eyes open to increase safety with standing in narrow spaces.       7.   Patient will be able to maintain wide stance arms at side for 10 seconds eyes closed to increase safety with showering.            PLAN  []  Upgrade activities as tolerated YES Continue plan of care   []  Discharge due to :    []  Other:      Therapist: Jadyn Godfrey PTA    Date: 6/15/2018 Time: 4:05  PM     Future Appointments  Date Time Provider Arielle Guerrero   6/19/2018 9:00 AM Chayo Reyes MD [address] [facility]   6/20/2018 10:00 AM Jadyn Godfrey PTA [facility]

## 2018-06-17 RX ORDER — SIMVASTATIN 40 MG/1
TABLET, FILM COATED ORAL
Qty: 90 TAB | Refills: 3 | Status: SHIPPED | OUTPATIENT
Start: 2018-06-17 | End: 2018-06-19 | Stop reason: SDUPTHER

## 2018-06-19 ENCOUNTER — HOSPITAL ENCOUNTER (OUTPATIENT)
Dept: LAB | Age: 73
Discharge: HOME | End: 2018-06-19
Payer: MEDICARE

## 2018-06-19 ENCOUNTER — OFFICE VISIT (OUTPATIENT)
Dept: INTERNAL MEDICINE CLINIC | Age: 73
End: 2018-06-19

## 2018-06-19 VITALS
HEART RATE: 58 BPM | RESPIRATION RATE: 18 BRPM | SYSTOLIC BLOOD PRESSURE: 133 MMHG | HEIGHT: 64 IN | WEIGHT: 188 LBS | BODY MASS INDEX: 32.1 KG/M2 | DIASTOLIC BLOOD PRESSURE: 80 MMHG | OXYGEN SATURATION: 100 % | TEMPERATURE: 96.4 F

## 2018-06-19 DIAGNOSIS — E78.00 HYPERCHOLESTEROLEMIA: Chronic | ICD-10-CM

## 2018-06-19 DIAGNOSIS — I11.9 HYPERTENSIVE HEART DISEASE WITHOUT HEART FAILURE: Chronic | ICD-10-CM

## 2018-06-19 DIAGNOSIS — E55.9 VITAMIN D DEFICIENCY: Chronic | ICD-10-CM

## 2018-06-19 DIAGNOSIS — Z76.0 MEDICATION REFILL: ICD-10-CM

## 2018-06-19 DIAGNOSIS — E11.21 TYPE 2 DIABETES MELLITUS WITH NEPHROPATHY (HCC): Chronic | ICD-10-CM

## 2018-06-19 DIAGNOSIS — I11.9 HYPERTENSIVE HEART DISEASE WITHOUT HEART FAILURE: Primary | Chronic | ICD-10-CM

## 2018-06-19 PROBLEM — R05.9 COUGH: Status: RESOLVED | Noted: 2018-01-15 | Resolved: 2018-06-19

## 2018-06-19 PROBLEM — H66.002 ACUTE SUPPURATIVE OTITIS MEDIA OF LEFT EAR WITHOUT SPONTANEOUS RUPTURE OF TYMPANIC MEMBRANE: Status: RESOLVED | Noted: 2018-01-15 | Resolved: 2018-06-19

## 2018-06-19 PROBLEM — H73.891 ERYTHEMA OF TYMPANIC MEMBRANE OF RIGHT EAR: Status: RESOLVED | Noted: 2018-01-15 | Resolved: 2018-06-19

## 2018-06-19 LAB
ANION GAP SERPL CALC-SCNC: 8 MMOL/L (ref 3–18)
BUN SERPL-MCNC: 19 MG/DL (ref 7–18)
BUN/CREAT SERPL: 16 (ref 12–20)
CALCIUM SERPL-MCNC: 8.9 MG/DL (ref 8.5–10.1)
CHLORIDE SERPL-SCNC: 109 MMOL/L (ref 100–108)
CHOLEST SERPL-MCNC: 169 MG/DL
CO2 SERPL-SCNC: 27 MMOL/L (ref 21–32)
CREAT SERPL-MCNC: 1.17 MG/DL (ref 0.6–1.3)
GLUCOSE SERPL-MCNC: 76 MG/DL (ref 74–99)
HBA1C MFR BLD: 6.7 % (ref 4.2–5.6)
HDLC SERPL-MCNC: 45 MG/DL (ref 40–60)
HDLC SERPL: 3.8 {RATIO} (ref 0–5)
LDLC SERPL CALC-MCNC: 83.4 MG/DL (ref 0–100)
LIPID PROFILE,FLP: ABNORMAL
POTASSIUM SERPL-SCNC: 4.1 MMOL/L (ref 3.5–5.5)
SODIUM SERPL-SCNC: 144 MMOL/L (ref 136–145)
TRIGL SERPL-MCNC: 203 MG/DL (ref ?–150)
VLDLC SERPL CALC-MCNC: 40.6 MG/DL

## 2018-06-19 PROCEDURE — 83036 HEMOGLOBIN GLYCOSYLATED A1C: CPT | Performed by: INTERNAL MEDICINE

## 2018-06-19 PROCEDURE — 80048 BASIC METABOLIC PNL TOTAL CA: CPT | Performed by: INTERNAL MEDICINE

## 2018-06-19 PROCEDURE — 82652 VIT D 1 25-DIHYDROXY: CPT | Performed by: INTERNAL MEDICINE

## 2018-06-19 PROCEDURE — 36415 COLL VENOUS BLD VENIPUNCTURE: CPT | Performed by: INTERNAL MEDICINE

## 2018-06-19 PROCEDURE — 80061 LIPID PANEL: CPT | Performed by: INTERNAL MEDICINE

## 2018-06-19 RX ORDER — METOPROLOL TARTRATE 50 MG/1
TABLET ORAL
Qty: 90 TAB | Refills: 3 | Status: SHIPPED | OUTPATIENT
Start: 2018-06-19 | End: 2019-08-29 | Stop reason: SDUPTHER

## 2018-06-19 RX ORDER — SIMVASTATIN 40 MG/1
40 TABLET, FILM COATED ORAL
Qty: 90 TAB | Refills: 3 | Status: SHIPPED | OUTPATIENT
Start: 2018-06-19 | End: 2019-08-30 | Stop reason: SDUPTHER

## 2018-06-19 RX ORDER — SPIRONOLACTONE 50 MG/1
50 TABLET, FILM COATED ORAL DAILY
Qty: 90 TAB | Refills: 3 | Status: SHIPPED | OUTPATIENT
Start: 2018-06-19 | End: 2018-11-08 | Stop reason: SDUPTHER

## 2018-06-19 RX ORDER — ERGOCALCIFEROL 1.25 MG/1
50000 CAPSULE ORAL
Qty: 12 CAP | Refills: 3 | Status: SHIPPED | OUTPATIENT
Start: 2018-06-19 | End: 2021-02-04 | Stop reason: SDUPTHER

## 2018-06-19 RX ORDER — LEVOTHYROXINE SODIUM 100 UG/1
100 TABLET ORAL DAILY
Qty: 90 TAB | Refills: 3 | Status: SHIPPED | OUTPATIENT
Start: 2018-06-19 | End: 2019-03-19 | Stop reason: SDUPTHER

## 2018-06-19 NOTE — MR AVS SNAPSHOT
[address] [facility] 
 
 
 [address] [facility] [address] [address] 
[phone] Patient: Sabina Alcala MRN: [id number] Visit Information Date & Time Provider Department Dept. Phone Encounter #  
 6/19/2018  9:00 AM Go Patel MD [employer] [phone] [id number] Follow-up Instructions Return in about 5 months (around 11/3/2018) for Medicare Wellness Visit, HTN, DM, cholesterol. Upcoming Health Maintenance Date Due Influenza Age 5 to Adult 8/1/2018 HEMOGLOBIN A1C Q6M 8/20/2018 FOOT EXAM Q1 9/18/2018 MEDICARE YEARLY EXAM 11/14/2018 EYE EXAM RETINAL OR DILATED Q1 12/7/2018 MICROALBUMIN Q1 2/20/2019 LIPID PANEL Q1 2/20/2019 BREAST CANCER SCRN MAMMOGRAM 3/30/2019 GLAUCOMA SCREENING Q2Y 12/7/2019 COLONOSCOPY 7/12/2023 DTaP/Tdap/Td series (2 - Td) 5/19/2025 Allergies as of 6/19/2018  Review Complete On: 6/19/2018 By: Go Patel MD  
  
 Severity Noted Reaction Type Reactions Amoxicillin  12/18/2014    Rash Ampicillin  03/04/2015    Rash Codeine  11/15/2011   Side Effect Nausea and Vomiting Tussionex  01/15/2014   Side Effect Other (comments) Hallucinations Valium [Diazepam]  11/15/2011   Systemic Vertigo Current Immunizations  Reviewed on 6/18/2018 Name Date Influenza High Dose Vaccine PF 9/6/2017 Influenza Vaccine 11/1/2015, 9/30/2013 10:40 AM  
 Pneumococcal Conjugate (PCV-13) 11/1/2016 11:59 AM  
 Pneumococcal Polysaccharide (PPSV-23) 1/27/2014  9:20 AM  
 Tdap 5/19/2015 Not reviewed this visit You Were Diagnosed With   
  
 Codes Comments Hypertensive heart disease without heart failure    -  Primary ICD-10-CM: I11.9 ICD-9-CM: 402.90 Type 2 diabetes mellitus with nephropathy (HCC)     ICD-10-CM: E11.21 
ICD-9-CM: 250.40, 583.81 Hypercholesterolemia     ICD-10-CM: E78.00 ICD-9-CM: 272.0 Vitamin D deficiency     ICD-10-CM: E55.9 ICD-9-CM: 268.9 Medication refill     ICD-10-CM: Z76.0 ICD-9-CM: V68.1 Vitals BP Pulse Temp Resp Height(growth percentile) Weight(growth percentile) 133/80 (BP 1 Location: Right arm, BP Patient Position: Sitting) (!) 58 96.4 °F (35.8 °C) (Oral) 18 5' 4\" (1.626 m) 188 lb (85.3 kg) SpO2 BMI OB Status Smoking Status 100% 32.27 kg/m2 Hysterectomy Never Smoker Vitals History BMI and BSA Data Body Mass Index Body Surface Area  
 32.27 kg/m 2 1.96 m 2 Preferred Pharmacy Pharmacy Name Phone [facility] [facility] [phone] Your Updated Medication List  
  
   
This list is accurate as of 6/19/18  9:45 AM.  Always use your most recent med list.  
  
  
  
  
 albuterol 90 mcg/actuation inhaler Commonly known as:  PROVENTIL HFA, VENTOLIN HFA, PROAIR HFA Take 2 Puffs by inhalation every six (6) hours as needed for Wheezing. Indications: BRONCHOSPASM PREVENTION  
  
 BABY ASPIRIN PO Take 81 mg by mouth. diclofenac 1 % Gel Commonly known as:  VOLTAREN  
APPLY QUARTER SIZED AMOUNT TO AFFECTED AREA(S) 3 TIMES DAILY  
  
 ergocalciferol 50,000 unit capsule Commonly known as:  ERGOCALCIFEROL Take 1 Cap by mouth every seven (7) days. Indications: VITAMIN D DEFICIENCY (HIGH DOSE THERAPY) guaiFENesin  mg ER tablet Commonly known as:  Rey & Rey Take 1 Tab by mouth two (2) times a day. Indications: COLD SYMPTOMS, Cough  
  
 hydrocortisone 2.5 % topical cream  
Commonly known as:  HYTONE  
APPLY TO THE AFFECTED AREAS 2 TO 3 TIMES DAILY  
  
 loratadine 10 mg tablet Commonly known as:  Ivory Buddle Take 10 mg by mouth.  
  
 metoprolol tartrate 50 mg tablet Commonly known as:  LOPRESSOR Take one tablet by mouth one time daily  Indications: hypertension  
  
 montelukast 10 mg tablet Commonly known as:  SINGULAIR  
 TAKE 1 TABLET BY MOUTH NIGHTLY AT BEDTIME  
  
 scopolamine 1 mg over 3 days Pt3d Commonly known as:  TRANSDERM-SCOP  
APPLY 1 PATCH BEHIND RIGHT EAR EVERY 72 HOURS AS NEEDED FOR DIZZINESS  
  
 simvastatin 40 mg tablet Commonly known as:  ZOCOR Take 1 Tab by mouth nightly. Indications: hypercholesterolemia  
  
 spironolactone 50 mg tablet Commonly known as:  ALDACTONE Take 1 Tab by mouth daily. Indications: hypertension SYNTHROID 100 mcg tablet Generic drug:  levothyroxine Take 1 Tab by mouth daily. Indications: hypothyroidism  
  
 tiZANidine 2 mg tablet Commonly known as:  Samuel Rebel Take 1 Tab by mouth every six (6) hours as needed. Prescriptions Printed Refills SYNTHROID 100 mcg tablet 3 Sig: Take 1 Tab by mouth daily. Indications: hypothyroidism Class: Print Route: Oral  
  
Prescriptions Sent to Pharmacy Refills  
 simvastatin (ZOCOR) 40 mg tablet 3 Sig: Take 1 Tab by mouth nightly. Indications: hypercholesterolemia Class: Normal  
 Pharmacy: [address] #: [phone] Route: Oral  
 spironolactone (ALDACTONE) 50 mg tablet 3 Sig: Take 1 Tab by mouth daily. Indications: hypertension Class: Normal  
 Pharmacy: [address] #: [phone] Route: Oral  
 metoprolol tartrate (LOPRESSOR) 50 mg tablet 3 Sig: Take one tablet by mouth one time daily  Indications: hypertension Class: Normal  
 Pharmacy: [address] #: [phone]  
 ergocalciferol (ERGOCALCIFEROL) 50,000 unit capsule 3 Sig: Take 1 Cap by mouth every seven (7) days. Indications: VITAMIN D DEFICIENCY (HIGH DOSE THERAPY) Class: Normal  
 Pharmacy: [address] #: [phone] Route: Oral  
  
Follow-up Instructions Return in about 5 months (around 11/3/2018) for Medicare Wellness Visit, HTN, DM, cholesterol. To-Do List   
 06/19/2018 Lab:  HEMOGLOBIN A1C W/O EAG   
  
 06/19/2018 Lab:  LIPID PANEL   
  
 06/19/2018 Lab:  METABOLIC PANEL, BASIC   
  
 06/19/2018 Lab:  VITAMIN D, 1, 25 DIHYDROXY   
  
 06/20/2018 10:00 AM  
  Appointment with Osvaldo Nichole PTA at [facility] [address] [facility] [place], [facility] ([phone]) Introducing [facility]! Dear Batsheva Conteh: Thank you for requesting a [employer] account. Our records indicate that you already have an active [employer] account. You can access your account anytime at [email or web address] Did you know that you can access your hospital and ER discharge instructions at any time in [employer]? You can also review all of your test results from your hospital stay or ER visit. Additional Information If you have questions, please visit the Frequently Asked Questions section of the [employer] website at [email or web address] Remember, [employer] is NOT to be used for urgent needs. For medical emergencies, dial 911. Now available from your iPhone and Android! Please provide this summary of care documentation to your next provider. Your primary care clinician is listed as [facility]. If you have any questions after today's visit, please call [phone].

## 2018-06-19 NOTE — PROGRESS NOTES
HISTORY OF PRESENT ILLNESS  Eddie Delgado is a 67 y.o. female. Visit Vitals    /80 (BP 1 Location: Right arm, BP Patient Position: Sitting)    Pulse (!) 58    Temp 96.4 °F (35.8 °C) (Oral)    Resp 18    Ht 5' 4\" (1.626 m)    Wt 188 lb (85.3 kg)    SpO2 100%    BMI 32.27 kg/m2       HPI Comments: Sever water damage at home. With Mold. She may have to move out  Her  just started dialysis. She is feeling overwhelmed right now. Hypertension    The history is provided by the patient. This is a chronic problem. The current episode started more than 1 week ago. The problem has not changed since onset. There are no associated agents to hypertension. Risk factors include postmenopause and hypertension. Diabetes   The history is provided by the patient. This is a chronic problem. The current episode started more than 1 week ago. The problem occurs daily. Exacerbated by: diet. The symptoms are relieved by medications (diet). Treatments tried: see med list. The treatment provided moderate relief. Thyroid Problem   The history is provided by the patient. This is a chronic problem. The current episode started more than 1 week ago. The problem occurs daily. The problem has not changed since onset. The symptoms are relieved by medications. Review of Systems   Constitutional: Negative. Respiratory: Negative. Cardiovascular: Negative. Genitourinary: Negative for frequency. Neurological: Negative. Endo/Heme/Allergies: Negative for polydipsia. Physical Exam   Constitutional: She is oriented to person, place, and time. She appears well-developed and well-nourished. No distress. Cardiovascular: Normal rate and regular rhythm. Pulmonary/Chest: Effort normal and breath sounds normal.   Musculoskeletal: She exhibits no edema. Neurological: She is alert and oriented to person, place, and time. Skin: Skin is warm and dry. She is not diaphoretic.    Psychiatric: She has a normal mood and affect. Nursing note and vitals reviewed. ASSESSMENT and PLAN    ICD-10-CM ICD-9-CM    1. Hypertensive heart disease without heart failure Z66.4 312.60 METABOLIC PANEL, BASIC      LIPID PANEL   2. Type 2 diabetes mellitus with nephropathy (HCC) A21.35 189.43 METABOLIC PANEL, BASIC     583.81 HEMOGLOBIN A1C W/O EAG   3. Hypercholesterolemia E78.00 272.0 LIPID PANEL   4. Vitamin D deficiency E55.9 268.9 VITAMIN D, 1, 25 DIHYDROXY   5. Medication refill Z76.0 V68.1 simvastatin (ZOCOR) 40 mg tablet      SYNTHROID 100 mcg tablet      spironolactone (ALDACTONE) 50 mg tablet      metoprolol tartrate (LOPRESSOR) 50 mg tablet      ergocalciferol (ERGOCALCIFEROL) 50,000 unit capsule       BP controlled    Under a lot of stress. Feeling overwhelmed as well. Still working--but on call so her routine changes quickly. Works for [employer]    Update lab    Refills as noted    Discussed BMI/weight, lifestyle, diet and exercise. Discussed effect on blood pressure, blood sugar, and joints especially  Focus on limiting white carbs, portion control, and moving more.     F/u 4 months for MWV, HTN, DM,

## 2018-06-19 NOTE — PROGRESS NOTES
ROOM # 4  Pt is having a lot going on at home. Pt has mold infestation in home and having to move things. Pt is stressed and feels pulled in all directions. Chayo Hdz presents today for   Chief Complaint   Patient presents with    Hypertension    Diabetes    Cholesterol Problem    Thyroid Problem       Chayo Hdz preferred language for health care discussion is english/other. Is someone accompanying this pt? no    Is the patient using any DME equipment during OV? no    Depression Screening:  PHQ over the last two weeks 12/27/2017 12/5/2017 11/13/2017 11/13/2017 7/13/2017 3/11/2017 3/1/2017   Little interest or pleasure in doing things Not at all Not at all More than half the days Not at all Not at all Not at all Not at all   Feeling down, depressed or hopeless Not at all Not at all More than half the days Not at all Not at all Not at all Not at all   Total Score PHQ 2 0 0 4 0 0 0 0       Learning Assessment:  Learning Assessment 5/19/2015   PRIMARY LEARNER Patient   HIGHEST LEVEL OF EDUCATION - PRIMARY LEARNER  > 4 YEARS [address] CAREGIVER No   PRIMARY LANGUAGE ENGLISH   LEARNER PREFERENCE PRIMARY READING     DEMONSTRATION   ANSWERED BY Patient   RELATIONSHIP SELF       Abuse Screening:  Abuse Screening Questionnaire 11/13/2017 5/19/2015   Do you ever feel afraid of your partner? N N   Are you in a relationship with someone who physically or mentally threatens you? N N   Is it safe for you to go home? Y Y       Fall Risk  Fall Risk Assessment, last 12 mths 6/19/2018 12/27/2017 12/5/2017 11/13/2017 7/13/2017 3/11/2017 3/1/2017   Able to walk? Yes Yes Yes Yes Yes Yes Yes   Fall in past 12 months? No No No No No No No       Health Maintenance reviewed and discussed per provider. Yes    Chayo Hdz is due for There are no preventive care reminders to display for this patient. Please order/place referral if appropriate. Advance Directive:  1.  Do you have an advance directive in place? Patient Reply: no    2. If not, would you like material regarding how to put one in place? Patient Reply: no    Coordination of Care:  1. Have you been to the ER, urgent care clinic since your last visit? Hospitalized since your last visit? no    2. Have you seen or consulted any other health care providers outside of the [facility] since your last visit? Include any pap smears or colon screening.  no

## 2018-06-20 ENCOUNTER — HOSPITAL ENCOUNTER (OUTPATIENT)
Dept: PHYSICAL THERAPY | Age: 73
Discharge: HOME | End: 2018-06-20
Payer: MEDICARE

## 2018-06-20 LAB — 1,25(OH)2D3 SERPL-MCNC: 109 PG/ML (ref 19.9–79.3)

## 2018-06-20 PROCEDURE — 97110 THERAPEUTIC EXERCISES: CPT

## 2018-06-20 PROCEDURE — 97112 NEUROMUSCULAR REEDUCATION: CPT

## 2018-06-20 PROCEDURE — 97140 MANUAL THERAPY 1/> REGIONS: CPT

## 2018-06-20 NOTE — PROGRESS NOTES
PHYSICAL THERAPY - DAILY TREATMENT NOTE    Patient Name: Catrina Solis        Date: 2018  : 1945   YES Patient  Verified  Visit #:     Insurance: Payor: Kunal Gonzalez / Plan: [place] MED[place] PART A & B / Product Type: Medicare /      In time: 10:03 am Out time: 10:42 am   Total Treatment Time: 39     Medicare Time Tracking (below)   Total Timed Codes (min):  39 1:1 Treatment Time:  39     TREATMENT AREA =  Difficulty in walking [R26.2]  Benign positional vertigo [H81.10]  Neck pain [M54.2]    SUBJECTIVE  Pain Level (on 0 to 10 scale): 4  / 10   Medication Changes/New allergies or changes in medical history, any new surgeries or procedures? NO    If yes, update Summary List   Subjective Functional Status/Changes:  []  No changes reported     Today is an achy day. I feel nausated and my head feels like it wants to spin. Its not. I just feel sort of light headed. HEP: scap retraction and sitting with EC. I've been working     OBJECTIVE  Modalities Rationale:n/a      9 min Therapeutic Exercise:  [x]  See flow sheet   Rationale:      increase ROM and increase strength to improve the patients ability to perform functional ADLs     9 min Manual Therapy: Sitting STMDTM to Bilateral UT, Lev and cervical paraspinals; instruction in use of thera cane for self Trigger point release   Rationale:      decrease pain, increase ROM, correct positional vertigo and increase postural awareness to improve patient's ability to decrease dizziness in ADLs      21 min Neuromuscular Re-ed: Static and dynamic balance, sit VSE   Rationale:    increase ROM, improve balance and increase proprioception to improve the patients ability to decrease fall risk     min Patient Education:  YES  Reviewed HEP   []  Progressed/Changed HEP based on: Other Objective/Functional Measures:    Advanced dynamic gait for gait with HHT, VHT, march and stepping strategy over 2x4.  Resume sit VSE  Add shoulder rolls, sit shoulder extension ROM     Post Treatment Pain Level (on 0 to 10) scale:   4  / 10     ASSESSMENT  Assessment/Changes in Function:   Pt reporting no exercise in dizziness or nausea symptoms from baseline during treatment. Pt demonstrating improving static standing balance near wall without support x 30 seconds. Standing EC static balance continues to be a challenge for > 5-8 seconds without support. Review and updated HEP. VSE decreased to 30 seconds versus 1 min secondary to pt reporting increased neck pain with HHT VSE with improved tolerance. Pt education in use of thera cane for self trigger point release to UT , Brooks Luann and Rhomboid regions. Discussed discharge planning. []  See Progress Note/Recertification   Patient will continue to benefit from skilled PT services to modify and progress therapeutic interventions, address functional mobility deficits, address ROM deficits, address imbalance/dizziness and instruct in home and community integration to attain remaining goals.    Progress toward goals / Updated goals:    LTG #6: Patient will be able to maintain wide stance arms at side 20 seconds eyes open to increase safety with standing in narrow spaces.-met goal 6-20-18         PLAN  []  Upgrade activities as tolerated YES Continue plan of care   []  Discharge due to :    []  Other:      Therapist: Blanca Saavedra PTA    Date: 6/20/2018 Time: 10:42 AM     Future Appointments  Date Time Provider Arielle Guerrero   11/5/2018 10:00 AM Barrie Heimlich, MD [address] [facility] [address]

## 2018-06-25 NOTE — PROGRESS NOTES
[facility] PHYSICAL THERAPY  [address] [facility] [address] - Phone: [phone]  Fax: [phone] SUMMARY FOR PHYSICAL THERAPY          Patient Name: Janine Bryant :    Treatment/Medical Diagnosis: Difficulty in walking [R26.2]  Benign positional vertigo [H81.10]  Neck pain [M54.2]   Onset Date: 2017    Referral Source: Beverly Champion MD [facility]): 2018   Prior Hospitalization: See Medical History Provider #:    Prior Level of Function: Chronic dizziness    Comorbidities: Arthritis (Lumbar Spine), DM, Hypothyroidism, HTN, CKD Stage 3, Neck pain   Medications: Verified on Patient Summary List   Visits from [facility]: 10 Missed Visits: 1     Goal/Measure of Progress Goal Met? 1. Patient will be able to maintain wide stance arms at side 20 seconds eyes open to increase safety with standing in narrow spaces. Status at last Eval: Wide stance UEs crossed EO = 2 sec Current Status: Wide stance UEs at side EO = 12 no   2. Patient will be able to maintain wide stance arms at side for 10 seconds eyes closed to increase safety with showering.     Status at last Eval: Wide stance UEs at side = 2 sec Current Status: Wide stance UEs at side = 2 sec no   3. Improve C/S AROM to >/= 50% of normal to facilitate ADLs such as driving and knitting. Status at last Eval: C/S Flex = 20 deg  C/S Ext = 32 deg  C/S R/L SB = 18/25 deg Current Status: C/S Flex = 25 deg  C/S Ext = 27 deg  C/S R/L SB = 22/24 deg  C/S R/L Rot = 55/50 Partially Met   4. Patient to improve score on DGI to  indicating decreased fall risk with ambulation. Status at last Eval: DGI =  Current Status: DGI = 10/24 Progressing    5. Patient to improve score on DHI to 50/100 indicating decreased dizziness sx with functional mobility and improved QOL. [address] [place] [address] Street =  68/100  Current Status: [address] [place] [address] Street = 48/100  Yes    6.  Patient to improve score on FOTO to 72 indicating improved quality of life. FOTO = 60 Current Status: FOTO = 63 No     Key Functional Changes/Progress: The pt has progressed slowly but well with therapy. Pt reports 60% improvement in dizziness and balance sx since initiating PT services. Patient continues to report increases in dizziness with reading, forward bending, and rolling in bed. Based on lateau in progress with participation in PT services patient is appropriate for DC to home mgt of sx at this time. Recommended strongly compliance with HEP upon discharge from PT services. G-Codes (GP): Mobility  [id number] Goal  CL= 60-79%  D/C  CK= 40-59%. The severity rating is based on the FOTO Score    Assessments/Recommendations: Discontinue therapy due to lack of appreciable progress towards goals. If you have any questions/comments please contact us directly at  [phone]. Thank you for allowing us to assist in the care of your patient.     Therapist Signature: Negra Hooks Date: 6/26/218   Reporting Period: 4/15/2018 to 6/26/2018 Time: 4:01 PM

## 2018-06-26 ENCOUNTER — HOSPITAL ENCOUNTER (OUTPATIENT)
Dept: PHYSICAL THERAPY | Age: 73
Discharge: HOME | End: 2018-06-26
Payer: MEDICARE

## 2018-06-26 PROCEDURE — G8980 MOBILITY D/C STATUS: HCPCS

## 2018-06-26 PROCEDURE — 97110 THERAPEUTIC EXERCISES: CPT

## 2018-06-26 PROCEDURE — 97116 GAIT TRAINING THERAPY: CPT

## 2018-06-26 PROCEDURE — 97112 NEUROMUSCULAR REEDUCATION: CPT

## 2018-06-26 PROCEDURE — G8979 MOBILITY GOAL STATUS: HCPCS

## 2018-06-26 NOTE — PROGRESS NOTES
PHYSICAL THERAPY - DAILY TREATMENT NOTE    Patient Name: Chayo Hdz        Date: 2018  : 1945   YES Patient  Verified  Visit #:   10   of   12  Insurance: Payor: Heidi Gallardo / Plan: [place] MEDICARE PART A & B / Product Type: Medicare /      In time: 8:03 Out time: 8:43   Total Treatment Time: 40     Medicare Time Tracking (below)   Total Timed Codes (min):  40 1:1 Treatment Time:  40     TREATMENT AREA =  Difficulty in walking [R26.2]  Benign positional vertigo [H81.10]  Neck pain [M54.2]  SUBJECTIVE  Pain Level (on 0 to 10 scale):  4  / 10   Medication Changes/New allergies or changes in medical history, any new surgeries or procedures? NO    If yes, update Summary List   Subjective Functional Status/Changes:  []  No changes reported     Pt states \"Im dizzy I did too much reading last night\"         OBJECTIVE  Modalities Rationale:   N/a    15 min Neuro Re-education: Education in vestibular taper and balance training HEP   Rationale:       15 min Therapeutic Exercise:  [x]  See flow sheet   Rationale:      increase ROM and increase strength to improve the patients ability to perform household chores    10 min Gait Training: DGI Assessment    Rationale:    Improve gait mechanics to walk independently and safely in the grocery store       min Patient Education:  YES  Reviewed HEP   []  Progressed/Changed HEP based on:         Other Objective/Functional Measures:     See DC   Post Treatment Pain Level (on 0 to 10) scale:   4  / 10     ASSESSMENT  Assessment/Changes in Function:     See DC  FOTO = 63   []  See Progress Note/Recertification   Patient will continue to benefit from skilled PT services to modify and progress therapeutic interventions, address functional mobility deficits, address ROM deficits, address strength deficits, analyze and address soft tissue restrictions, analyze and cue movement patterns, analyze and modify body mechanics/ergonomics and assess and modify postural abnormalities to attain remaining goals.    Progress toward goals / Updated goals:    See DC     PLAN  [x]  Upgrade activities as tolerated YES Continue plan of care   []  Discharge due to :    []  Other:      Therapist: Milton Martinez DPT     Date: 6/26/2018 Time: 7:31 AM     Future Appointments  Date Time Provider Arielle Guerrero   6/26/2018 8:00 AM [address]   11/5/2018 10:00 AM Kaya Leos MD [address] [facility]

## 2018-11-07 ENCOUNTER — OFFICE VISIT (OUTPATIENT)
Dept: INTERNAL MEDICINE CLINIC | Age: 73
End: 2018-11-07

## 2018-11-07 VITALS
OXYGEN SATURATION: 97 % | SYSTOLIC BLOOD PRESSURE: 138 MMHG | DIASTOLIC BLOOD PRESSURE: 84 MMHG | TEMPERATURE: 98.4 F | RESPIRATION RATE: 14 BRPM | WEIGHT: 189 LBS | HEART RATE: 62 BPM | HEIGHT: 64 IN | BODY MASS INDEX: 32.27 KG/M2

## 2018-11-07 DIAGNOSIS — R25.1 TREMOR OF BOTH HANDS: ICD-10-CM

## 2018-11-07 DIAGNOSIS — E11.21 TYPE 2 DIABETES MELLITUS WITH NEPHROPATHY (HCC): Chronic | ICD-10-CM

## 2018-11-07 DIAGNOSIS — I11.9 HYPERTENSIVE HEART DISEASE WITHOUT HEART FAILURE: Chronic | ICD-10-CM

## 2018-11-07 DIAGNOSIS — Z00.00 MEDICARE ANNUAL WELLNESS VISIT, SUBSEQUENT: Primary | ICD-10-CM

## 2018-11-07 LAB
GLUCOSE POC: 126 MG/DL
HBA1C MFR BLD HPLC: 6.1 %

## 2018-11-07 NOTE — PATIENT INSTRUCTIONS
Medicare Wellness Visit, Female     The best way to live healthy is to have a lifestyle where you eat a well-balanced diet, exercise regularly, limit alcohol use, and quit all forms of tobacco/nicotine, if applicable. Regular preventive services are another way to keep healthy. Preventive services (vaccines, screening tests, monitoring & exams) can help personalize your care plan, which helps you manage your own care. Screening tests can find health problems at the earliest stages, when they are easiest to treat. [facility] follows the current, evidence-based guidelines published by the [facility] ([facility]) when recommending preventive services for our patients. Because we follow these guidelines, sometimes recommendations change over time as research supports it. (For example, mammograms used to be recommended annually. Even though Medicare will still pay for an annual mammogram, the newer guidelines recommend a mammogram every two years for women of average risk.)  Of course, you and your doctor may decide to screen more often for some diseases, based on your risk and your health status. Preventive services for you include:  - Medicare offers their members a free annual wellness visit, which is time for you and your primary care provider to discuss and plan for your preventive service needs. Take advantage of this benefit every year!  -All adults over the age of 72 should receive the recommended pneumonia vaccines. Current USPSTF guidelines recommend a series of two vaccines for the best pneumonia protection.   -All adults should have a flu vaccine yearly and a tetanus vaccine every 10 years. All adults age 61 and older should receive a shingles vaccine once in their lifetime.    -A bone mass density test is recommended when a woman turns 65 to screen for osteoporosis. This test is only recommended one time, as a screening.  Some providers will use this same test as a disease monitoring tool if you already have osteoporosis. -All adults age 38-68 who are overweight should have a diabetes screening test once every three years.   -Other screening tests and preventive services for persons with diabetes include: an eye exam to screen for diabetic retinopathy, a kidney function test, a foot exam, and stricter control over your cholesterol.   -Cardiovascular screening for adults with routine risk involves an electrocardiogram (ECG) at intervals determined by your doctor.   -Colorectal cancer screenings should be done for adults age 54-65 with no increased risk factors for colorectal cancer. There are a number of acceptable methods of screening for this type of cancer. Each test has its own benefits and drawbacks. Discuss with your doctor what is most appropriate for you during your annual wellness visit. The different tests include: colonoscopy (considered the best screening method), a fecal occult blood test, a fecal DNA test, and sigmoidoscopy. -Breast cancer screenings are recommended every other year for women of normal risk, age 54-69.  -Cervical cancer screenings for women over age 72 are only recommended with certain risk factors.   -All adults born between [facility] should be screened once for Hepatitis C. Here is a list of your current Health Maintenance items (your personalized list of preventive services) with a due date:  Health Maintenance Due   Topic Date Due    Shingles Vaccine (1 of 2) 09/15/1994    Flu Vaccine  08/01/2018    Diabetic Foot Care  09/18/2018    Eye Exam  12/07/2018            Benign Essential Tremor: Care Instructions  Your Care Instructions    Benign essential tremor is a medical term for shaking that you can't control. Your hand or fingers may shake when you lift a cup or point at something. Or your voice may shake when you speak. This type of tremor is not harmful. It is not caused by a stroke or Parkinson's disease.   Some things can affect how much you shake. For example, drinking or eating something with caffeine may make tremors worse for a while. Some medicines also can increase tremors. These include antidepressants and too much thyroid replacement. Talk to your doctor if you think one of your medicines makes your tremors worse. If you are self-conscious about your tremors, there are some things you can do to reduce them or make them less noticeable. This includes taking medicine. Follow-up care is a key part of your treatment and safety. Be sure to make and go to all appointments, and call your doctor if you are having problems. It's also a good idea to know your test results and keep a list of the medicines you take. How can you care for yourself at home? · Take your medicines exactly as prescribed. Call your doctor if you think you are having a problem with your medicine. Some medicines that help control tremors have to be taken every day, even if you are not having tremors. You will get more details on the specific medicines your doctor prescribes. · Get plenty of rest.  · Eat a balanced, healthy diet. · Try to reduce stress. Regular exercise and massages may help. · Limit alcohol. Heavy drinking can make your tremors worse. · Avoid drinks or foods with caffeine if they make your tremors worse. These include tea, cola, coffee, and chocolate. · Wear a heavy bracelet or watch. This adds a little weight to your hand. The extra weight may reduce tremors. · Drink from cups or glasses that are only half full. You may also want to try drinking with a straw. When should you call for help? Watch closely for changes in your health, and be sure to contact your doctor if:    · You notice your tremors are getting worse.     · You can't do your everyday activities because of your tremors.     · You are sad and embarrassed about your shaking. Where can you learn more? Go to http://mane-isabella.info/.   Enter B797 in the search box to learn more about \"Benign Essential Tremor: Care Instructions. \"  Current as of: June 4, 2018  Content Version: 11.8  © 4422-9760 Healthwise, Incorporated. Care instructions adapted under license by [employer] (which disclaims liability or warranty for this information). If you have questions about a medical condition or this instruction, always ask your healthcare professional. [place] [address] any warranty or liability for your use of this information.

## 2018-11-07 NOTE — PROGRESS NOTES
ROOM #     Blaine Goel presents today for Medicare Wellness Visit    Blaine Goel preferred language for health care discussion is english/other. Is someone accompanying this pt? NO    Is the patient using any DME equipment during OV? NO    Depression Screening:  PHQ over the last two weeks 11/7/2018 12/27/2017 12/5/2017 11/13/2017 11/13/2017 7/13/2017 3/11/2017   Little interest or pleasure in doing things Not at all Not at all Not at all More than half the days Not at all Not at all Not at all   Feeling down, depressed, irritable, or hopeless Not at all Not at all Not at all More than half the days Not at all Not at all Not at all   Total Score PHQ 2 0 0 0 4 0 0 0   Trouble falling or staying asleep, or sleeping too much Not at all - - - - - -   Feeling tired or having little energy Several days - - - - - -   Poor appetite, weight loss, or overeating Not at all - - - - - -   Feeling bad about yourself - or that you are a failure or have let yourself or your family down Several days - - - - - -   Trouble concentrating on things such as school, work, reading, or watching TV Not at all - - - - - -   Moving or speaking so slowly that other people could have noticed; or the opposite being so fidgety that others notice Nearly every day - - - - - -   Thoughts of being better off dead, or hurting yourself in some way Not at all - - - - - -   PHQ 9 Score 5 - - - - - -       Learning Assessment:  Learning Assessment 5/19/2015   PRIMARY LEARNER Patient   HIGHEST LEVEL OF EDUCATION - PRIMARY LEARNER  > 4 YEARS OF COLLEGE   BARRIERS PRIMARY LEARNER NONE   CO-LEARNER CAREGIVER No   PRIMARY LANGUAGE ENGLISH   LEARNER PREFERENCE PRIMARY READING     DEMONSTRATION   ANSWERED BY Patient   RELATIONSHIP SELF       Abuse Screening:  Abuse Screening Questionnaire 11/13/2017 5/19/2015   Do you ever feel afraid of your partner? N N   Are you in a relationship with someone who physically or mentally threatens you?  N N   Is it safe for you to go home? Y Y       Fall Risk  Fall Risk Assessment, last 12 mths 11/7/2018 6/19/2018 12/27/2017 12/5/2017 11/13/2017 7/13/2017 3/11/2017   Able to walk? Yes Yes Yes Yes Yes Yes Yes   Fall in past 12 months? No No No No No No No       Health Maintenance reviewed and discussed per provider. Yes    Bhaskar Mitchell is due for   Health Maintenance Due   Topic Date Due    Shingrix Vaccine Age 49> (1 of 2) 09/15/1994    Influenza Age 5 to Adult  08/01/2018    FOOT EXAM Q1  09/18/2018    EYE EXAM RETINAL OR DILATED Q1  12/07/2018         Please order/place referral if appropriate. Advance Directive:  1. Do you have an advance directive in place? Patient Reply:NO    2. If not, would you like material regarding how to put one in place? Patient Reply: NO    Coordination of Care:  1. Have you been to the ER, urgent care clinic since your last visit? Hospitalized since your last visit? NO    2. Have you seen or consulted any other health care providers outside of the [facility] since your last visit? Include any pap smears or colon screening.  NO

## 2018-11-07 NOTE — PROGRESS NOTES
This is the Subsequent Medicare Annual Wellness Exam, performed 12 months or more after the Initial AWV or the last Subsequent AWV    I have reviewed the patient's medical history in detail and updated the computerized patient record. History     Past Medical History:   Diagnosis Date    BPPV (benign paroxysmal positional vertigo) 01/12/2018    had ENG at Dr. Chito Garcia office    CRI (chronic renal insufficiency)     stage 3    Diabetes mellitus, type 2 ([address] 2/2016    by HBA1c    Disease of inner ear     GERD (gastroesophageal reflux disease) 12/2/2011    Hypercholesterolemia     Hypertension     Hypothyroidism 12/2/2011      Past Surgical History:   Procedure Laterality Date    BIOPSY OF BREAST, INCISIONAL  1995    left    HX BREAST REDUCTION      bilateral    HX CARPAL TUNNEL RELEASE      bilateral     HX COLONOSCOPY  7/12/13    no f/u, Dr. Demar Haley      right tympanicplastty    HX HYSTERECTOMY      partial    HX ORTHOPAEDIC      right ulna    HX ORTHOPAEDIC      bilateral trigger finger releases     Current Outpatient Medications   Medication Sig Dispense Refill    simvastatin (ZOCOR) 40 mg tablet Take 1 Tab by mouth nightly. Indications: hypercholesterolemia 90 Tab 3    SYNTHROID 100 mcg tablet Take 1 Tab by mouth daily. Indications: hypothyroidism 90 Tab 3    spironolactone (ALDACTONE) 50 mg tablet Take 1 Tab by mouth daily. Indications: hypertension 90 Tab 3    metoprolol tartrate (LOPRESSOR) 50 mg tablet Take one tablet by mouth one time daily  Indications: hypertension 90 Tab 3    ergocalciferol (ERGOCALCIFEROL) 50,000 unit capsule Take 1 Cap by mouth every seven (7) days. Indications: VITAMIN D DEFICIENCY (HIGH DOSE THERAPY) 12 Cap 3    diclofenac (VOLTAREN) 1 % gel APPLY QUARTER SIZED AMOUNT TO AFFECTED AREA(S) 3 TIMES DAILY  3    loratadine (CLARITIN) 10 mg tablet Take 10 mg by mouth.  BABY ASPIRIN PO Take 81 mg by mouth.        Allergies   Allergen Reactions  Amoxicillin Rash    Ampicillin Rash    Codeine Nausea and Vomiting    Tussionex Other (comments)     Hallucinations    Valium [Diazepam] Vertigo     Family History   Problem Relation Age of Onset    Heart Disease Mother     Diabetes Mother         dialysis    Alcohol abuse Father     Heart defect Father     Alcohol abuse Sister     Heart defect Sister     Diabetes Maternal Aunt     Diabetes Maternal Uncle     Diabetes Maternal Grandmother     Stroke Maternal Grandmother     Diabetes Maternal Aunt     Diabetes Maternal Uncle     Diabetes Daughter     Cancer Daughter     Hypertension Daughter     Migraines Son      Social History     Tobacco Use    Smoking status: Never Smoker    Smokeless tobacco: Never Used   Substance Use Topics    Alcohol use: No     Patient Active Problem List   Diagnosis Code    Post concussion syndrome F07.81    HCVD (hypertensive cardiovascular disease) I11.9    GERD (gastroesophageal reflux disease) K21.9    Hypothyroidism E03.9    Hypercholesterolemia E78.00    Type 2 diabetes mellitus with nephropathy (HCC) E11.21    Vertigo R42    Vitamin D deficiency E55.9       Depression Risk Factor Screening:     PHQ over the last two weeks 11/7/2018   Little interest or pleasure in doing things Not at all   Feeling down, depressed, irritable, or hopeless Not at all   Total Score PHQ 2 0   Trouble falling or staying asleep, or sleeping too much Not at all   Feeling tired or having little energy Several days   Poor appetite, weight loss, or overeating Not at all   Feeling bad about yourself - or that you are a failure or have let yourself or your family down Several days   Trouble concentrating on things such as school, work, reading, or watching TV Not at all   Moving or speaking so slowly that other people could have noticed; or the opposite being so fidgety that others notice Nearly every day   Thoughts of being better off dead, or hurting yourself in some way Not at all   PHQ 9 Score 5     Alcohol Risk Factor Screening: You do not drink alcohol or very rarely. Functional Ability and Level of Safety:   Hearing Loss  Hearing is good. Activities of Daily Living  The home contains: no safety equipment. Patient does total self care    Fall Risk  Fall Risk Assessment, last 12 mths 11/7/2018   Able to walk? Yes   Fall in past 12 months? No       Abuse Screen  Patient is not abused    Cognitive Screening   Evaluation of Cognitive Function:  Has your family/caregiver stated any concerns about your memory: no  Normal, Mini Cog test    Patient Care Team   Patient Care Team:  Heena Gonzalez MD as PCP - General (Internal Medicine)  Sean Serna DPM (Podiatry)  Jasmeet Bolton MD (Otolaryngology)  Haider Middleton MD as Consulting Provider (Ophthalmology)  Gracie Carrero MD as Consulting Provider (Nephrology)    Assessment/Plan   Education and counseling provided:  Are appropriate based on today's review and evaluation    Diagnoses and all orders for this visit:    1.  Medicare annual wellness visit, subsequent      Health Maintenance Due   Topic Date Due    Shingrix Vaccine Age 49> (1 of 2) 09/15/1994    Influenza Age 5 to Adult  08/01/2018    FOOT EXAM Q1  09/18/2018    EYE EXAM RETINAL OR DILATED Q1  12/07/2018

## 2018-11-07 NOTE — PROGRESS NOTES
HISTORY OF PRESENT ILLNESS  Rajinder Kelly is a 75 y.o. female. Visit Vitals  /84 (BP 1 Location: Right arm, BP Patient Position: Sitting)   Pulse 62   Temp 98.4 °F (36.9 °C)   Resp 14   Ht 5' 4\" (1.626 m)   Wt 189 lb (85.7 kg)   SpO2 97%   BMI 32.44 kg/m²       Hypertension    The history is provided by the patient. This is a chronic problem. The current episode started more than 1 week ago. The problem has not changed since onset. There are no associated agents to hypertension. Risk factors include postmenopause and a sedentary lifestyle. Diabetes   The history is provided by the patient. This is a chronic problem. The current episode started more than 1 week ago. The problem occurs daily. Exacerbated by: diet. Relieved by: diet. Tremors   The history is provided by the patient. This is a new problem. The current episode started more than 1 week ago. The problem occurs every several days. The problem has not changed since onset. Exacerbated by: movement such as picking up an object. She has tried nothing for the symptoms. Review of Systems   Constitutional: Negative. Respiratory: Negative. Cardiovascular: Negative. Neurological: Positive for tremors (head sometimes, and left hand. --Intention tremor. ). Physical Exam   Constitutional: She is oriented to person, place, and time. She appears well-developed and well-nourished. No distress. Cardiovascular: Normal rate and regular rhythm. Pulmonary/Chest: Effort normal and breath sounds normal.   Musculoskeletal: She exhibits no edema. Neurological: She is alert and oriented to person, place, and time. Intention tremor noted in both hands with finger to nose testing  No cogwheeling noted. Skin: Skin is warm and dry. She is not diaphoretic. Psychiatric: She has a normal mood and affect. Nursing note and vitals reviewed.       ASSESSMENT and PLAN    ICD-10-CM ICD-9-CM           2. Type 2 diabetes mellitus with nephropathy ([facility] [address] E11.21 250.40 AMB POC GLUCOSE BLOOD, BY GLUCOSE MONITORING DEVICE     583.81 AMB POC HEMOGLOBIN A1C   3. Hypertensive heart disease without heart failure I11.9 402.90    4. Tremor of both hands R25.1 781.0        BP controlled  Update HBA1c    Reviewed lab provided by pt  Due for lipid and thyroid but will forego today due to time and last several have been excellent    I suspect she has benign essential tremor. Patient educational information provided. Will monitor for now.  Pt to keep record of occurrences and what seems to aggravate it    F/u 2 months for BP check and tremor check

## 2018-11-08 DIAGNOSIS — Z76.0 MEDICATION REFILL: ICD-10-CM

## 2018-11-08 RX ORDER — SPIRONOLACTONE 50 MG/1
50 TABLET, FILM COATED ORAL DAILY
Qty: 90 TAB | Refills: 3 | Status: SHIPPED | OUTPATIENT
Start: 2018-11-08 | End: 2019-10-07 | Stop reason: SDUPTHER

## 2018-11-08 NOTE — TELEPHONE ENCOUNTER
Requested Prescriptions     Pending Prescriptions Disp Refills    spironolactone (ALDACTONE) 50 mg tablet 90 Tab 3     Sig: Take 1 Tab by mouth daily.      90 day supply request

## 2019-01-29 ENCOUNTER — OFFICE VISIT (OUTPATIENT)
Dept: INTERNAL MEDICINE CLINIC | Age: 74
End: 2019-01-29

## 2019-01-29 VITALS
WEIGHT: 185.4 LBS | BODY MASS INDEX: 31.65 KG/M2 | RESPIRATION RATE: 12 BRPM | OXYGEN SATURATION: 98 % | HEART RATE: 68 BPM | TEMPERATURE: 96 F | HEIGHT: 64 IN | DIASTOLIC BLOOD PRESSURE: 70 MMHG | SYSTOLIC BLOOD PRESSURE: 125 MMHG

## 2019-01-29 DIAGNOSIS — G25.0 BENIGN ESSENTIAL TREMOR: ICD-10-CM

## 2019-01-29 DIAGNOSIS — M16.12 PRIMARY OSTEOARTHRITIS OF LEFT HIP: ICD-10-CM

## 2019-01-29 DIAGNOSIS — I11.9 HYPERTENSIVE HEART DISEASE WITHOUT HEART FAILURE: Primary | ICD-10-CM

## 2019-01-29 RX ORDER — KETOCONAZOLE 20 MG/G
CREAM TOPICAL
Refills: 4 | COMMUNITY
Start: 2019-01-16

## 2019-01-29 RX ORDER — HYDROCORTISONE 25 MG/G
OINTMENT TOPICAL
Refills: 2 | COMMUNITY
Start: 2019-01-16

## 2019-01-29 RX ORDER — AMMONIUM LACTATE 12 G/100G
LOTION TOPICAL
COMMUNITY
Start: 2018-11-16

## 2019-01-29 RX ORDER — LORATADINE 10 MG/1
10 TABLET ORAL DAILY
Qty: 90 TAB | Refills: 4 | Status: SHIPPED | OUTPATIENT
Start: 2019-01-29

## 2019-01-29 NOTE — PROGRESS NOTES
Jasmyn Barron is a 75 y.o. female (: 1945) presenting to address:    Chief Complaint   Patient presents with    Hypertension    Tremors       Vitals:    19 1142   BP: 125/70   Pulse: 68   Resp: 12   Temp: 96 °F (35.6 °C)   TempSrc: Oral   SpO2: 98%   Weight: 185 lb 6.4 oz (84.1 kg)   Height: 5' 4\" (1.626 m)   PainSc:   0 - No pain       Hearing/Vision:   No exam data present    Learning Assessment:     Learning Assessment 2015   PRIMARY LEARNER Patient   HIGHEST LEVEL OF EDUCATION - PRIMARY LEARNER  > 4 YEARS OF COLLEGE   BARRIERS PRIMARY LEARNER NONE   CO-LEARNER CAREGIVER No   PRIMARY LANGUAGE ENGLISH   LEARNER PREFERENCE PRIMARY READING     DEMONSTRATION   ANSWERED BY Patient   RELATIONSHIP SELF     Depression Screening:     PHQ over the last two weeks 2019   Little interest or pleasure in doing things Not at all   Feeling down, depressed, irritable, or hopeless Not at all   Total Score PHQ 2 0   Trouble falling or staying asleep, or sleeping too much -   Feeling tired or having little energy -   Poor appetite, weight loss, or overeating -   Feeling bad about yourself - or that you are a failure or have let yourself or your family down -   Trouble concentrating on things such as school, work, reading, or watching TV -   Moving or speaking so slowly that other people could have noticed; or the opposite being so fidgety that others notice -   Thoughts of being better off dead, or hurting yourself in some way -   PHQ 9 Score -     Fall Risk Assessment:     Fall Risk Assessment, last 12 mths 2019   Able to walk? Yes   Fall in past 12 months? No     Abuse Screening:     Abuse Screening Questionnaire 2017   Do you ever feel afraid of your partner? N   Are you in a relationship with someone who physically or mentally threatens you? N   Is it safe for you to go home? Y     Coordination of Care Questionaire:   1. Have you been to the ER, urgent care clinic since your last visit? Hospitalized since your last visit? NO    2. Have you seen or consulted any other health care providers outside of the [address] [place] [address] since your last visit? Include any pap smears or colon screening. NO    Advanced Directive:   1. Do you have an Advanced Directive? NO    2. Would you like information on Advanced Directives?  NO

## 2019-01-29 NOTE — PROGRESS NOTES
HISTORY OF PRESENT ILLNESS  Laura Raphael is a 75 y.o. female. Visit Vitals  /70   Pulse 68   Temp 96 °F (35.6 °C) (Oral)   Resp 12   Ht 5' 4\" (1.626 m)   Wt 185 lb 6.4 oz (84.1 kg)   SpO2 98%   BMI 31.82 kg/m²       Her house reconstruction was done but she still has a lot of boxes to go through. Lots of things she needs to clean out and give away. So still a bit of stress. Hypertension    The history is provided by the patient. This is a chronic problem. The current episode started more than 1 week ago. There are no associated agents to hypertension. Risk factors include a sedentary lifestyle, postmenopause and stress. Tremors   The history is provided by the patient. This is a chronic problem. The current episode started more than 1 week ago. The problem occurs daily. Exacerbated by: intention tremor. Writing is more noticeable. Hip Injury    The history is provided by the patient (left hip). This is a chronic problem. The current episode started more than 1 week ago. The problem occurs daily. The problem has been gradually worsening. Associated symptoms include stiffness. The symptoms are aggravated by standing and activity. Review of Systems   Musculoskeletal: Positive for stiffness. Neurological: Positive for tremors. Physical Exam   Constitutional: She is oriented to person, place, and time. She appears well-developed and well-nourished. No distress. Cardiovascular: Normal rate. Pulmonary/Chest: Effort normal.   Musculoskeletal:   Left hip pain with forced flexion. And with rotation alvin external rotation   Neurological: She is alert and oriented to person, place, and time. No hand tremor noted today  Slight facial tremor noted   Skin: Skin is warm and dry. She is not diaphoretic. Psychiatric: She has a normal mood and affect. Nursing note and vitals reviewed. ASSESSMENT and PLAN    ICD-10-CM ICD-9-CM    1.  Hypertensive heart disease without heart failure I11.9 402.90    2. Benign essential tremor G25.0 333.1    3. Primary osteoarthritis of left hip M16.12 715.15        Greater than 50% of appointment time was spent in counseling. Discussed benign tremor, cervical dystonia, and her hip arthritis and treatment options    Last lab was OK. Not due today    BP looks great    Tremor not noticeable today. May refer to neurology if pt wishes    Hip pain with know degeneration on Xray in 2016. Will refer to ortho when pt is ready. Discussed likely needing surgery. She can not take NSAIDs    Discussed BMI/weight, lifestyle, diet and exercise. Discussed effect on blood pressure, blood sugar, and joints especially  Focus on limiting white carbs, portion control, and moving more.     F/u 2-3 months for combo clinic

## 2019-04-03 LAB — HBA1C MFR BLD HPLC: 6.3 %

## 2019-04-08 ENCOUNTER — OFFICE VISIT (OUTPATIENT)
Dept: INTERNAL MEDICINE CLINIC | Age: 74
End: 2019-04-08

## 2019-04-08 ENCOUNTER — HOSPITAL ENCOUNTER (OUTPATIENT)
Dept: LAB | Age: 74
Discharge: HOME | End: 2019-04-08
Payer: MEDICARE

## 2019-04-08 VITALS
TEMPERATURE: 98.5 F | SYSTOLIC BLOOD PRESSURE: 123 MMHG | OXYGEN SATURATION: 98 % | RESPIRATION RATE: 16 BRPM | HEART RATE: 65 BPM | DIASTOLIC BLOOD PRESSURE: 78 MMHG | WEIGHT: 188.8 LBS | HEIGHT: 64 IN | BODY MASS INDEX: 32.23 KG/M2

## 2019-04-08 DIAGNOSIS — I11.9 HYPERTENSIVE HEART DISEASE, UNSPECIFIED WHETHER HEART FAILURE PRESENT: Chronic | ICD-10-CM

## 2019-04-08 DIAGNOSIS — R82.81 PYURIA: ICD-10-CM

## 2019-04-08 DIAGNOSIS — E03.9 ACQUIRED HYPOTHYROIDISM: Chronic | ICD-10-CM

## 2019-04-08 DIAGNOSIS — E11.21 TYPE 2 DIABETES MELLITUS WITH NEPHROPATHY (HCC): Chronic | ICD-10-CM

## 2019-04-08 DIAGNOSIS — E11.21 TYPE 2 DIABETES MELLITUS WITH NEPHROPATHY (HCC): Primary | Chronic | ICD-10-CM

## 2019-04-08 DIAGNOSIS — N39.46 MIXED STRESS AND URGE URINARY INCONTINENCE: ICD-10-CM

## 2019-04-08 DIAGNOSIS — E78.00 HYPERCHOLESTEROLEMIA: Chronic | ICD-10-CM

## 2019-04-08 LAB
APPEARANCE UR: CLEAR
BACTERIA URNS QL MICRO: NEGATIVE /HPF
BILIRUB UR QL: NEGATIVE
CHOLEST SERPL-MCNC: 153 MG/DL
COLOR UR: YELLOW
EPITH CASTS URNS QL MICRO: NORMAL /LPF (ref 0–5)
GLUCOSE UR STRIP.AUTO-MCNC: NEGATIVE MG/DL
HDLC SERPL-MCNC: 47 MG/DL (ref 40–60)
HDLC SERPL: 3.3 {RATIO} (ref 0–5)
HGB UR QL STRIP: NEGATIVE
KETONES UR QL STRIP.AUTO: NEGATIVE MG/DL
LDLC SERPL CALC-MCNC: 78.4 MG/DL (ref 0–100)
LEUKOCYTE ESTERASE UR QL STRIP.AUTO: ABNORMAL
LIPID PROFILE,FLP: NORMAL
NITRITE UR QL STRIP.AUTO: NEGATIVE
PH UR STRIP: 5.5 [PH] (ref 5–8)
PROT UR STRIP-MCNC: NEGATIVE MG/DL
RBC #/AREA URNS HPF: 0 /HPF (ref 0–5)
SP GR UR REFRACTOMETRY: 1.01 (ref 1–1.03)
T4 FREE SERPL-MCNC: 1.4 NG/DL (ref 0.7–1.5)
TRIGL SERPL-MCNC: 138 MG/DL (ref ?–150)
TSH SERPL DL<=0.05 MIU/L-ACNC: 1.4 UIU/ML (ref 0.36–3.74)
UROBILINOGEN UR QL STRIP.AUTO: 0.2 EU/DL (ref 0.2–1)
VLDLC SERPL CALC-MCNC: 27.6 MG/DL
WBC URNS QL MICRO: NORMAL /HPF (ref 0–4)

## 2019-04-08 PROCEDURE — 80061 LIPID PANEL: CPT

## 2019-04-08 PROCEDURE — 87086 URINE CULTURE/COLONY COUNT: CPT

## 2019-04-08 PROCEDURE — 81001 URINALYSIS AUTO W/SCOPE: CPT

## 2019-04-08 PROCEDURE — 84439 ASSAY OF FREE THYROXINE: CPT

## 2019-04-08 NOTE — PROGRESS NOTES
HISTORY OF PRESENT ILLNESS  Harley Guillory is a 75 y.o. female. Visit Vitals  /78   Pulse 65   Temp 98.5 °F (36.9 °C)   Resp 16   Ht 5' 4\" (1.626 m)   Wt 188 lb 12.8 oz (85.6 kg)   SpO2 98%   BMI 32.41 kg/m²       Also states her HBA1c is 6.3 at a health screening. They also discussed her bladder sxs. She has urinary incontinence and would like to see PT      Also in January she stumbled and fell. Walking up steps. Landed onto knees and outstretched left hand (hit the back of her hand)      Current Outpatient Medications:  SYNTHROID 100 mcg tablet, TAKE 1 TABLET BY MOUTH ONCE DAILY FOR HYPOTHYROIDISM  ketoconazole (NIZORAL) 2 % topical cream, APPLY A THIN LAYER TO AFFECTED AREA TWICE A DAY  hydrocortisone (HYTONE) 2.5 % ointment, APPLY TO AFFECTED AREAS DAILY AS DIRECTED WITH KETOCONAZOLE CREAM  ammonium lactate (LAC-HYDRIN) 12 % lotion, Use 1 Application to affected area Take As Needed. loratadine (CLARITIN) 10 mg tablet, Take 1 Tab by mouth daily. spironolactone (ALDACTONE) 50 mg tablet, Take 1 Tab by mouth daily. simvastatin (ZOCOR) 40 mg tablet, Take 1 Tab by mouth nightly. Indications: hypercholesterolemia  metoprolol tartrate (LOPRESSOR) 50 mg tablet, Take one tablet by mouth one time daily  Indications: hypertension  ergocalciferol (ERGOCALCIFEROL) 50,000 unit capsule, Take 1 Cap by mouth every seven (7) days. Indications: VITAMIN D DEFICIENCY (HIGH DOSE THERAPY) (Patient taking differently: Take 50,000 Units by mouth every month.)  diclofenac (VOLTAREN) 1 % gel, APPLY QUARTER SIZED AMOUNT TO AFFECTED AREA(S) 3 TIMES DAILY  BABY ASPIRIN PO, Take 81 mg by mouth. Hypertension    The history is provided by the patient. This is a chronic problem. The current episode started more than 1 week ago. The problem has not changed since onset. Pertinent negatives include no chest pain, no palpitations, no headaches, no dizziness and no shortness of breath.  Risk factors include postmenopause, obesity, hypertension and a sedentary lifestyle. Thyroid Problem   The history is provided by the patient. This is a chronic problem. The current episode started more than 1 week ago. The problem occurs daily. The problem has not changed since onset. Pertinent negatives include no chest pain, no headaches and no shortness of breath. Diabetes   The history is provided by the patient. This is a chronic problem. The current episode started more than 1 week ago. The problem occurs daily. The problem has not changed since onset. Pertinent negatives include no chest pain, no headaches and no shortness of breath. Exacerbated by: diet. Relieved by: diet. Treatments tried: med list.       Review of Systems   Constitutional: Negative. Respiratory: Negative for shortness of breath. Cardiovascular: Negative for chest pain and palpitations. Genitourinary: Negative for frequency and urgency. Neurological: Negative for dizziness, tingling and headaches. Endo/Heme/Allergies: Negative for polydipsia. Physical Exam   Constitutional: She is oriented to person, place, and time. She appears well-developed and well-nourished. No distress. Cardiovascular: Normal rate and regular rhythm. Pulmonary/Chest: Effort normal and breath sounds normal.   Musculoskeletal: She exhibits no edema. Neurological: She is alert and oriented to person, place, and time. Diabetic foot exam:     Left Foot:   Visual Exam: bunion, good reflex   Pulse DP: 2+ (normal)   Filament test: normal sensation    Vibratory sensation: diminished      Right Foot:   Visual Exam: bunion, overlapping toe, good reflex   Pulse DP: 2+ (normal)   Filament test: normal sensation    Vibratory sensation: diminished     Skin: Skin is warm and dry. She is not diaphoretic. Psychiatric: She has a normal mood and affect. Nursing note and vitals reviewed. ASSESSMENT and PLAN    ICD-10-CM ICD-9-CM    1.  Type 2 diabetes mellitus with nephropathy (HCC) E11.21 250.40 LIPID PANEL     583.81  DIABETES FOOT EXAM   2. Hypertensive heart disease, unspecified whether heart failure present I11.9 402.90 LIPID PANEL   3. Hypercholesterolemia E78.00 272.0 LIPID PANEL   4. Mixed stress and urge urinary incontinence N39.46 788.33 REFERRAL TO PHYSICAL THERAPY   5. Acquired hypothyroidism E03.9 244.9 TSH AND FREE T4   6. Pyuria N39.0 791.9 URINALYSIS W/ RFLX MICROSCOPIC      CULTURE, URINE       BP controlled    Discussed BMI/weight, lifestyle, diet and exercise. Discussed effect on blood pressure, blood sugar, and joints especially  Focus on limiting white carbs, portion control, and moving more.     DM controlled    Update missing lab--she provided us with much but not everything we needed    F/u 4 months for HTN, DM, chol, thyroid

## 2019-04-08 NOTE — PROGRESS NOTES
ROOM # 5    Cristiano Cortes presents today for   Chief Complaint   Patient presents with    Hypertension    Tremors       Cristiano Cortes preferred language for health care discussion is english/other. Is someone accompanying this pt? no    Is the patient using any DME equipment during OV? no    Depression Screening:  3 most recent [facility] Screens 1/29/2019 11/7/2018 12/27/2017 12/5/2017 11/13/2017 11/13/2017 7/13/2017   Little interest or pleasure in doing things Not at all Not at all Not at all Not at all More than half the days Not at all Not at all   Feeling down, depressed, irritable, or hopeless Not at all Not at all Not at all Not at all More than half the days Not at all Not at all   Total Score PHQ 2 0 0 0 0 4 0 0   Trouble falling or staying asleep, or sleeping too much - Not at all - - - - -   Feeling tired or having little energy - Several days - - - - -   Poor appetite, weight loss, or overeating - Not at all - - - - -   Feeling bad about yourself - or that you are a failure or have let yourself or your family down - Several days - - - - -   Trouble concentrating on things such as school, work, reading, or watching TV - Not at all - - - - -   Moving or speaking so slowly that other people could have noticed; or the opposite being so fidgety that others notice - Nearly every day - - - - -   Thoughts of being better off dead, or hurting yourself in some way - Not at all - - - - -   PHQ 9 Score - 5 - - - - -       Learning Assessment:  Learning Assessment 5/19/2015   PRIMARY LEARNER Patient   HIGHEST LEVEL OF EDUCATION - PRIMARY LEARNER  > 4 YEARS [address] LEARNER NONE   CO-LEARNER CAREGIVER No   PRIMARY LANGUAGE ENGLISH   LEARNER PREFERENCE PRIMARY READING     DEMONSTRATION   ANSWERED BY Patient   RELATIONSHIP SELF       Abuse Screening:  Abuse Screening Questionnaire 11/13/2017 5/19/2015   Do you ever feel afraid of your partner?  N N   Are you in a relationship with someone who physically or mentally threatens you? N N   Is it safe for you to go home? Y Y       Fall Risk  Fall Risk Assessment, last 12 mths 4/8/2019 1/29/2019 11/7/2018 6/19/2018 12/27/2017 12/5/2017 11/13/2017   Able to walk? Yes Yes Yes Yes Yes Yes Yes   Fall in past 12 months? Yes No No No No No No   Fall with injury? Yes - - - - - -   Number of falls in past 12 months 1 - - - - - -   Fall Risk Score 2 - - - - - -       Health Maintenance reviewed and discussed per provider. Yes    Yogi Rater is due for   Health Maintenance Due   Topic Date Due    Shingrix Vaccine Age 50> (1 of 2) 09/15/1994    FOOT EXAM Q1  09/18/2018    BREAST CANCER SCRN MAMMOGRAM  03/30/2019    HEMOGLOBIN A1C Q6M  05/07/2019     Please order/place referral if appropriate. Advance Directive:  1. Do you have an advance directive in place? Patient Reply: no    2. If not, would you like material regarding how to put one in place? Patient Reply: no    Coordination of Care:  1. Have you been to the ER, urgent care clinic since your last visit? Hospitalized since your last visit? no    2. Have you seen or consulted any other health care providers outside of the [address] [place] [address] since your last visit? Include any pap smears or colon screening.  Nephrologist

## 2019-04-10 LAB
BACTERIA SPEC CULT: NORMAL
SERVICE CMNT-IMP: NORMAL

## 2019-07-02 DIAGNOSIS — Z76.0 MEDICATION REFILL: ICD-10-CM

## 2019-07-02 RX ORDER — LEVOTHYROXINE SODIUM 100 UG/1
TABLET ORAL
Qty: 90 TAB | Refills: 0 | Status: SHIPPED | OUTPATIENT
Start: 2019-07-02 | End: 2019-09-26 | Stop reason: SDUPTHER

## 2019-07-02 NOTE — TELEPHONE ENCOUNTER
Requested Prescriptions     Pending Prescriptions Disp Refills    SYNTHROID 100 mcg tablet 90 Tab 0

## 2019-08-01 ENCOUNTER — OFFICE VISIT (OUTPATIENT)
Dept: INTERNAL MEDICINE CLINIC | Age: 74
End: 2019-08-01

## 2019-08-01 VITALS
DIASTOLIC BLOOD PRESSURE: 82 MMHG | WEIGHT: 189 LBS | RESPIRATION RATE: 20 BRPM | SYSTOLIC BLOOD PRESSURE: 143 MMHG | TEMPERATURE: 97.9 F | BODY MASS INDEX: 32.27 KG/M2 | HEART RATE: 66 BPM | HEIGHT: 64 IN | OXYGEN SATURATION: 98 %

## 2019-08-01 DIAGNOSIS — M62.838 MUSCLE SPASM OF BOTH LOWER LEGS: ICD-10-CM

## 2019-08-01 DIAGNOSIS — S70.11XA CONTUSION OF RIGHT THIGH, INITIAL ENCOUNTER: Primary | ICD-10-CM

## 2019-08-01 NOTE — PROGRESS NOTES
ROOM # 5    Richard Guzman presents today for   Chief Complaint   Patient presents with   [facility] Fall     this morning     Pain (Chronic)     right side        Richard Guzman preferred language for health care discussion is english/other. Is someone accompanying this pt? no    Is the patient using any DME equipment during OV? no    Depression Screening:  3 most recent [facility] Screens 1/29/2019 11/7/2018 12/27/2017 12/5/2017 11/13/2017 11/13/2017 7/13/2017   Little interest or pleasure in doing things Not at all Not at all Not at all Not at all More than half the days Not at all Not at all   Feeling down, depressed, irritable, or hopeless Not at all Not at all Not at all Not at all More than half the days Not at all Not at all   Total Score PHQ 2 0 0 0 0 4 0 0   Trouble falling or staying asleep, or sleeping too much - Not at all - - - - -   Feeling tired or having little energy - Several days - - - - -   Poor appetite, weight loss, or overeating - Not at all - - - - -   Feeling bad about yourself - or that you are a failure or have let yourself or your family down - Several days - - - - -   Trouble concentrating on things such as school, work, reading, or watching TV - Not at all - - - - -   Moving or speaking so slowly that other people could have noticed; or the opposite being so fidgety that others notice - Nearly every day - - - - -   Thoughts of being better off dead, or hurting yourself in some way - Not at all - - - - -   PHQ 9 Score - 5 - - - - -       Learning Assessment:  Learning Assessment 5/19/2015   PRIMARY LEARNER Patient   HIGHEST LEVEL OF EDUCATION - PRIMARY LEARNER  > 4 YEARS [address] LEARNER NONE   CO-LEARNER CAREGIVER No   PRIMARY LANGUAGE ENGLISH   LEARNER PREFERENCE PRIMARY READING     DEMONSTRATION   ANSWERED BY Patient   RELATIONSHIP SELF       Abuse Screening:  Abuse Screening Questionnaire 11/13/2017 5/19/2015   Do you ever feel afraid of your partner?  N N   Are you in a relationship with someone who physically or mentally threatens you? N N   Is it safe for you to go home? Y Y       Fall Risk  Fall Risk Assessment, last 12 mths 4/8/2019 1/29/2019 11/7/2018 6/19/2018 12/27/2017 12/5/2017 11/13/2017   Able to walk? Yes Yes Yes Yes Yes Yes Yes   Fall in past 12 months? Yes No No No No No No   Fall with injury? Yes - - - - - -   Number of falls in past 12 months 1 - - - - - -   Fall Risk Score 2 - - - - - -           Health Maintenance reviewed and discussed per provider. Yes    Hayden Cormier is due for   Health Maintenance Due   Topic Date Due    Shingrix Vaccine Age 49> (1 of 2) 09/15/1994    Influenza Age 5 to Adult  08/01/2019     Please order/place referral if appropriate. Advance Directive:  1. Do you have an advance directive in place? Patient Reply: no    2. If not, would you like material regarding how to put one in place? Patient Reply: no    Coordination of Care:  1. Have you been to the ER, urgent care clinic since your last visit? Hospitalized since your last visit? no    2. Have you seen or consulted any other health care providers outside of the [address] [place] [address] since your last visit? Include any pap smears or colon screening.  no

## 2019-08-01 NOTE — PROGRESS NOTES
HISTORY OF PRESENT ILLNESS  Carlos Nixon is a 75 y.o. female. Visit Vitals  /82   Pulse 66   Temp 97.9 °F (36.6 °C) (Oral)   Resp 20   Ht 5' 4\" (1.626 m)   Wt 189 lb (85.7 kg)   SpO2 98%   BMI 32.44 kg/m²       While on toilet her thighs began cramping. She went to stand up and felt hot and faint. She grabbed at the towel bar which came off the wall ans she landed on the floor on her right side. She feel sore. She has been walking but is sore    Did not strike head. Did not have any LOC. She has chronic intermittent vertigo  Has long standing gait impairment which has been evaluated in the past and she has has PT which did not help much. Fall   The history is provided by the patient. The accident occurred 1 to 2 hours ago. The fall occurred while standing. She fell from a height of 3 - 5 ft. She landed on hard floor. Point of impact: right thigh an buttocks. Pertinent negatives include no headaches. Review of Systems   Constitutional: Negative. Musculoskeletal:        Right thigh pain. Neurological: Negative for dizziness and headaches. Physical Exam   Constitutional: She is oriented to person, place, and time. She appears well-developed and well-nourished. No distress. Cardiovascular: Normal rate and regular rhythm. Pulmonary/Chest: Effort normal and breath sounds normal.   Musculoskeletal: She exhibits no edema. OK strength in both legs. Gait unsteady (this is her baseline). No swelling noted in her thigh. No bruising noted externally on hip, Buttock, or thigh   Neurological: She is alert and oriented to person, place, and time. Skin: Skin is warm and dry. She is not diaphoretic. Psychiatric: She has a normal mood and affect. Nursing note and vitals reviewed. ASSESSMENT and PLAN    ICD-10-CM ICD-9-CM    1. Contusion of right thigh, initial encounter S70.11XA 924.00    2. Muscle spasm of both lower legs M62.838 728.85        She can just rest tonight.  Ice/cold packs tonight. Can change to heat tomorrow if feels better. Take it easy at work  Tylenol OK. Topical NSAIDs OK    I suspect she had a vasovagal response to the leg cramps that started this    She will call with an update on Monday.     F/u prn or as appointed        (? Has she ever seen neurology?)

## 2019-08-29 DIAGNOSIS — Z76.0 MEDICATION REFILL: ICD-10-CM

## 2019-08-29 RX ORDER — METOPROLOL TARTRATE 50 MG/1
TABLET ORAL
Qty: 90 TAB | Refills: 3 | Status: SHIPPED | OUTPATIENT
Start: 2019-08-29 | End: 2019-08-30 | Stop reason: SDUPTHER

## 2019-08-30 DIAGNOSIS — Z76.0 MEDICATION REFILL: ICD-10-CM

## 2019-08-30 RX ORDER — SIMVASTATIN 40 MG/1
TABLET, FILM COATED ORAL
Qty: 90 TAB | Refills: 3 | Status: SHIPPED | OUTPATIENT
Start: 2019-08-30 | End: 2020-09-10

## 2019-08-30 RX ORDER — METOPROLOL TARTRATE 50 MG/1
TABLET ORAL
Qty: 90 TAB | Refills: 3 | Status: SHIPPED | OUTPATIENT
Start: 2019-08-30 | End: 2020-09-11

## 2019-09-20 ENCOUNTER — OFFICE VISIT (OUTPATIENT)
Dept: INTERNAL MEDICINE CLINIC | Age: 74
End: 2019-09-20

## 2019-09-20 VITALS
TEMPERATURE: 97.7 F | DIASTOLIC BLOOD PRESSURE: 84 MMHG | HEART RATE: 64 BPM | HEIGHT: 64 IN | WEIGHT: 193 LBS | BODY MASS INDEX: 32.95 KG/M2 | SYSTOLIC BLOOD PRESSURE: 130 MMHG | OXYGEN SATURATION: 98 % | RESPIRATION RATE: 20 BRPM

## 2019-09-20 DIAGNOSIS — E11.21 TYPE 2 DIABETES MELLITUS WITH NEPHROPATHY (HCC): ICD-10-CM

## 2019-09-20 DIAGNOSIS — R06.09 DYSPNEA ON EXERTION: Primary | ICD-10-CM

## 2019-09-20 DIAGNOSIS — E03.9 ACQUIRED HYPOTHYROIDISM: ICD-10-CM

## 2019-09-20 DIAGNOSIS — I11.9 HYPERTENSIVE HEART DISEASE, UNSPECIFIED WHETHER HEART FAILURE PRESENT: ICD-10-CM

## 2019-09-20 DIAGNOSIS — E78.00 HYPERCHOLESTEROLEMIA: ICD-10-CM

## 2019-09-20 RX ORDER — ALBUTEROL SULFATE 90 UG/1
1 AEROSOL, METERED RESPIRATORY (INHALATION)
Qty: 1 INHALER | Refills: 1 | Status: SHIPPED | OUTPATIENT
Start: 2019-09-20 | End: 2021-04-28 | Stop reason: SDUPTHER

## 2019-09-20 NOTE — PROGRESS NOTES
HISTORY OF PRESENT ILLNESS  Harvinder Driver is a 75 y.o. female. /84 (BP 1 Location: Left arm, BP Patient Position: Sitting)   Pulse 64   Temp 97.7 °F (36.5 °C) (Oral)   Resp 20   Ht 5' 4\" (1.626 m)   Wt 193 lb (87.5 kg)   SpO2 98%   BMI 33.13 kg/m²     This is the first week she has worked a 40 hr week and they want her to work overtime  There is a lot of stress at home right now. Breathing Problem   The history is provided by the patient ( about a week she has been having DURAN. ). This is a new problem. The problem occurs frequently. The current episode started more than 1 week ago. The problem has not changed since onset. Associated symptoms include rhinorrhea, cough and wheezing. Pertinent negatives include no fever, no headaches, no sore throat, no chest pain and no syncope. Review of Systems   Constitutional: Negative for chills and fever. HENT: Positive for rhinorrhea. Negative for sore throat. Respiratory: Positive for cough and wheezing. Gets a heavy sensation like she needs to stop and rest  .   Cardiovascular: Negative for chest pain, palpitations and syncope. Neurological: Negative for dizziness and headaches. Physical Exam   Constitutional: She is oriented to person, place, and time. She appears well-developed and well-nourished. No distress. Cardiovascular: Normal rate and regular rhythm. Pulmonary/Chest: Effort normal and breath sounds normal.   Musculoskeletal: She exhibits no edema. Neurological: She is alert and oriented to person, place, and time. Skin: Skin is warm and dry. She is not diaphoretic. Psychiatric: She has a normal mood and affect. Nursing note and vitals reviewed. ASSESSMENT and PLAN    ICD-10-CM ICD-9-CM    1.  Dyspnea on exertion R06.09 786.09 AMB POC EKG ROUTINE W/ 12 LEADS, INTER & REP      XR CHEST PA LAT      METABOLIC PANEL, BASIC      CBC WITH AUTOMATED DIFF      TROPONIN I      NUCLEAR CARDIAC STRESS TEST albuterol (PROVENTIL HFA, VENTOLIN HFA, PROAIR HFA) 90 mcg/actuation inhaler   2. Hypertensive heart disease, unspecified whether heart failure present I11.9 402.90 LIPID PANEL      NUCLEAR CARDIAC STRESS TEST   3. Type 2 diabetes mellitus with nephropathy (HCC) W21.19 205.79 METABOLIC PANEL, BASIC     583.81 LIPID PANEL      HEMOGLOBIN A1C W/O EAG   4. Hypercholesterolemia E78.00 272.0 LIPID PANEL   5. Acquired hypothyroidism E03.9 244.9        She has a number of risk factor for CAD  Dyspnea is concerning but has no other significant features of angina today  This may all be stress related  May be allergy and weather related    Will update lab, CXR, and EKG    Albuterol MDI    Instructed to go to the ER for worsening or persistent sxs.  Or new sxs    F/u 2 weeks            Addn:  EKG with some non-specific ST Twave changes

## 2019-09-20 NOTE — PROGRESS NOTES
ROOM # 5    Hayden Cormier presents today for   Chief Complaint   Patient presents with    Wheezing       Hayden Cormier preferred language for health care discussion is english/other. Is someone accompanying this pt? no    Is the patient using any DME equipment during OV? no    Depression Screening:  3 most recent [facility] Screens 1/29/2019 11/7/2018 12/27/2017 12/5/2017 11/13/2017 11/13/2017 7/13/2017   Little interest or pleasure in doing things Not at all Not at all Not at all Not at all More than half the days Not at all Not at all   Feeling down, depressed, irritable, or hopeless Not at all Not at all Not at all Not at all More than half the days Not at all Not at all   Total Score PHQ 2 0 0 0 0 4 0 0   Trouble falling or staying asleep, or sleeping too much - Not at all - - - - -   Feeling tired or having little energy - Several days - - - - -   Poor appetite, weight loss, or overeating - Not at all - - - - -   Feeling bad about yourself - or that you are a failure or have let yourself or your family down - Several days - - - - -   Trouble concentrating on things such as school, work, reading, or watching TV - Not at all - - - - -   Moving or speaking so slowly that other people could have noticed; or the opposite being so fidgety that others notice - Nearly every day - - - - -   Thoughts of being better off dead, or hurting yourself in some way - Not at all - - - - -   PHQ 9 Score - 5 - - - - -       Learning Assessment:  Learning Assessment 5/19/2015   PRIMARY LEARNER Patient   HIGHEST LEVEL OF EDUCATION - PRIMARY LEARNER  > 4 YEARS [employer] LEARNER NONE   CO-LEARNER CAREGIVER No   PRIMARY LANGUAGE ENGLISH   LEARNER PREFERENCE PRIMARY READING     DEMONSTRATION   ANSWERED BY Patient   RELATIONSHIP SELF       Abuse Screening:  Abuse Screening Questionnaire 11/13/2017 5/19/2015   Do you ever feel afraid of your partner?  N N   Are you in a relationship with someone who physically or mentally threatens you? N N   Is it safe for you to go home? Y Y       Fall Risk  Fall Risk Assessment, last 12 mths 8/1/2019 4/8/2019 1/29/2019 11/7/2018 6/19/2018 12/27/2017 12/5/2017   Able to walk? Yes Yes Yes Yes Yes Yes Yes   Fall in past 12 months? Yes Yes No No No No No   Fall with injury? No Yes - - - - -   Number of falls in past 12 months 3 1 - - - - -   Fall Risk Score 3 2 - - - - -           Health Maintenance reviewed and discussed per provider. Yes    Esvin Grimm is due for   Health Maintenance Due   Topic Date Due    Shingrix Vaccine Age 49> (1 of 2) 09/15/1994    Influenza Age 5 to Adult  08/01/2019    HEMOGLOBIN A1C Q6M  10/03/2019     Please order/place referral if appropriate. Advance Directive:  1. Do you have an advance directive in place? Patient Reply: no    2. If not, would you like material regarding how to put one in place? Patient Reply: no    Coordination of Care:  1. Have you been to the ER, urgent care clinic since your last visit? Hospitalized since your last visit? no    2. Have you seen or consulted any other health care providers outside of the [address] [place] [address] since your last visit? Include any pap smears or colon screening.  no

## 2019-09-23 ENCOUNTER — HOSPITAL ENCOUNTER (OUTPATIENT)
Dept: LAB | Age: 74
Discharge: HOME | End: 2019-09-23
Payer: MEDICARE

## 2019-09-23 DIAGNOSIS — R06.09 DYSPNEA ON EXERTION: ICD-10-CM

## 2019-09-23 DIAGNOSIS — I11.9 HYPERTENSIVE HEART DISEASE, UNSPECIFIED WHETHER HEART FAILURE PRESENT: ICD-10-CM

## 2019-09-23 DIAGNOSIS — E78.00 HYPERCHOLESTEROLEMIA: ICD-10-CM

## 2019-09-23 DIAGNOSIS — E11.21 TYPE 2 DIABETES MELLITUS WITH NEPHROPATHY (HCC): ICD-10-CM

## 2019-09-23 LAB
ANION GAP SERPL CALC-SCNC: 6 MMOL/L (ref 3–18)
BASOPHILS # BLD: 0 K/UL (ref 0–0.1)
BASOPHILS NFR BLD: 0 % (ref 0–2)
BUN SERPL-MCNC: 16 MG/DL (ref 7–18)
BUN/CREAT SERPL: 12 (ref 12–20)
CALCIUM SERPL-MCNC: 10 MG/DL (ref 8.5–10.1)
CHLORIDE SERPL-SCNC: 107 MMOL/L (ref 100–111)
CO2 SERPL-SCNC: 28 MMOL/L (ref 21–32)
CREAT SERPL-MCNC: 1.36 MG/DL (ref 0.6–1.3)
DIFFERENTIAL METHOD BLD: ABNORMAL
EOSINOPHIL # BLD: 0.2 K/UL (ref 0–0.4)
EOSINOPHIL NFR BLD: 3 % (ref 0–5)
ERYTHROCYTE [DISTWIDTH] IN BLOOD BY AUTOMATED COUNT: 14.7 % (ref 11.6–14.5)
GLUCOSE SERPL-MCNC: 130 MG/DL (ref 74–99)
HBA1C MFR BLD: 6.3 % (ref 4.2–5.6)
HCT VFR BLD AUTO: 45.7 % (ref 35–45)
HGB BLD-MCNC: 14.6 G/DL (ref 12–16)
LYMPHOCYTES # BLD: 2.9 K/UL (ref 0.9–3.6)
LYMPHOCYTES NFR BLD: 42 % (ref 21–52)
MCH RBC QN AUTO: 27.5 PG (ref 24–34)
MCHC RBC AUTO-ENTMCNC: 31.9 G/DL (ref 31–37)
MCV RBC AUTO: 86.1 FL (ref 74–97)
MONOCYTES # BLD: 0.6 K/UL (ref 0.05–1.2)
MONOCYTES NFR BLD: 9 % (ref 3–10)
NEUTS SEG # BLD: 3.1 K/UL (ref 1.8–8)
NEUTS SEG NFR BLD: 46 % (ref 40–73)
PLATELET # BLD AUTO: 195 K/UL (ref 135–420)
PMV BLD AUTO: 11.9 FL (ref 9.2–11.8)
POTASSIUM SERPL-SCNC: 3.6 MMOL/L (ref 3.5–5.5)
RBC # BLD AUTO: 5.31 M/UL (ref 4.2–5.3)
SODIUM SERPL-SCNC: 141 MMOL/L (ref 136–145)
TROPONIN I SERPL-MCNC: <0.02 NG/ML (ref 0–0.04)
WBC # BLD AUTO: 6.8 K/UL (ref 4.6–13.2)

## 2019-09-23 PROCEDURE — 85025 COMPLETE CBC W/AUTO DIFF WBC: CPT

## 2019-09-23 PROCEDURE — 36415 COLL VENOUS BLD VENIPUNCTURE: CPT

## 2019-09-23 PROCEDURE — 80048 BASIC METABOLIC PNL TOTAL CA: CPT

## 2019-09-23 PROCEDURE — 83036 HEMOGLOBIN GLYCOSYLATED A1C: CPT

## 2019-09-23 PROCEDURE — 80061 LIPID PANEL: CPT

## 2019-09-23 PROCEDURE — 84484 ASSAY OF TROPONIN QUANT: CPT

## 2019-09-24 LAB
CHOLEST SERPL-MCNC: 179 MG/DL
HDLC SERPL-MCNC: 45 MG/DL (ref 40–60)
HDLC SERPL: 4 {RATIO} (ref 0–5)
LDLC SERPL CALC-MCNC: 103.2 MG/DL (ref 0–100)
LIPID PROFILE,FLP: ABNORMAL
TRIGL SERPL-MCNC: 154 MG/DL (ref ?–150)
VLDLC SERPL CALC-MCNC: 30.8 MG/DL

## 2019-09-25 DIAGNOSIS — Z76.0 MEDICATION REFILL: ICD-10-CM

## 2019-09-25 NOTE — TELEPHONE ENCOUNTER
Requested Prescriptions     Pending Prescriptions Disp Refills    SYNTHROID 100 mcg tablet 90 Tab 0     Sig: TAKE 1 TABLET BY MOUTH ONCE DAILY FOR HYPOTHYROIDISM

## 2019-09-26 RX ORDER — LEVOTHYROXINE SODIUM 100 UG/1
TABLET ORAL
Qty: 90 TAB | Refills: 0 | Status: SHIPPED | OUTPATIENT
Start: 2019-09-26 | End: 2020-01-03

## 2019-10-07 ENCOUNTER — OFFICE VISIT (OUTPATIENT)
Dept: INTERNAL MEDICINE CLINIC | Age: 74
End: 2019-10-07

## 2019-10-07 VITALS
HEART RATE: 73 BPM | RESPIRATION RATE: 22 BRPM | WEIGHT: 191 LBS | SYSTOLIC BLOOD PRESSURE: 128 MMHG | OXYGEN SATURATION: 95 % | DIASTOLIC BLOOD PRESSURE: 84 MMHG | BODY MASS INDEX: 32.61 KG/M2 | TEMPERATURE: 98.1 F | HEIGHT: 64 IN

## 2019-10-07 DIAGNOSIS — Z76.0 MEDICATION REFILL: ICD-10-CM

## 2019-10-07 DIAGNOSIS — R06.09 DYSPNEA ON EXERTION: Primary | ICD-10-CM

## 2019-10-07 RX ORDER — SPIRONOLACTONE 50 MG/1
50 TABLET, FILM COATED ORAL DAILY
Qty: 90 TAB | Refills: 3 | Status: SHIPPED | OUTPATIENT
Start: 2019-10-07 | End: 2020-10-18

## 2019-10-07 NOTE — PROGRESS NOTES
HISTORY OF PRESENT ILLNESS  Luz Cuba is a 75 y.o. female. Visit Vitals  /84 (BP 1 Location: Left arm, BP Patient Position: Sitting)   Pulse 73   Temp 98.1 °F (36.7 °C) (Oral)   Resp 22   Ht 5' 4\" (1.626 m)   Wt 191 lb (86.6 kg)   SpO2 95%   BMI 32.79 kg/m²       Here to f/u on dyspnea    Her CXR was OK  She had a stress test at [facility] that was very good except for some PVCs that were seen during exercise    She had PFT Jan 2016 that suggested small obstruction. She was not a smoker.  smoked a while but quit. Children do not smoke. No close friends smoke. Son and granddaughter have asthma. No occupational or travel exposure per pt EXCEPT   worked on [email or web address] and brought home a lot of dust.          Review of Systems   Constitutional: Negative for chills and fever. Respiratory: Positive for shortness of breath (on exertion). Cardiovascular: Negative for chest pain and palpitations. Neurological: Negative for dizziness and headaches. Physical Exam   Constitutional: She is oriented to person, place, and time. She appears well-developed and well-nourished. No distress. Cardiovascular: Normal rate and regular rhythm. Pulmonary/Chest: Effort normal and breath sounds normal.   Musculoskeletal: She exhibits no edema. Neurological: She is alert and oriented to person, place, and time. Skin: Skin is warm and dry. She is not diaphoretic. Psychiatric: She has a normal mood and affect. Nursing note and vitals reviewed. ASSESSMENT and PLAN    ICD-10-CM ICD-9-CM    1. Dyspnea on exertion R06.09 786.09    2. Medication refill Z76.0 V68.1 spironolactone (ALDACTONE) 50 mg tablet       There was no evidence of a significant heart problem. Suggest trial of medication to see if responds as COPD would. If so, can continue meds. VS updating PFTS . Reviewed recent lab with patient    F/u as appointed Dec 2 .

## 2019-10-07 NOTE — PROGRESS NOTES
ROOM # 5    Brittni Kumar presents today for   Chief Complaint   Patient presents with    Breathing Problem       Brittni Kumar preferred language for health care discussion is english/other. Is someone accompanying this pt? no    Is the patient using any DME equipment during OV? no    Depression Screening:  3 most recent [facility] Screens 1/29/2019 11/7/2018 12/27/2017 12/5/2017 11/13/2017 11/13/2017 7/13/2017   Little interest or pleasure in doing things Not at all Not at all Not at all Not at all More than half the days Not at all Not at all   Feeling down, depressed, irritable, or hopeless Not at all Not at all Not at all Not at all More than half the days Not at all Not at all   Total Score PHQ 2 0 0 0 0 4 0 0   Trouble falling or staying asleep, or sleeping too much - Not at all - - - - -   Feeling tired or having little energy - Several days - - - - -   Poor appetite, weight loss, or overeating - Not at all - - - - -   Feeling bad about yourself - or that you are a failure or have let yourself or your family down - Several days - - - - -   Trouble concentrating on things such as school, work, reading, or watching TV - Not at all - - - - -   Moving or speaking so slowly that other people could have noticed; or the opposite being so fidgety that others notice - Nearly every day - - - - -   Thoughts of being better off dead, or hurting yourself in some way - Not at all - - - - -   PHQ 9 Score - 5 - - - - -       Learning Assessment:  Learning Assessment 5/19/2015   PRIMARY LEARNER Patient   HIGHEST LEVEL OF EDUCATION - PRIMARY LEARNER  > 4 YEARS [employer] LEARNER NONE   CO-LEARNER CAREGIVER No   PRIMARY LANGUAGE ENGLISH   LEARNER PREFERENCE PRIMARY READING     DEMONSTRATION   ANSWERED BY Patient   RELATIONSHIP SELF       Abuse Screening:  Abuse Screening Questionnaire 11/13/2017 5/19/2015   Do you ever feel afraid of your partner?  N N   Are you in a relationship with someone who physically or mentally threatens you? N N   Is it safe for you to go home? Y Y       Fall Risk  Fall Risk Assessment, last 12 mths 8/1/2019 4/8/2019 1/29/2019 11/7/2018 6/19/2018 12/27/2017 12/5/2017   Able to walk? Yes Yes Yes Yes Yes Yes Yes   Fall in past 12 months? Yes Yes No No No No No   Fall with injury? No Yes - - - - -   Number of falls in past 12 months 3 1 - - - - -   Fall Risk Score 3 2 - - - - -           Health Maintenance reviewed and discussed per provider. Yes    Erick Henderson is due for   Health Maintenance Due   Topic Date Due    Shingrix Vaccine Age 49> (1 of 2) 09/15/1994    Influenza Age 5 to Adult  08/01/2019     Please order/place referral if appropriate. Advance Directive:  1. Do you have an advance directive in place? Patient Reply: no    2. If not, would you like material regarding how to put one in place? Patient Reply: no    Coordination of Care:  1. Have you been to the ER, urgent care clinic since your last visit? Hospitalized since your last visit? no    2. Have you seen or consulted any other health care providers outside of the [address] [place] [address] since your last visit? Include any pap smears or colon screening.  no

## 2019-10-11 DIAGNOSIS — R06.09 DYSPNEA ON EXERTION: ICD-10-CM

## 2019-10-11 DIAGNOSIS — I11.9 HYPERTENSIVE HEART DISEASE, UNSPECIFIED WHETHER HEART FAILURE PRESENT: ICD-10-CM

## 2019-12-02 ENCOUNTER — OFFICE VISIT (OUTPATIENT)
Dept: INTERNAL MEDICINE CLINIC | Age: 74
End: 2019-12-02

## 2019-12-02 VITALS
HEART RATE: 64 BPM | OXYGEN SATURATION: 98 % | SYSTOLIC BLOOD PRESSURE: 125 MMHG | HEIGHT: 64 IN | BODY MASS INDEX: 32.61 KG/M2 | TEMPERATURE: 97.6 F | RESPIRATION RATE: 20 BRPM | WEIGHT: 191 LBS | DIASTOLIC BLOOD PRESSURE: 78 MMHG

## 2019-12-02 DIAGNOSIS — E78.00 HYPERCHOLESTEROLEMIA: ICD-10-CM

## 2019-12-02 DIAGNOSIS — E03.9 ACQUIRED HYPOTHYROIDISM: ICD-10-CM

## 2019-12-02 DIAGNOSIS — I11.9 HYPERTENSIVE HEART DISEASE, UNSPECIFIED WHETHER HEART FAILURE PRESENT: Primary | ICD-10-CM

## 2019-12-02 DIAGNOSIS — E11.21 TYPE 2 DIABETES MELLITUS WITH NEPHROPATHY (HCC): ICD-10-CM

## 2019-12-02 NOTE — PROGRESS NOTES
ROOM # 5    Rajendra Pratt presents today for   Chief Complaint   Patient presents with    Breathing Problem    Fatigue       Rajendra Pratt preferred language for health care discussion is english/other. Is someone accompanying this pt? no    Is the patient using any DME equipment during OV? no    Depression Screening:  3 most recent [facility] Screens 1/29/2019 11/7/2018 12/27/2017 12/5/2017 11/13/2017 11/13/2017 7/13/2017   Little interest or pleasure in doing things Not at all Not at all Not at all Not at all More than half the days Not at all Not at all   Feeling down, depressed, irritable, or hopeless Not at all Not at all Not at all Not at all More than half the days Not at all Not at all   Total Score PHQ 2 0 0 0 0 4 0 0   Trouble falling or staying asleep, or sleeping too much - Not at all - - - - -   Feeling tired or having little energy - Several days - - - - -   Poor appetite, weight loss, or overeating - Not at all - - - - -   Feeling bad about yourself - or that you are a failure or have let yourself or your family down - Several days - - - - -   Trouble concentrating on things such as school, work, reading, or watching TV - Not at all - - - - -   Moving or speaking so slowly that other people could have noticed; or the opposite being so fidgety that others notice - Nearly every day - - - - -   Thoughts of being better off dead, or hurting yourself in some way - Not at all - - - - -   PHQ 9 Score - 5 - - - - -       Learning Assessment:  Learning Assessment 5/19/2015   PRIMARY LEARNER Patient   HIGHEST LEVEL OF EDUCATION - PRIMARY LEARNER  > 4 YEARS [address] [employer] LEARNER NONE   CO-LEARNER CAREGIVER No   PRIMARY LANGUAGE ENGLISH   LEARNER PREFERENCE PRIMARY READING     DEMONSTRATION   ANSWERED BY Patient   RELATIONSHIP SELF       Abuse Screening:  Abuse Screening Questionnaire 11/13/2017 5/19/2015   Do you ever feel afraid of your partner?  N N   Are you in a relationship with someone who physically or mentally threatens you? N N   Is it safe for you to go home? Y Y       Fall Risk  Fall Risk Assessment, last 12 mths 8/1/2019 4/8/2019 1/29/2019 11/7/2018 6/19/2018 12/27/2017 12/5/2017   Able to walk? Yes Yes Yes Yes Yes Yes Yes   Fall in past 12 months? Yes Yes No No No No No   Fall with injury? No Yes - - - - -   Number of falls in past 12 months 3 1 - - - - -   Fall Risk Score 3 2 - - - - -           Health Maintenance reviewed and discussed per provider. Yes    Bibiana Ulloa is due for   Health Maintenance Due   Topic Date Due    MEDICARE YEARLY EXAM  11/08/2019     Please order/place referral if appropriate. Advance Directive:  1. Do you have an advance directive in place? Patient Reply: no    2. If not, would you like material regarding how to put one in place? Patient Reply: no    Coordination of Care:  1. Have you been to the ER, urgent care clinic since your last visit? Hospitalized since your last visit? no    2. Have you seen or consulted any other health care providers outside of the [address] [place] [address] since your last visit? Include any pap smears or colon screening.  no

## 2019-12-02 NOTE — PROGRESS NOTES
HISTORY OF PRESENT ILLNESS  David Tapia is a 75 y.o. female. Visit Vitals  /78 (BP 1 Location: Left arm, BP Patient Position: Sitting)   Pulse 64   Temp 97.6 °F (36.4 °C) (Oral)   Resp 20   Ht 5' 4\" (1.626 m)   Wt 191 lb (86.6 kg)   SpO2 98%   BMI 32.79 kg/m²       Lots of stress with  being ill and on dialysis    Breathing Problem   The history is provided by the patient (trying to pace herself and is having less dyspnea). This is a chronic problem. The problem occurs frequently. The current episode started more than 1 week ago. The problem has been rapidly improving. Pertinent negatives include no fever, no headaches, no rhinorrhea, no sore throat, no hemoptysis, no wheezing, no chest pain, no leg swelling and no claudication. Fatigue   The history is provided by the patient. This is a chronic problem. The current episode started more than 1 week ago. The problem occurs daily. The problem has not changed since onset. Pertinent negatives include no chest pain and no headaches. Hypertension    The history is provided by the patient. This is a chronic problem. The current episode started more than 1 week ago. Associated symptoms include malaise/fatigue. Pertinent negatives include no chest pain, no palpitations, no headaches and no dizziness. There are no associated agents to hypertension. Risk factors include postmenopause and hypertension. Review of Systems   Constitutional: Positive for fatigue and malaise/fatigue. Negative for chills and fever. HENT: Negative for rhinorrhea and sore throat. Respiratory: Negative for hemoptysis and wheezing. Cardiovascular: Negative for chest pain, palpitations, claudication and leg swelling. Neurological: Negative for dizziness and headaches. Physical Exam  Vitals signs and nursing note reviewed. Constitutional:       General: She is not in acute distress. Appearance: She is well-developed. She is not diaphoretic.    Cardiovascular: Rate and Rhythm: Normal rate and regular rhythm. Pulmonary:      Effort: Pulmonary effort is normal.      Breath sounds: Normal breath sounds. Skin:     General: Skin is warm and dry. Neurological:      Mental Status: She is alert and oriented to person, place, and time. ASSESSMENT and PLAN    ICD-10-CM ICD-9-CM    1. Hypertensive heart disease, unspecified whether heart failure present I11.9 402.90    2. Type 2 diabetes mellitus with nephropathy (HCC) E11.21 250.40      583.81    3. Hypercholesterolemia E78.00 272.0    4. Acquired hypothyroidism E03.9 244.9        BP controlled    No lab today--it has been less than 3 months since last done    Dyspnea has resolved. Probably due to fatigue and lack of adequate rest.    Discussed BMI/weight, lifestyle, diet and exercise. Discussed effect on blood pressure, blood sugar, and joints especially  Focus on limiting white carbs, portion control, and moving more.     F/u 3 months for MWV, HTN, chol

## 2020-06-02 ENCOUNTER — HOSPITAL ENCOUNTER (OUTPATIENT)
Dept: LAB | Age: 75
Discharge: HOME | End: 2020-06-02
Payer: MEDICARE

## 2020-06-02 ENCOUNTER — OFFICE VISIT (OUTPATIENT)
Dept: INTERNAL MEDICINE CLINIC | Age: 75
End: 2020-06-02

## 2020-06-02 VITALS
SYSTOLIC BLOOD PRESSURE: 136 MMHG | TEMPERATURE: 97.7 F | HEART RATE: 62 BPM | RESPIRATION RATE: 18 BRPM | HEIGHT: 64 IN | DIASTOLIC BLOOD PRESSURE: 74 MMHG | BODY MASS INDEX: 32.23 KG/M2 | WEIGHT: 188.8 LBS | OXYGEN SATURATION: 98 %

## 2020-06-02 DIAGNOSIS — I11.9 HYPERTENSIVE HEART DISEASE WITHOUT HEART FAILURE: Chronic | ICD-10-CM

## 2020-06-02 DIAGNOSIS — E78.00 HYPERCHOLESTEROLEMIA: Chronic | ICD-10-CM

## 2020-06-02 DIAGNOSIS — E03.9 HYPOTHYROIDISM, UNSPECIFIED TYPE: Chronic | ICD-10-CM

## 2020-06-02 DIAGNOSIS — E11.21 TYPE 2 DIABETES MELLITUS WITH NEPHROPATHY (HCC): Chronic | ICD-10-CM

## 2020-06-02 DIAGNOSIS — Z00.00 MEDICARE ANNUAL WELLNESS VISIT, SUBSEQUENT: Primary | ICD-10-CM

## 2020-06-02 PROBLEM — N18.30 CKD (CHRONIC KIDNEY DISEASE) STAGE 3, GFR 30-59 ML/MIN (HCC): Status: ACTIVE | Noted: 2020-06-02

## 2020-06-02 LAB
ALBUMIN SERPL-MCNC: 4.2 G/DL (ref 3.4–5)
ALBUMIN/GLOB SERPL: 1.2 {RATIO} (ref 0.8–1.7)
ALP SERPL-CCNC: 67 U/L (ref 45–117)
ALT SERPL-CCNC: 15 U/L (ref 13–56)
ANION GAP SERPL CALC-SCNC: 6 MMOL/L (ref 3–18)
AST SERPL-CCNC: 18 U/L (ref 10–38)
BILIRUB SERPL-MCNC: 0.5 MG/DL (ref 0.2–1)
BUN SERPL-MCNC: 12 MG/DL (ref 7–18)
BUN/CREAT SERPL: 9 (ref 12–20)
CALCIUM SERPL-MCNC: 9.7 MG/DL (ref 8.5–10.1)
CHLORIDE SERPL-SCNC: 111 MMOL/L (ref 100–111)
CHOLEST SERPL-MCNC: 179 MG/DL
CO2 SERPL-SCNC: 28 MMOL/L (ref 21–32)
CREAT SERPL-MCNC: 1.34 MG/DL (ref 0.6–1.3)
GLOBULIN SER CALC-MCNC: 3.6 G/DL (ref 2–4)
GLUCOSE SERPL-MCNC: 83 MG/DL (ref 74–99)
HBA1C MFR BLD: 6.5 % (ref 4.2–5.6)
HDLC SERPL-MCNC: 51 MG/DL (ref 40–60)
HDLC SERPL: 3.5 {RATIO} (ref 0–5)
LDLC SERPL CALC-MCNC: 95 MG/DL (ref 0–100)
LIPID PROFILE,FLP: ABNORMAL
POTASSIUM SERPL-SCNC: 3.9 MMOL/L (ref 3.5–5.5)
PROT SERPL-MCNC: 7.8 G/DL (ref 6.4–8.2)
SODIUM SERPL-SCNC: 145 MMOL/L (ref 136–145)
T4 FREE SERPL-MCNC: 1.4 NG/DL (ref 0.7–1.5)
TRIGL SERPL-MCNC: 165 MG/DL (ref ?–150)
TSH SERPL DL<=0.05 MIU/L-ACNC: 0.73 UIU/ML (ref 0.36–3.74)
VLDLC SERPL CALC-MCNC: 33 MG/DL

## 2020-06-02 PROCEDURE — 83036 HEMOGLOBIN GLYCOSYLATED A1C: CPT

## 2020-06-02 PROCEDURE — 84439 ASSAY OF FREE THYROXINE: CPT

## 2020-06-02 PROCEDURE — 80061 LIPID PANEL: CPT

## 2020-06-02 PROCEDURE — 36415 COLL VENOUS BLD VENIPUNCTURE: CPT

## 2020-06-02 PROCEDURE — 80053 COMPREHEN METABOLIC PANEL: CPT

## 2020-06-02 NOTE — PROGRESS NOTES
Rowena Martin is a 75 y.o. female (: 1945) presenting to address:    Chief Complaint   Patient presents with    Annual Wellness Visit       Vitals:    20 1119   BP: 136/74   Pulse: 62   Resp: 18   Temp: 97.7 °F (36.5 °C)   TempSrc: Oral   SpO2: 98%   Weight: 188 lb 12.8 oz (85.6 kg)   Height: 5' 4\" (1.626 m)   PainSc:   5       Hearing/Vision:   No exam data present    Learning Assessment:     Learning Assessment 2015   PRIMARY LEARNER Patient   HIGHEST LEVEL OF EDUCATION - PRIMARY LEARNER  > 4 YEARS OF COLLEGE   BARRIERS PRIMARY LEARNER NONE   CO-LEARNER CAREGIVER No   PRIMARY LANGUAGE ENGLISH   LEARNER PREFERENCE PRIMARY READING     DEMONSTRATION   ANSWERED BY Patient   RELATIONSHIP SELF     Depression Screening:     3 most recent PHQ Screens 2020   Little interest or pleasure in doing things Not at all   Feeling down, depressed, irritable, or hopeless Not at all   Total Score PHQ 2 0   Trouble falling or staying asleep, or sleeping too much -   Feeling tired or having little energy -   Poor appetite, weight loss, or overeating -   Feeling bad about yourself - or that you are a failure or have let yourself or your family down -   Trouble concentrating on things such as school, work, reading, or watching TV -   Moving or speaking so slowly that other people could have noticed; or the opposite being so fidgety that others notice -   Thoughts of being better off dead, or hurting yourself in some way -   PHQ 9 Score -     Fall Risk Assessment:     Fall Risk Assessment, last 12 mths 2020   Able to walk? Yes   Fall in past 12 months? Yes   Fall with injury? No   Number of falls in past 12 months 2   Fall Risk Score 2     Abuse Screening:     Abuse Screening Questionnaire 2020   Do you ever feel afraid of your partner? N   Are you in a relationship with someone who physically or mentally threatens you? N   Is it safe for you to go home? Y     Coordination of Care Questionaire:   1.  Have you been to the ER, urgent care clinic since your last visit? Hospitalized since your last visit? NO    2. Have you seen or consulted any other health care providers outside of the [address] [place] [address] since your last visit? Include any pap smears or colon screening. NO    Advanced Directive:   1. Do you have an Advanced Directive? NO    2. Would you like information on Advanced Directives?  NO

## 2020-06-02 NOTE — PROGRESS NOTES
The following is a separate encounter visit:    HISTORY OF PRESENT ILLNESS  Dick Julian is a 75 y.o. female. /74 (BP 1 Location: Right arm, BP Patient Position: Sitting)   Pulse 62   Temp 97.7 °F (36.5 °C) (Oral)   Resp 18   Ht 5' 4\" (1.626 m)   Wt 188 lb 12.8 oz (85.6 kg)   SpO2 98%   BMI 32.41 kg/m²           Current Outpatient Medications:  SYNTHROID 100 mcg tablet, TAKE 1 TABLET BY MOUTH ONCE DAILY FOR HYPOTHYROIDISM  spironolactone (ALDACTONE) 50 mg tablet, Take 1 Tab by mouth daily. Indications: high blood pressure  albuterol (PROVENTIL HFA, VENTOLIN HFA, PROAIR HFA) 90 mcg/actuation inhaler, Take 1 Puff by inhalation every four (4) hours as needed for Wheezing. simvastatin (ZOCOR) 40 mg tablet, TAKE 1 TABLET BY MOUTH NIGHTLY  metoprolol tartrate (LOPRESSOR) 50 mg tablet, TAKE 1 TABLET BY MOUTH EVERY DAY  loratadine (CLARITIN) 10 mg tablet, Take 1 Tab by mouth daily. ergocalciferol (ERGOCALCIFEROL) 50,000 unit capsule, Take 1 Cap by mouth every seven (7) days. Indications: VITAMIN D DEFICIENCY (HIGH DOSE THERAPY) (Patient taking differently: Take 50,000 Units by mouth every month.)  BABY ASPIRIN PO, Take 81 mg by mouth.  ketoconazole (NIZORAL) 2 % topical cream, APPLY A THIN LAYER TO AFFECTED AREA TWICE A DAY  hydrocortisone (HYTONE) 2.5 % ointment, APPLY TO AFFECTED AREAS DAILY AS DIRECTED WITH KETOCONAZOLE CREAM  ammonium lactate (LAC-HYDRIN) 12 % lotion, Use 1 Application to affected area Take As Needed. diclofenac (VOLTAREN) 1 % gel, APPLY QUARTER SIZED AMOUNT TO AFFECTED AREA(S) 3 TIMES DAILY          Hypertension    The history is provided by the patient. This is a chronic problem. The current episode started more than 1 week ago. The problem has not changed since onset. Pertinent negatives include no chest pain and no shortness of breath. There are no associated agents to hypertension. Risk factors include postmenopause, obesity, dyslipidemia and diabetes mellitus.    Diabetes   The history is provided by the patient. This is a chronic problem. The current episode started more than 1 week ago. The problem occurs daily. The problem has not changed since onset. Pertinent negatives include no chest pain and no shortness of breath. Exacerbated by: diet. The symptoms are relieved by medications (diet). Review of Systems   Constitutional: Negative for chills and fever. Respiratory: Negative for shortness of breath. Cardiovascular: Negative for chest pain. Genitourinary: Negative for frequency and urgency. Endo/Heme/Allergies: Negative for polydipsia. Physical Exam  Vitals signs and nursing note reviewed. Constitutional:       Appearance: She is well-developed. Cardiovascular:      Rate and Rhythm: Normal rate and regular rhythm. Pulmonary:      Effort: Pulmonary effort is normal.      Breath sounds: Normal breath sounds. Skin:     General: Skin is warm and dry. Neurological:      General: No focal deficit present. Mental Status: She is alert and oriented to person, place, and time. Comments: Diabetic foot exam:     Left Foot:   Visual Exam: bunion   Pulse DP: 2+ (normal)   Filament test: normal sensation    Vibratory sensation: diminished      Right Foot:   Visual Exam: bunion   Pulse DP: 2+ (normal)   Filament test: normal sensation    Vibratory sensation: diminished     Psychiatric:         Mood and Affect: Mood normal.         ASSESSMENT and PLAN    ICD-10-CM ICD-9-CM           2. Type 2 diabetes mellitus with nephropathy (HCC) F55.99 134.36 METABOLIC PANEL, COMPREHENSIVE     583.81 LIPID PANEL      HEMOGLOBIN A1C W/O EAG       DIABETES FOOT EXAM   3. Hypertensive heart disease without heart failure T21.3 591.67 METABOLIC PANEL, COMPREHENSIVE      LIPID PANEL   4. Hypothyroidism, unspecified type E03.9 244.9 TSH AND FREE T4   5.  Hypercholesterolemia E78.00 272.0 LIPID PANEL       BP controlled    DM has been controlled    Update lab    F/u 6 months for HTN, DM, chol

## 2020-06-02 NOTE — PATIENT INSTRUCTIONS
Medicare Wellness Visit, Female     The best way to live healthy is to have a lifestyle where you eat a well-balanced diet, exercise regularly, limit alcohol use, and quit all forms of tobacco/nicotine, if applicable. Regular preventive services are another way to keep healthy. Preventive services (vaccines, screening tests, monitoring & exams) can help personalize your care plan, which helps you manage your own care. Screening tests can find health problems at the earliest stages, when they are easiest to treat. Johanna follows the current, evidence-based guidelines published by the [facility] Ryan Malena ([facility]) when recommending preventive services for our patients. Because we follow these guidelines, sometimes recommendations change over time as research supports it. (For example, mammograms used to be recommended annually. Even though Medicare will still pay for an annual mammogram, the newer guidelines recommend a mammogram every two years for women of average risk). Of course, you and your doctor may decide to screen more often for some diseases, based on your risk and your co-morbidities (chronic disease you are already diagnosed with). Preventive services for you include:  - Medicare offers their members a free annual wellness visit, which is time for you and your primary care provider to discuss and plan for your preventive service needs. Take advantage of this benefit every year!  -All adults over the age of 72 should receive the recommended pneumonia vaccines. Current USPSTF guidelines recommend a series of two vaccines for the best pneumonia protection.   -All adults should have a flu vaccine yearly and a tetanus vaccine every 10 years.   -All adults age [address] and older should receive the shingles vaccines (series of two vaccines).       -All adults age 38-68 who are overweight should have a diabetes screening test once every three years.   -All adults born between [birth year] and 1965 should be screened once for Hepatitis C.  -Other screening tests and preventive services for persons with diabetes include: an eye exam to screen for diabetic retinopathy, a kidney function test, a foot exam, and stricter control over your cholesterol.   -Cardiovascular screening for adults with routine risk involves an electrocardiogram (ECG) at intervals determined by your doctor.   -Colorectal cancer screenings should be done for adults age 54-65 with no increased risk factors for colorectal cancer. There are a number of acceptable methods of screening for this type of cancer. Each test has its own benefits and drawbacks. Discuss with your doctor what is most appropriate for you during your annual wellness visit. The different tests include: colonoscopy (considered the best screening method), a fecal occult blood test, a fecal DNA test, and sigmoidoscopy.    -A bone mass density test is recommended when a woman turns 65 to screen for osteoporosis. This test is only recommended one time, as a screening. Some providers will use this same test as a disease monitoring tool if you already have osteoporosis. -Breast cancer screenings are recommended every other year for women of normal risk, age 54-69.  -Cervical cancer screenings for women over age 72 are only recommended with certain risk factors. Here is a list of your current Health Maintenance items (your personalized list of preventive services) with a due date:  Health Maintenance Due   Topic Date Due    Annual Well Visit  11/08/2019    Diabetic Foot Care  04/08/2020         Medicare Wellness Visit, Female     The best way to live healthy is to have a lifestyle where you eat a well-balanced diet, exercise regularly, limit alcohol use, and quit all forms of tobacco/nicotine, if applicable. Regular preventive services are another way to keep healthy.  Preventive services (vaccines, screening tests, monitoring & exams) can help personalize your care plan, which helps you manage your own care. Screening tests can find health problems at the earliest stages, when they are easiest to treat. Johanna follows the current, evidence-based guidelines published by the [facility] Ryan Guy ([facility]) when recommending preventive services for our patients. Because we follow these guidelines, sometimes recommendations change over time as research supports it. (For example, mammograms used to be recommended annually. Even though Medicare will still pay for an annual mammogram, the newer guidelines recommend a mammogram every two years for women of average risk). Of course, you and your doctor may decide to screen more often for some diseases, based on your risk and your co-morbidities (chronic disease you are already diagnosed with). Preventive services for you include:  - Medicare offers their members a free annual wellness visit, which is time for you and your primary care provider to discuss and plan for your preventive service needs. Take advantage of this benefit every year!  -All adults over the age of 72 should receive the recommended pneumonia vaccines. Current USPSTF guidelines recommend a series of two vaccines for the best pneumonia protection.   -All adults should have a flu vaccine yearly and a tetanus vaccine every 10 years.   -All adults age 48 and older should receive the shingles vaccines (series of two vaccines).       -All adults age 38-68 who are overweight should have a diabetes screening test once every three years.   -All adults born between 80 and 1965 should be screened once for Hepatitis C.  -Other screening tests and preventive services for persons with diabetes include: an eye exam to screen for diabetic retinopathy, a kidney function test, a foot exam, and stricter control over your cholesterol.   -Cardiovascular screening for adults with routine risk involves an electrocardiogram (ECG) at intervals determined by your doctor.   -Colorectal cancer screenings should be done for adults age 54-65 with no increased risk factors for colorectal cancer. There are a number of acceptable methods of screening for this type of cancer. Each test has its own benefits and drawbacks. Discuss with your doctor what is most appropriate for you during your annual wellness visit. The different tests include: colonoscopy (considered the best screening method), a fecal occult blood test, a fecal DNA test, and sigmoidoscopy.    -A bone mass density test is recommended when a woman turns 65 to screen for osteoporosis. This test is only recommended one time, as a screening. Some providers will use this same test as a disease monitoring tool if you already have osteoporosis. -Breast cancer screenings are recommended every other year for women of normal risk, age 54-69.  -Cervical cancer screenings for women over age 72 are only recommended with certain risk factors.      Here is a list of your current Health Maintenance items (your personalized list of preventive services) with a due date:  Health Maintenance Due   Topic Date Due    Annual Well Visit  11/08/2019    Diabetic Foot Care  04/08/2020

## 2020-06-02 NOTE — PROGRESS NOTES
This is the Subsequent Medicare Annual Wellness Exam, performed 12 months or more after the Initial AWV or the last Subsequent AWV    I have reviewed the patient's medical history in detail and updated the computerized patient record. History     Patient Active Problem List   Diagnosis Code    Post concussion syndrome F07.81    HCVD (hypertensive cardiovascular disease) I11.9    GERD (gastroesophageal reflux disease) K21.9    Hypothyroidism E03.9    Hypercholesterolemia E78.00    Type 2 diabetes mellitus with nephropathy (HCC) E11.21    Vertigo R42    Vitamin D deficiency E55.9     Past Medical History:   Diagnosis Date    Benign neoplasm of choroid, right     BPPV (benign paroxysmal positional vertigo) 01/12/2018    had ENG at Dr. Susan Lisa office    Cataracts, bilateral     age related   [facility] CRI (chronic renal insufficiency)     stage 3    Diabetes mellitus, type 2 ([facility] [address] 2/2016    by HBA1c    Disease of inner ear     GERD (gastroesophageal reflux disease) 12/2/2011    Hypercholesterolemia     Hypertension     Hypothyroidism 12/2/2011      Past Surgical History:   Procedure Laterality Date    HX BREAST REDUCTION      bilateral    HX CARPAL TUNNEL RELEASE      bilateral     HX COLONOSCOPY  7/12/13    no f/u, Dr. Selam Denton      right tympanicplastty    HX HYSTERECTOMY      partial    HX ORTHOPAEDIC      right ulna    HX ORTHOPAEDIC      bilateral trigger finger releases    AL BIOPSY OF BREAST, INCISIONAL  1995    left     Current Outpatient Medications   Medication Sig Dispense Refill    SYNTHROID 100 mcg tablet TAKE 1 TABLET BY MOUTH ONCE DAILY FOR HYPOTHYROIDISM 90 Tab 5    spironolactone (ALDACTONE) 50 mg tablet Take 1 Tab by mouth daily. Indications: high blood pressure 90 Tab 3    albuterol (PROVENTIL HFA, VENTOLIN HFA, PROAIR HFA) 90 mcg/actuation inhaler Take 1 Puff by inhalation every four (4) hours as needed for Wheezing.  1 Inhaler 1    simvastatin (ZOCOR) 40 mg tablet TAKE 1 TABLET BY MOUTH NIGHTLY 90 Tab 3    metoprolol tartrate (LOPRESSOR) 50 mg tablet TAKE 1 TABLET BY MOUTH EVERY DAY 90 Tab 3    loratadine (CLARITIN) 10 mg tablet Take 1 Tab by mouth daily. 90 Tab 4    ergocalciferol (ERGOCALCIFEROL) 50,000 unit capsule Take 1 Cap by mouth every seven (7) days. Indications: VITAMIN D DEFICIENCY (HIGH DOSE THERAPY) (Patient taking differently: Take 50,000 Units by mouth every month.) 12 Cap 3    BABY ASPIRIN PO Take 81 mg by mouth.  ketoconazole (NIZORAL) 2 % topical cream APPLY A THIN LAYER TO AFFECTED AREA TWICE A DAY  4    hydrocortisone (HYTONE) 2.5 % ointment APPLY TO AFFECTED AREAS DAILY AS DIRECTED WITH KETOCONAZOLE CREAM  2    ammonium lactate (LAC-HYDRIN) 12 % lotion Use 1 Application to affected area Take As Needed.       diclofenac (VOLTAREN) 1 % gel APPLY QUARTER SIZED AMOUNT TO AFFECTED AREA(S) 3 TIMES DAILY  3     Allergies   Allergen Reactions    Amoxicillin Rash    Ampicillin Rash    Codeine Nausea and Vomiting    Tussionex Other (comments)     Hallucinations    Valium [Diazepam] Vertigo       Family History   Problem Relation Age of Onset    Heart Disease Mother     Diabetes Mother         dialysis    Alcohol abuse Father     Heart defect Father     Alcohol abuse Sister     Heart defect Sister     Diabetes Maternal Aunt     Diabetes Maternal Uncle     Diabetes Maternal Grandmother     Stroke Maternal Grandmother     Diabetes Maternal Aunt     Diabetes Maternal Uncle     Diabetes Daughter     Cancer Daughter     Hypertension Daughter     Migraines Son      Social History     Tobacco Use    Smoking status: Never Smoker    Smokeless tobacco: Never Used   Substance Use Topics    Alcohol use: No       Depression Risk Factor Screening:     3 most recent PHQ Screens 6/2/2020   Little interest or pleasure in doing things Not at all   Feeling down, depressed, irritable, or hopeless Not at all   Total Score PHQ 2 0   Trouble falling or staying asleep, or sleeping too much -   Feeling tired or having little energy -   Poor appetite, weight loss, or overeating -   Feeling bad about yourself - or that you are a failure or have let yourself or your family down -   Trouble concentrating on things such as school, work, reading, or watching TV -   Moving or speaking so slowly that other people could have noticed; or the opposite being so fidgety that others notice -   Thoughts of being better off dead, or hurting yourself in some way -   PHQ 9 Score -       Alcohol Risk Factor Screening:   Do you average 1 drink per night or more than 7 drinks a week:  No    On any one occasion in the past three months have you have had more than 3 drinks containing alcohol:  No      Functional Ability and Level of Safety:   Hearing: probably has some but not enough for hearing aids    Activities of Daily Living: The home contains: grab bars  Patient does total self care    Ambulation: with mild difficulty    Fall Risk:  Fall Risk Assessment, last 12 mths 6/2/2020   Able to walk? Yes   Fall in past 12 months? Yes   Fall with injury? No   Number of falls in past 12 months 2   Fall Risk Score 2       Abuse Screen:  Patient is not abused    Cognitive Screening   Has your family/caregiver stated any concerns about your memory: no  Cognitive Screening: Normal - Animal Naming Test--actually fruit naming    Patient Care Team   Patient Care Team:  Corazon Sam MD as PCP - General (Internal Medicine)  Corazon Sam MD as PCP - [facility] Empaneled Provider  Francesca Cooks, DPM (Podiatry)  Valeria Lobato MD (Otolaryngology)  Samreen Layton MD as Consulting Provider (Ophthalmology)  Keyla Ortiz MD as Consulting Provider (Nephrology)    Assessment/Plan   Education and counseling provided:  Are appropriate based on today's review and evaluation    Diagnoses and all orders for this visit:    1.  Medicare annual wellness visit, subsequent          Health Maintenance Due   Topic Date Due    Medicare Yearly Exam  11/08/2019    Foot Exam Q1  04/08/2020

## 2020-07-24 ENCOUNTER — TELEPHONE (OUTPATIENT)
Dept: INTERNAL MEDICINE CLINIC | Age: 75
End: 2020-07-24

## 2020-07-24 DIAGNOSIS — F32.89 OTHER DEPRESSION: Primary | ICD-10-CM

## 2020-07-24 RX ORDER — ESCITALOPRAM OXALATE 5 MG/1
5 TABLET ORAL DAILY
Qty: 30 TAB | Refills: 2 | Status: SHIPPED | OUTPATIENT
Start: 2020-07-24 | End: 2020-09-21 | Stop reason: SDUPTHER

## 2020-07-24 NOTE — TELEPHONE ENCOUNTER
Patient is asking if there is something that can be prescribed to help with the depression since loosing her .

## 2020-07-24 NOTE — TELEPHONE ENCOUNTER
I will start her on some lexapro. Please ask her to schedule an appt in the next 3-4 weeks.  It can be virtual if needed

## 2020-08-27 ENCOUNTER — VIRTUAL VISIT (OUTPATIENT)
Dept: INTERNAL MEDICINE CLINIC | Age: 75
End: 2020-08-27

## 2020-08-27 DIAGNOSIS — F32.89 OTHER DEPRESSION: Primary | ICD-10-CM

## 2020-08-27 DIAGNOSIS — F43.21 GRIEF: ICD-10-CM

## 2020-08-27 NOTE — PROGRESS NOTES
VV using email:  [email or web address]    Roxann Odonnell presents today for   Chief Complaint   Patient presents with    Depression     feeling, eating, and sleeping better since death of        Roxann Odonnell preferred language for health care discussion is english/other. Depression Screening:  3 most recent PHQ Screens 8/27/2020 6/2/2020 1/29/2019 11/7/2018 12/27/2017 12/5/2017 11/13/2017   PHQ Not Done (No Data) - - - - - -   Little interest or pleasure in doing things More than half the days Not at all Not at all Not at all Not at all Not at all More than half the days   Feeling down, depressed, irritable, or hopeless More than half the days Not at all Not at all Not at all Not at all Not at all More than half the days   Total Score PHQ 2 4 0 0 0 0 0 4   Trouble falling or staying asleep, or sleeping too much - - - Not at all - - -   Feeling tired or having little energy - - - Several days - - -   Poor appetite, weight loss, or overeating - - - Not at all - - -   Feeling bad about yourself - or that you are a failure or have let yourself or your family down - - - Several days - - -   Trouble concentrating on things such as school, work, reading, or watching TV - - - Not at all - - -   Moving or speaking so slowly that other people could have noticed; or the opposite being so fidgety that others notice - - - Nearly every day - - -   Thoughts of being better off dead, or hurting yourself in some way - - - Not at all - - -   PHQ 9 Score - - - 5 - - -       Learning Assessment:  Learning Assessment 5/19/2015   PRIMARY LEARNER Patient   HIGHEST LEVEL OF EDUCATION - PRIMARY LEARNER  > 4 YEARS OF COLLEGE   BARRIERS PRIMARY LEARNER NONE   CO-LEARNER CAREGIVER No   PRIMARY LANGUAGE ENGLISH   LEARNER PREFERENCE PRIMARY READING     DEMONSTRATION   ANSWERED BY Patient   RELATIONSHIP SELF       Abuse Screening:  Abuse Screening Questionnaire 6/2/2020 11/13/2017 5/19/2015   Do you ever feel afraid of your partner? N N N   Are you in a relationship with someone who physically or mentally threatens you? N N N   Is it safe for you to go home? Bruno Bragg       Fall Risk  Fall Risk Assessment, last 12 mths 8/27/2020 6/2/2020 8/1/2019 4/8/2019 1/29/2019 11/7/2018 6/19/2018   Able to walk? Yes Yes Yes Yes Yes Yes Yes   Fall in past 12 months? No Yes Yes Yes No No No   Fall with injury? - No No Yes - - -   Number of falls in past 12 months - 2 3 1 - - -   Fall Risk Score - 2 3 2 - - -       Health Maintenance reviewed and discussed per provider. Yes    [facility] is due for There are no preventive care reminders to display for this patient. Please order/place referral if appropriate. Advance Directive:  1. Do you have an advance directive in place? Patient Reply: NO    2. If not, would you like material regarding how to put one in place? Patient Reply: NO    Coordination of Care:  1. Have you been to the ER, urgent care clinic since your last visit? Hospitalized since your last visit? NO    2. Have you seen or consulted any other health care providers outside of the [address] [place] [address] since your last visit? Include any pap smears or colon screening.  PT-8/17/20

## 2020-08-27 NOTE — PROGRESS NOTES
Dixon Kumar is a 75 y.o. female who was seen by synchronous (real-time) audio-video technology on 8/27/2020. Consent: Dixon Kumar, who was seen by synchronous (real-time) audio-video technology, and/or her healthcare decision maker, is aware that this patient-initiated, Telehealth encounter on 8/27/2020 is a billable service, with coverage as determined by her insurance carrier. She is aware that she may receive a bill and has provided verbal consent to proceed: Yes. Assessment & Plan:   Diagnoses and all orders for this visit:    1. Other depression    2. Grief    Greater than 50% of appointment time was spent in counseling. discussed grief couselling. [facility] offers some grief counselling. Discussed medication and long term use. Will continue same dose for now. F/u here one month for recheck      712  Subjective:   Dixon Kumar is a 75 y.o. female who was seen for Depression (feeling, eating, and sleeping better since death of )    Recheck on new meds. Lexapro, for stress and depression after her 's death. She is visiting with her daughter. She was able to talk to her son some as well    Depression   The history is provided by the patient. This is a chronic problem. The current episode started more than 1 week ago. The problem occurs daily. The problem has been gradually improving. Prior to Admission medications    Medication Sig Start Date End Date Taking? Authorizing Provider   escitalopram oxalate (LEXAPRO) 5 mg tablet Take 1 Tab by mouth daily. 7/24/20  Yes Marlene Martins MD   SYNTHROID 100 mcg tablet TAKE 1 TABLET BY MOUTH ONCE DAILY FOR HYPOTHYROIDISM 1/3/20  Yes Marlene Martins MD   spironolactone (ALDACTONE) 50 mg tablet Take 1 Tab by mouth daily.  Indications: high blood pressure 10/7/19  Yes Marlene Martins MD   simvastatin (ZOCOR) 40 mg tablet TAKE 1 TABLET BY MOUTH NIGHTLY 8/30/19  Yes Mireille Rubin MD   metoprolol tartrate (LOPRESSOR) 50 mg tablet TAKE 1 TABLET BY MOUTH EVERY DAY 8/30/19  Yes Carlton Martins MD   hydrocortisone (HYTONE) 2.5 % ointment APPLY TO AFFECTED AREAS DAILY AS DIRECTED WITH KETOCONAZOLE CREAM 1/16/19  Yes Provider, Historical   ergocalciferol (ERGOCALCIFEROL) 50,000 unit capsule Take 1 Cap by mouth every seven (7) days. Indications: VITAMIN D DEFICIENCY (HIGH DOSE THERAPY)  Patient taking differently: Take 50,000 Units by mouth every month. 6/19/18  Yes Carlton Martins MD   BABY ASPIRIN PO Take 81 mg by mouth. Yes Provider, Historical   albuterol (PROVENTIL HFA, VENTOLIN HFA, PROAIR HFA) 90 mcg/actuation inhaler Take 1 Puff by inhalation every four (4) hours as needed for Wheezing. 9/20/19   Carlton Martins MD   ketoconazole (NIZORAL) 2 % topical cream APPLY A THIN LAYER TO AFFECTED AREA TWICE A DAY 1/16/19   Provider, Historical   ammonium lactate (LAC-HYDRIN) 12 % lotion Use 1 Application to affected area Take As Needed. 11/16/18   Provider, Historical   loratadine (CLARITIN) 10 mg tablet Take 1 Tab by mouth daily.  1/29/19   Cedric Perkins MD   diclofenac (VOLTAREN) 1 % gel APPLY QUARTER SIZED AMOUNT TO AFFECTED AREA(S) 3 TIMES DAILY 9/13/17   Provider, Historical     Allergies   Allergen Reactions    Amoxicillin Rash    Ampicillin Rash    Codeine Nausea and Vomiting    Tussionex Other (comments)     Hallucinations    Valium [Diazepam] Vertigo       Patient Active Problem List    Diagnosis Date Noted    CKD (chronic kidney disease) stage 3, GFR 30-59 ml/min ([facility] [address] 06/02/2020    Vitamin D deficiency 06/19/2018    Vertigo 02/20/2018    [facility] 12/27/2017    Hypercholesterolemia     Post concussion syndrome 12/02/2011    HCVD (hypertensive cardiovascular disease) 12/02/2011    GERD (gastroesophageal reflux disease) 12/02/2011    Hypothyroidism 12/02/2011     Current Outpatient Medications   Medication Sig Dispense Refill    escitalopram oxalate (LEXAPRO) 5 mg tablet Take 1 Tab by mouth daily. 30 Tab 2    SYNTHROID 100 mcg tablet TAKE 1 TABLET BY MOUTH ONCE DAILY FOR HYPOTHYROIDISM 90 Tab 5    spironolactone (ALDACTONE) 50 mg tablet Take 1 Tab by mouth daily. Indications: high blood pressure 90 Tab 3    simvastatin (ZOCOR) 40 mg tablet TAKE 1 TABLET BY MOUTH NIGHTLY 90 Tab 3    metoprolol tartrate (LOPRESSOR) 50 mg tablet TAKE 1 TABLET BY MOUTH EVERY DAY 90 Tab 3    hydrocortisone (HYTONE) 2.5 % ointment APPLY TO AFFECTED AREAS DAILY AS DIRECTED WITH KETOCONAZOLE CREAM  2    ergocalciferol (ERGOCALCIFEROL) 50,000 unit capsule Take 1 Cap by mouth every seven (7) days. Indications: VITAMIN D DEFICIENCY (HIGH DOSE THERAPY) (Patient taking differently: Take 50,000 Units by mouth every month.) 12 Cap 3    BABY ASPIRIN PO Take 81 mg by mouth.  albuterol (PROVENTIL HFA, VENTOLIN HFA, PROAIR HFA) 90 mcg/actuation inhaler Take 1 Puff by inhalation every four (4) hours as needed for Wheezing. 1 Inhaler 1    ketoconazole (NIZORAL) 2 % topical cream APPLY A THIN LAYER TO AFFECTED AREA TWICE A DAY  4    ammonium lactate (LAC-HYDRIN) 12 % lotion Use 1 Application to affected area Take As Needed.  loratadine (CLARITIN) 10 mg tablet Take 1 Tab by mouth daily. 90 Tab 4    diclofenac (VOLTAREN) 1 % gel APPLY QUARTER SIZED AMOUNT TO AFFECTED AREA(S) 3 TIMES DAILY  3     Past Medical History:   Diagnosis Date    Benign neoplasm of choroid, right     BPPV (benign paroxysmal positional vertigo) 01/12/2018    had ENG at Dr. Jony Coffman office    Cataracts, bilateral     age related   [facility] CRI (chronic renal insufficiency)     stage 3    Diabetes mellitus, type 2 ([address] 2/2016    by HBA1c    Disease of inner ear     GERD (gastroesophageal reflux disease) 12/2/2011    Hypercholesterolemia     Hypertension     Hypothyroidism 12/2/2011       Review of Systems   Constitutional: Negative. Respiratory: Negative. Cardiovascular: Negative. Psychiatric/Behavioral: Positive for depression. Negative for suicidal ideas. The patient has insomnia. Objective: There were no vitals taken for this visit. General: alert, cooperative, no distress   Mental  status: normal mood, behavior, speech, dress, motor activity, and thought processes, able to follow commands   HENT: NCAT   Neck: no visualized mass   Resp: no respiratory distress   Neuro: no gross deficits   Skin: no discoloration or lesions of concern on visible areas   Psychiatric: normal affect, consistent with stated mood, no evidence of hallucinations. Her mood appears slightly better today. Face is less strained     Additional exam findings: We discussed the expected course, resolution and complications of the diagnosis(es) in detail. Medication risks, benefits, costs, interactions, and alternatives were discussed as indicated. I advised her to contact the office if her condition worsens, changes or fails to improve as anticipated. She expressed understanding with the diagnosis(es) and plan. Laura Bryan is a 75 y.o. female who was evaluated by a video visit encounter for concerns as above. Patient identification was verified prior to start of the visit. A caregiver was present when appropriate. Due to this being a TeleHealth encounter (During [facility]- public [facility] emergency), evaluation of the following organ systems was limited: Vitals/Constitutional/EENT/Resp/CV/GI//MS/Neuro/Skin/Heme-Lymph-Imm. Pursuant to the emergency declaration under the [facility][note 3][facility] waiver authority and the [employer] Act, this Virtual  Visit was conducted, with patient's (and/or legal guardian's) consent, to reduce the patient's risk of exposure to COVID-19 and provide necessary medical care.      Services were provided through a video synchronous discussion virtually to substitute for in-person clinic visit. Patient and provider were located at their individual homes.       Isatu Escalona MD

## 2020-09-10 DIAGNOSIS — Z76.0 MEDICATION REFILL: ICD-10-CM

## 2020-09-10 RX ORDER — SIMVASTATIN 40 MG/1
TABLET, FILM COATED ORAL
Qty: 90 TAB | Refills: 3 | Status: SHIPPED | OUTPATIENT
Start: 2020-09-10 | End: 2021-09-10

## 2020-09-11 DIAGNOSIS — Z76.0 MEDICATION REFILL: ICD-10-CM

## 2020-09-11 RX ORDER — METOPROLOL TARTRATE 50 MG/1
TABLET ORAL
Qty: 90 TAB | Refills: 4 | Status: SHIPPED | OUTPATIENT
Start: 2020-09-11 | End: 2021-09-16

## 2020-09-21 DIAGNOSIS — F32.89 OTHER DEPRESSION: ICD-10-CM

## 2020-09-21 RX ORDER — ESCITALOPRAM OXALATE 5 MG/1
5 TABLET ORAL DAILY
Qty: 90 TAB | Refills: 1 | Status: SHIPPED | OUTPATIENT
Start: 2020-09-21 | End: 2020-09-30

## 2020-09-21 RX ORDER — ESCITALOPRAM OXALATE 5 MG/1
5 TABLET ORAL DAILY
Qty: 30 TAB | Refills: 2 | Status: SHIPPED | OUTPATIENT
Start: 2020-09-21 | End: 2020-09-21 | Stop reason: SDUPTHER

## 2020-09-21 NOTE — TELEPHONE ENCOUNTER
Pharmacy fax request for a 90 day refill. Last OV 8/27/20, next scheduled 9/30/20. Requested Prescriptions     Pending Prescriptions Disp Refills    escitalopram oxalate (LEXAPRO) 5 mg tablet 30 Tab 2     Sig: Take 1 Tab by mouth daily.

## 2020-09-30 ENCOUNTER — OFFICE VISIT (OUTPATIENT)
Dept: INTERNAL MEDICINE CLINIC | Age: 75
End: 2020-09-30
Payer: MEDICARE

## 2020-09-30 VITALS
HEIGHT: 64 IN | SYSTOLIC BLOOD PRESSURE: 118 MMHG | RESPIRATION RATE: 18 BRPM | HEART RATE: 60 BPM | OXYGEN SATURATION: 98 % | WEIGHT: 184 LBS | DIASTOLIC BLOOD PRESSURE: 78 MMHG | TEMPERATURE: 97.6 F | BODY MASS INDEX: 31.41 KG/M2

## 2020-09-30 DIAGNOSIS — F43.21 GRIEF: Primary | ICD-10-CM

## 2020-09-30 DIAGNOSIS — F32.89 OTHER DEPRESSION: ICD-10-CM

## 2020-09-30 PROCEDURE — G8427 DOCREV CUR MEDS BY ELIG CLIN: HCPCS | Performed by: INTERNAL MEDICINE

## 2020-09-30 PROCEDURE — G8417 CALC BMI ABV UP PARAM F/U: HCPCS | Performed by: INTERNAL MEDICINE

## 2020-09-30 PROCEDURE — 1101F PT FALLS ASSESS-DOCD LE1/YR: CPT | Performed by: INTERNAL MEDICINE

## 2020-09-30 PROCEDURE — 99213 OFFICE O/P EST LOW 20 MIN: CPT | Performed by: INTERNAL MEDICINE

## 2020-09-30 PROCEDURE — G8432 DEP SCR NOT DOC, RNG: HCPCS | Performed by: INTERNAL MEDICINE

## 2020-09-30 PROCEDURE — G8399 PT W/DXA RESULTS DOCUMENT: HCPCS | Performed by: INTERNAL MEDICINE

## 2020-09-30 PROCEDURE — G8536 NO DOC ELDER MAL SCRN: HCPCS | Performed by: INTERNAL MEDICINE

## 2020-09-30 PROCEDURE — 1090F PRES/ABSN URINE INCON ASSESS: CPT | Performed by: INTERNAL MEDICINE

## 2020-09-30 RX ORDER — ESCITALOPRAM OXALATE 10 MG/1
10 TABLET ORAL DAILY
Qty: 30 TAB | Refills: 2 | Status: SHIPPED | OUTPATIENT
Start: 2020-09-30 | End: 2020-11-06 | Stop reason: SDUPTHER

## 2020-09-30 NOTE — PROGRESS NOTES
HISTORY OF PRESENT ILLNESS  Renetta Fairbanks is a 67 y.o. female. Visit Vitals  /78 (BP 1 Location: Right arm, BP Patient Position: Sitting)   Pulse 60   Temp 97.6 °F (36.4 °C) (Oral)   Resp 18   Ht 5' 4\" (1.626 m)   Wt 184 lb (83.5 kg)   SpO2 98%   BMI 31.58 kg/m²           She is still grieving over her 's death  Her wedding anniversary is coming up. The [employer] will be doing a ceremony soon in his honor    She feels the lexapro is \"OK\" at this dose. But some days are harder than  Sleeping a little better on the lexapro    Appetite is so/so. Has lost 4 # since June. Total of 7 in the last year. Depression   The history is provided by the patient. This is a chronic problem. The current episode started more than 1 week ago. The problem occurs daily. The problem has been gradually improving. Review of Systems   Psychiatric/Behavioral: Positive for depression. Physical Exam  Vitals signs and nursing note reviewed. Constitutional:       Appearance: Normal appearance. She is well-developed. Cardiovascular:      Rate and Rhythm: Normal rate and regular rhythm. Pulmonary:      Effort: Pulmonary effort is normal.      Breath sounds: Normal breath sounds. Skin:     General: Skin is warm and dry. Neurological:      General: No focal deficit present. Mental Status: She is alert and oriented to person, place, and time. Psychiatric:         Mood and Affect: Mood normal.         Behavior: Behavior normal.         ASSESSMENT and PLAN    ICD-10-CM ICD-9-CM    1. Grief  F43.21 309.0    2. Other depression  F32.89 311        She talked about her 's passing. How stressful it was. How ill he was. How her children were able to be there. She is still not yet able to accept his passing.  But this will come with time    She decided to go ahead and go up on the dose to 10 mg lexapro    F/u 6 weeks for reheck

## 2020-09-30 NOTE — PROGRESS NOTES
ROOM # 4    Brittni Kumar presents today for   Chief Complaint   Patient presents with    Depression       Brittni Kumar preferred language for health care discussion is english/other. Is someone accompanying this pt? no    Is the patient using any DME equipment during [address]?  cane    Depression Screening:  3 most recent PHQ Screens 8/27/2020 6/2/2020 1/29/2019 11/7/2018 12/27/2017 12/5/2017 11/13/2017   PHQ Not Done (No Data) - - - - - -   Little interest or pleasure in doing things More than half the days Not at all Not at all Not at all Not at all Not at all More than half the days   Feeling down, depressed, irritable, or hopeless More than half the days Not at all Not at all Not at all Not at all Not at all More than half the days   Total Score PHQ 2 4 0 0 0 0 0 4   Trouble falling or staying asleep, or sleeping too much - - - Not at all - - -   Feeling tired or having little energy - - - Several days - - -   Poor appetite, weight loss, or overeating - - - Not at all - - -   Feeling bad about yourself - or that you are a failure or have let yourself or your family down - - - Several days - - -   Trouble concentrating on things such as school, work, reading, or watching TV - - - Not at all - - -   Moving or speaking so slowly that other people could have noticed; or the opposite being so fidgety that others notice - - - Nearly every day - - -   Thoughts of being better off dead, or hurting yourself in some way - - - Not at all - - -   PHQ 9 Score - - - 5 - - -       Learning Assessment:  Learning Assessment 5/19/2015   PRIMARY LEARNER Patient   HIGHEST LEVEL OF EDUCATION - PRIMARY LEARNER  > 4 YEARS OF COLLEGE   BARRIERS PRIMARY LEARNER NONE   CO-LEARNER CAREGIVER No   PRIMARY LANGUAGE ENGLISH   LEARNER PREFERENCE PRIMARY READING     DEMONSTRATION   ANSWERED BY Patient   RELATIONSHIP SELF       Abuse Screening:  Abuse Screening Questionnaire 6/2/2020 11/13/2017 5/19/2015   Do you ever feel afraid of your partner? N N N   Are you in a relationship with someone who physically or mentally threatens you? N N N   Is it safe for you to go home? Batsheva Luu       Fall Risk  Fall Risk Assessment, last 12 mths 8/27/2020 6/2/2020 8/1/2019 4/8/2019 1/29/2019 11/7/2018 6/19/2018   Able to walk? Yes Yes Yes Yes Yes Yes Yes   Fall in past 12 months? No Yes Yes Yes No No No   Fall with injury? - No No Yes - - -   Number of falls in past 12 months - 2 3 1 - - -   Fall Risk Score - 2 3 2 - - -           Health Maintenance reviewed and discussed per provider. Yes    Yves Burnett is due for   Health Maintenance Due   Topic Date Due    Flu Vaccine (1) 09/01/2020     Please order/place referral if appropriate. Advance Directive:  1. Do you have an advance directive in place? Patient Reply: no    2. If not, would you like material regarding how to put one in place? Patient Reply: no    Coordination of Care:  1. Have you been to the ER, urgent care clinic since your last visit? Hospitalized since your last visit? no    2. Have you seen or consulted any other health care providers outside of the [address] [place] [address] since your last visit? Include any pap smears or colon screening.  Derm

## 2020-10-17 DIAGNOSIS — Z76.0 MEDICATION REFILL: ICD-10-CM

## 2020-10-18 RX ORDER — SPIRONOLACTONE 50 MG/1
TABLET, FILM COATED ORAL
Qty: 90 TAB | Refills: 3 | Status: SHIPPED | OUTPATIENT
Start: 2020-10-18 | End: 2021-09-24

## 2020-11-06 DIAGNOSIS — F32.89 OTHER DEPRESSION: ICD-10-CM

## 2020-11-06 DIAGNOSIS — F43.21 GRIEF: ICD-10-CM

## 2020-11-06 RX ORDER — ESCITALOPRAM OXALATE 10 MG/1
10 TABLET ORAL DAILY
Qty: 30 TAB | Refills: 2 | Status: SHIPPED | OUTPATIENT
Start: 2020-11-06 | End: 2021-01-06 | Stop reason: SDUPTHER

## 2020-11-06 NOTE — TELEPHONE ENCOUNTER
Pharmacy fax request for a new 90 day Rx. Last OV 9/30/20, next scheduled 11/13/20. Requested Prescriptions     Pending Prescriptions Disp Refills    escitalopram oxalate (LEXAPRO) 10 mg tablet 30 Tab 2     Sig: Take 1 Tab by mouth daily.  Indications: grief and depression

## 2020-11-13 ENCOUNTER — OFFICE VISIT (OUTPATIENT)
Dept: INTERNAL MEDICINE CLINIC | Age: 75
End: 2020-11-13
Payer: MEDICARE

## 2020-11-13 VITALS
OXYGEN SATURATION: 96 % | SYSTOLIC BLOOD PRESSURE: 133 MMHG | WEIGHT: 184 LBS | RESPIRATION RATE: 20 BRPM | DIASTOLIC BLOOD PRESSURE: 71 MMHG | BODY MASS INDEX: 31.41 KG/M2 | HEIGHT: 64 IN | TEMPERATURE: 97.9 F | HEART RATE: 59 BPM

## 2020-11-13 DIAGNOSIS — F32.89 OTHER DEPRESSION: ICD-10-CM

## 2020-11-13 DIAGNOSIS — Z23 ENCOUNTER FOR IMMUNIZATION: ICD-10-CM

## 2020-11-13 DIAGNOSIS — E11.21 TYPE 2 DIABETES MELLITUS WITH NEPHROPATHY (HCC): Chronic | ICD-10-CM

## 2020-11-13 DIAGNOSIS — F43.21 GRIEF: Primary | ICD-10-CM

## 2020-11-13 DIAGNOSIS — E78.00 HYPERCHOLESTEROLEMIA: Chronic | ICD-10-CM

## 2020-11-13 DIAGNOSIS — E03.9 HYPOTHYROIDISM, UNSPECIFIED TYPE: ICD-10-CM

## 2020-11-13 DIAGNOSIS — N18.30 STAGE 3 CHRONIC KIDNEY DISEASE, UNSPECIFIED WHETHER STAGE 3A OR 3B CKD (HCC): ICD-10-CM

## 2020-11-13 PROCEDURE — 90750 HZV VACC RECOMBINANT IM: CPT | Performed by: INTERNAL MEDICINE

## 2020-11-13 PROCEDURE — G8536 NO DOC ELDER MAL SCRN: HCPCS | Performed by: INTERNAL MEDICINE

## 2020-11-13 PROCEDURE — 99214 OFFICE O/P EST MOD 30 MIN: CPT | Performed by: INTERNAL MEDICINE

## 2020-11-13 PROCEDURE — G8427 DOCREV CUR MEDS BY ELIG CLIN: HCPCS | Performed by: INTERNAL MEDICINE

## 2020-11-13 PROCEDURE — G8417 CALC BMI ABV UP PARAM F/U: HCPCS | Performed by: INTERNAL MEDICINE

## 2020-11-13 PROCEDURE — G8432 DEP SCR NOT DOC, RNG: HCPCS | Performed by: INTERNAL MEDICINE

## 2020-11-13 PROCEDURE — G8399 PT W/DXA RESULTS DOCUMENT: HCPCS | Performed by: INTERNAL MEDICINE

## 2020-11-13 PROCEDURE — 1101F PT FALLS ASSESS-DOCD LE1/YR: CPT | Performed by: INTERNAL MEDICINE

## 2020-11-13 PROCEDURE — 1090F PRES/ABSN URINE INCON ASSESS: CPT | Performed by: INTERNAL MEDICINE

## 2020-11-13 NOTE — PROGRESS NOTES
ROOM # 5    Tobias Landers presents today for   Chief Complaint   Patient presents with    Depression    Bereavement Counseling       Tobias Landers preferred language for health care discussion is english/other.     Is someone accompanying this pt? no    Is the patient using any DME equipment during [address]? no    Depression Screening:  3 most recent PHQ Screens 9/30/2020 8/27/2020 6/2/2020 1/29/2019 11/7/2018 12/27/2017 12/5/2017   PHQ Not Done Active Diagnosis of Depression or Bipolar Disorder (No Data) - - - - -   Little interest or pleasure in doing things - More than half the days Not at all Not at all Not at all Not at all Not at all   Feeling down, depressed, irritable, or hopeless - More than half the days Not at all Not at all Not at all Not at all Not at all   Total Score PHQ 2 - 4 0 0 0 0 0   Trouble falling or staying asleep, or sleeping too much - - - - Not at all - -   Feeling tired or having little energy - - - - Several days - -   Poor appetite, weight loss, or overeating - - - - Not at all - -   Feeling bad about yourself - or that you are a failure or have let yourself or your family down - - - - Several days - -   Trouble concentrating on things such as school, work, reading, or watching TV - - - - Not at all - -   Moving or speaking so slowly that other people could have noticed; or the opposite being so fidgety that others notice - - - - Nearly every day - -   Thoughts of being better off dead, or hurting yourself in some way - - - - Not at all - -   PHQ 9 Score - - - - 5 - -       Learning Assessment:  Learning Assessment 5/19/2015   PRIMARY LEARNER Patient   HIGHEST LEVEL OF EDUCATION - PRIMARY LEARNER  > 4 YEARS OF COLLEGE   BARRIERS PRIMARY LEARNER NONE   CO-LEARNER CAREGIVER No   PRIMARY LANGUAGE ENGLISH   LEARNER PREFERENCE PRIMARY READING     DEMONSTRATION   ANSWERED BY Patient   RELATIONSHIP SELF       Abuse Screening:  Abuse Screening Questionnaire 6/2/2020 11/13/2017 5/19/2015   Do you ever feel afraid of your partner? N N N   Are you in a relationship with someone who physically or mentally threatens you? N N N   Is it safe for you to go home? Tom Vieyra       Fall Risk  Fall Risk Assessment, last 12 mths 8/27/2020 6/2/2020 8/1/2019 4/8/2019 1/29/2019 11/7/2018 6/19/2018   Able to walk? Yes Yes Yes Yes Yes Yes Yes   Fall in past 12 months? No Yes Yes Yes No No No   Fall with injury? - No No Yes - - -   Number of falls in past 12 months - 2 3 1 - - -   Fall Risk Score - 2 3 2 - - -           Health Maintenance reviewed and discussed per provider. Yes    Leo Fair is due for There are no preventive care reminders to display for this patient. Please order/place referral if appropriate. Advance Directive:  1. Do you have an advance directive in place? Patient Reply: no    2. If not, would you like material regarding how to put one in place? Patient Reply: no    Coordination of Care:  1. Have you been to the ER, urgent care clinic since your last visit? Hospitalized since your last visit? no    2. Have you seen or consulted any other health care providers outside of the [address] [place] [address] since your last visit? Include any pap smears or colon screening.  no

## 2020-11-13 NOTE — PROGRESS NOTES
HISTORY OF PRESENT ILLNESS  Corie Fairbanks is a 67 y.o. female. Visit Vitals  /71 (BP 1 Location: Right arm, BP Patient Position: Sitting)   Pulse (!) 59   Temp 97.9 °F (36.6 °C) (Temporal)   Resp 20   Ht 5' 4\" (1.626 m)   Wt 184 lb (83.5 kg)   SpO2 96%   BMI 31.58 kg/m²       Still in a lot of grief over 's passing    She recently has been crying more, especially after reading her 's letters to her when they were courting. Holidays are coming up    Depression   The history is provided by the patient. This is a chronic problem. The current episode started more than 1 week ago. The problem occurs daily. The problem has not changed since onset. Pertinent negatives include no chest pain and no shortness of breath. Review of Systems   Constitutional: Negative for chills and fever. HENT: Negative for congestion and sore throat. Respiratory: Negative for cough and shortness of breath. Cardiovascular: Negative for chest pain and palpitations. Psychiatric/Behavioral: Positive for depression. Negative for suicidal ideas. The patient is not nervous/anxious and does not have insomnia. Physical Exam  Vitals signs and nursing note reviewed. Constitutional:       Appearance: Normal appearance. She is well-developed. HENT:      Head: Normocephalic and atraumatic. Cardiovascular:      Rate and Rhythm: Normal rate and regular rhythm. Pulmonary:      Effort: Pulmonary effort is normal.      Breath sounds: Normal breath sounds. Skin:     General: Skin is warm and dry. Neurological:      General: No focal deficit present. Mental Status: She is alert and oriented to person, place, and time. Psychiatric:         Attention and Perception: Attention and perception normal.         Mood and Affect: Mood is depressed.          Speech: Speech normal.         Behavior: Behavior normal.      Comments: Tearful today         ASSESSMENT and PLAN    ICD-10-CM ICD-9-CM    1. Grief  F43.21 309.0    2. Other depression  F32.89 311    3. Type 2 diabetes mellitus with nephropathy (HCC)  N91.72 702.92 METABOLIC PANEL, COMPREHENSIVE     583.81 LIPID PANEL      HEMOGLOBIN A1C W/O EAG   4. Hypercholesterolemia  E78.00 272.0 LIPID PANEL   5. Stage 3 chronic kidney disease, unspecified whether stage 3a or 3b CKD  V29.32 335.2 METABOLIC PANEL, COMPREHENSIVE   6. Hypothyroidism, unspecified type  E03.9 244.9 TSH AND FREE T4   7.  Encounter for immunization  Z23 V03.89 ZOSTER VACC RECOMBINANT ADJUVANTED       Doing so/so with her grief  Names of some grief counsellors provided    Update lab so we co not have to have her come out in the winter (due to [address])    F/u 6 weeks--virtual appt

## 2020-11-13 NOTE — PROGRESS NOTES
Shingles immunization administered right deltoid 11/13/2020 by Elier Chance LPN with pt's consent. Patient tolerated procedure well. No reactions noted.

## 2020-11-18 ENCOUNTER — HOSPITAL ENCOUNTER (OUTPATIENT)
Dept: LAB | Age: 75
Discharge: HOME | End: 2020-11-18
Payer: MEDICARE

## 2020-11-18 DIAGNOSIS — E11.21 TYPE 2 DIABETES MELLITUS WITH NEPHROPATHY (HCC): Chronic | ICD-10-CM

## 2020-11-18 DIAGNOSIS — E03.9 HYPOTHYROIDISM, UNSPECIFIED TYPE: ICD-10-CM

## 2020-11-18 DIAGNOSIS — E78.00 HYPERCHOLESTEROLEMIA: Chronic | ICD-10-CM

## 2020-11-18 DIAGNOSIS — N18.30 STAGE 3 CHRONIC KIDNEY DISEASE, UNSPECIFIED WHETHER STAGE 3A OR 3B CKD (HCC): ICD-10-CM

## 2020-11-18 LAB
ALBUMIN SERPL-MCNC: 3.7 G/DL (ref 3.4–5)
ALBUMIN/GLOB SERPL: 1.1 {RATIO} (ref 0.8–1.7)
ALP SERPL-CCNC: 58 U/L (ref 45–117)
ALT SERPL-CCNC: 14 U/L (ref 13–56)
ANION GAP SERPL CALC-SCNC: 5 MMOL/L (ref 3–18)
AST SERPL-CCNC: 15 U/L (ref 10–38)
BILIRUB SERPL-MCNC: 0.5 MG/DL (ref 0.2–1)
BUN SERPL-MCNC: 10 MG/DL (ref 7–18)
BUN/CREAT SERPL: 9 (ref 12–20)
CALCIUM SERPL-MCNC: 9.3 MG/DL (ref 8.5–10.1)
CHLORIDE SERPL-SCNC: 108 MMOL/L (ref 100–111)
CHOLEST SERPL-MCNC: 154 MG/DL
CO2 SERPL-SCNC: 28 MMOL/L (ref 21–32)
CREAT SERPL-MCNC: 1.16 MG/DL (ref 0.6–1.3)
GLOBULIN SER CALC-MCNC: 3.3 G/DL (ref 2–4)
GLUCOSE SERPL-MCNC: 72 MG/DL (ref 74–99)
HBA1C MFR BLD: 6 % (ref 4.2–5.6)
HDLC SERPL-MCNC: 43 MG/DL (ref 40–60)
HDLC SERPL: 3.6 {RATIO} (ref 0–5)
LDLC SERPL CALC-MCNC: 91.2 MG/DL (ref 0–100)
LIPID PROFILE,FLP: NORMAL
POTASSIUM SERPL-SCNC: 4.2 MMOL/L (ref 3.5–5.5)
PROT SERPL-MCNC: 7 G/DL (ref 6.4–8.2)
SODIUM SERPL-SCNC: 141 MMOL/L (ref 136–145)
T4 FREE SERPL-MCNC: 1.4 NG/DL (ref 0.7–1.5)
TRIGL SERPL-MCNC: 99 MG/DL (ref ?–150)
TSH SERPL DL<=0.05 MIU/L-ACNC: 0.23 UIU/ML (ref 0.36–3.74)
VLDLC SERPL CALC-MCNC: 19.8 MG/DL

## 2020-11-18 PROCEDURE — 36415 COLL VENOUS BLD VENIPUNCTURE: CPT

## 2020-11-18 PROCEDURE — 80061 LIPID PANEL: CPT

## 2020-11-18 PROCEDURE — 83036 HEMOGLOBIN GLYCOSYLATED A1C: CPT

## 2020-11-18 PROCEDURE — 80053 COMPREHEN METABOLIC PANEL: CPT

## 2020-11-18 PROCEDURE — 84439 ASSAY OF FREE THYROXINE: CPT

## 2020-12-23 ENCOUNTER — VIRTUAL VISIT (OUTPATIENT)
Dept: INTERNAL MEDICINE CLINIC | Age: 75
End: 2020-12-23
Payer: MEDICARE

## 2020-12-23 DIAGNOSIS — F43.21 GRIEF: Primary | ICD-10-CM

## 2020-12-23 DIAGNOSIS — F32.89 OTHER DEPRESSION: ICD-10-CM

## 2020-12-23 PROCEDURE — G8427 DOCREV CUR MEDS BY ELIG CLIN: HCPCS | Performed by: INTERNAL MEDICINE

## 2020-12-23 PROCEDURE — G8432 DEP SCR NOT DOC, RNG: HCPCS | Performed by: INTERNAL MEDICINE

## 2020-12-23 PROCEDURE — 99213 OFFICE O/P EST LOW 20 MIN: CPT | Performed by: INTERNAL MEDICINE

## 2020-12-23 PROCEDURE — G8399 PT W/DXA RESULTS DOCUMENT: HCPCS | Performed by: INTERNAL MEDICINE

## 2020-12-23 PROCEDURE — 1101F PT FALLS ASSESS-DOCD LE1/YR: CPT | Performed by: INTERNAL MEDICINE

## 2020-12-23 PROCEDURE — 1090F PRES/ABSN URINE INCON ASSESS: CPT | Performed by: INTERNAL MEDICINE

## 2020-12-23 NOTE — PROGRESS NOTES
Lisa Cheng is a 67 y.o. female who was seen by synchronous (real-time) audio-video technology on 12/23/2020. Consent: Lisa Cheng, who was seen by synchronous (real-time) audio-video technology, and/or her healthcare decision maker, is aware that this patient-initiated, Telehealth encounter on 12/23/2020 is a billable service, with coverage as determined by her insurance carrier. She is aware that she may receive a bill and has provided verbal consent to proceed: Yes. Assessment & Plan:   Diagnoses and all orders for this visit:    1. Grief    2. Other depression      Will continue same meds  She is finding some interest in things she used to enjoy and says she feels she is making progress. I advised her in general it takes about 2 years to get over the death of a spouse    It is OK to be sad and tearful this holiday season. It is normal to feel a little resentment that others are so happy and excited right now when she is sad. This will pass. F/u 5-6 weeks. May be virtual      712  Subjective:   Lisa Cheng is a 67 y.o. female who was seen for Bereavement Counseling and Depression    Crying a bit more. Due to the holidays. Feels overall a little better. Finding some interest in her piano now. Knitting and reading from [employer]. Depression  The history is provided by the patient (due to grief over losing her spouse of many years). This is a chronic problem. The current episode started more than 1 week ago. The problem occurs daily. The problem has been gradually improving. The symptoms are relieved by medications (and time and family support). Treatments tried: lexapro. The treatment provided mild relief. Prior to Admission medications    Medication Sig Start Date End Date Taking? Authorizing Provider   escitalopram oxalate (LEXAPRO) 10 mg tablet Take 1 Tab by mouth daily.  Indications: grief and depression 11/6/20  Yes Tammi Ortiz MD   spironolactone (ALDACTONE) 50 mg tablet TAKE 1 TABLET BY MOUTH EVERY DAY 10/18/20  Yes Mercedes Martins MD   metoprolol tartrate (LOPRESSOR) 50 mg tablet TAKE 1 TABLET BY MOUTH EVERY DAY 9/11/20  Yes Mercedes Martins MD   simvastatin (ZOCOR) 40 mg tablet TAKE 1 TABLET BY MOUTH NIGHTLY 9/10/20  Yes Mercedes Martins MD   SYNTHROID 100 mcg tablet TAKE 1 TABLET BY MOUTH ONCE DAILY FOR HYPOTHYROIDISM 1/3/20  Yes Saroj Maya MD   albuterol (PROVENTIL HFA, VENTOLIN HFA, PROAIR HFA) 90 mcg/actuation inhaler Take 1 Puff by inhalation every four (4) hours as needed for Wheezing. 9/20/19  Yes Saroj Maya MD   ketoconazole (NIZORAL) 2 % topical cream APPLY A THIN LAYER TO AFFECTED AREA TWICE A DAY 1/16/19  Yes Provider, Historical   hydrocortisone (HYTONE) 2.5 % ointment APPLY TO AFFECTED AREAS DAILY AS DIRECTED WITH KETOCONAZOLE CREAM 1/16/19  Yes Provider, Historical   ammonium lactate (LAC-HYDRIN) 12 % lotion Use 1 Application to affected area Take As Needed. 11/16/18  Yes Provider, Historical   loratadine (CLARITIN) 10 mg tablet Take 1 Tab by mouth daily. 1/29/19  Yes Saroj Maya MD   ergocalciferol (ERGOCALCIFEROL) 50,000 unit capsule Take 1 Cap by mouth every seven (7) days. Indications: VITAMIN D DEFICIENCY (HIGH DOSE THERAPY)  Patient taking differently: Take 50,000 Units by mouth every month. 6/19/18  Yes Mercedes Martins MD   diclofenac (VOLTAREN) 1 % gel APPLY QUARTER SIZED AMOUNT TO AFFECTED AREA(S) 3 TIMES DAILY 9/13/17  Yes Provider, Historical   BABY ASPIRIN PO Take 81 mg by mouth.    Yes Provider, Historical     Allergies   Allergen Reactions    Amoxicillin Rash    Ampicillin Rash    Codeine Nausea and Vomiting    Tussionex Other (comments)     Hallucinations    Valium [Diazepam] Vertigo       Patient Active Problem List    Diagnosis Date Noted    CKD (chronic kidney disease) stage 3, GFR 30-59 ml/min 06/02/2020    Vitamin D deficiency 06/19/2018    Vertigo 02/20/2018    Type 2 diabetes mellitus with nephropathy ([facility] [address] 12/27/2017    Hypercholesterolemia     Post concussion syndrome 12/02/2011    HCVD (hypertensive cardiovascular disease) 12/02/2011    GERD (gastroesophageal reflux disease) 12/02/2011    Hypothyroidism 12/02/2011     Past Medical History:   Diagnosis Date    Benign neoplasm of choroid, right     BPPV (benign paroxysmal positional vertigo) 01/12/2018    had ENG at Dr. Maria Ines Costa office    Cataracts, bilateral     age related   [facility] CRI (chronic renal insufficiency)     stage 3    Diabetes mellitus, type 2 ([facility] [address] 2/2016    by HBA1c    Disease of inner ear     GERD (gastroesophageal reflux disease) 12/2/2011    Hypercholesterolemia     Hypertension     Hypothyroidism 12/2/2011       Review of Systems   Psychiatric/Behavioral: Positive for depression. Objective: There were no vitals taken for this visit. General: alert, cooperative, no distress   Mental  status: normal mood, behavior, speech, dress, motor activity, and thought processes, able to follow commands   HENT: NCAT   Neck: no visualized mass   Resp: no respiratory distress   Neuro: no gross deficits   Skin: no discoloration or lesions of concern on visible areas   Psychiatric: normal affect, consistent with stated mood, no evidence of hallucinations     Additional exam findings: We discussed the expected course, resolution and complications of the diagnosis(es) in detail. Medication risks, benefits, costs, interactions, and alternatives were discussed as indicated. I advised her to contact the office if her condition worsens, changes or fails to improve as anticipated. She expressed understanding with the diagnosis(es) and plan. Donte Sanchez is a 67 y.o. female who was evaluated by a video visit encounter for concerns as above. Patient identification was verified prior to start of the visit. A caregiver was present when appropriate.  Due to this being a TeleHealth encounter (During PFYFE-76 public health emergency), evaluation of the following organ systems was limited: Vitals/Constitutional/EENT/Resp/CV/GI//MS/Neuro/Skin/Heme-Lymph-Imm. Pursuant to the emergency declaration under the [facility], [facility] waiver authority and the [employer] Act, this Virtual  Visit was conducted, with patient's (and/or legal guardian's) consent, to reduce the patient's risk of exposure to COVID-19 and provide necessary medical care. Services were provided through a video synchronous discussion virtually to substitute for in-person clinic visit. Patient and provider were located at their individual homes.       Mikayla Zhao MD

## 2020-12-23 NOTE — PROGRESS NOTES
ROOM # Virtual Visit    Corie Fairbanks presents today for   Chief Complaint   Patient presents with    Bereavement Counseling    Depression       Corie Fairbanks preferred language for health care discussion is english/other.     Is someone accompanying this pt? no    Is the patient using any DME equipment during OV? no    Depression Screening:  3 most recent PHQ Screens 9/30/2020 8/27/2020 6/2/2020 1/29/2019 11/7/2018 12/27/2017 12/5/2017   PHQ Not Done Active Diagnosis of Depression or Bipolar Disorder (No Data) - - - - -   Little interest or pleasure in doing things - More than half the days Not at all Not at all Not at all Not at all Not at all   Feeling down, depressed, irritable, or hopeless - More than half the days Not at all Not at all Not at all Not at all Not at all   Total Score PHQ 2 - 4 0 0 0 0 0   Trouble falling or staying asleep, or sleeping too much - - - - Not at all - -   Feeling tired or having little energy - - - - Several days - -   Poor appetite, weight loss, or overeating - - - - Not at all - -   Feeling bad about yourself - or that you are a failure or have let yourself or your family down - - - - Several days - -   Trouble concentrating on things such as school, work, reading, or watching TV - - - - Not at all - -   Moving or speaking so slowly that other people could have noticed; or the opposite being so fidgety that others notice - - - - Nearly every day - -   Thoughts of being better off dead, or hurting yourself in some way - - - - Not at all - -   PHQ 9 Score - - - - 5 - -       Learning Assessment:  Learning Assessment 5/19/2015   PRIMARY LEARNER Patient   HIGHEST LEVEL OF EDUCATION - PRIMARY LEARNER  > 4 YEARS OF COLLEGE   BARRIERS PRIMARY LEARNER NONE   CO-LEARNER CAREGIVER No   PRIMARY LANGUAGE ENGLISH   LEARNER PREFERENCE PRIMARY READING     DEMONSTRATION   ANSWERED BY Patient   RELATIONSHIP SELF       Abuse Screening:  Abuse Screening Questionnaire 6/2/2020 11/13/2017 5/19/2015 Do you ever feel afraid of your partner? N N N   Are you in a relationship with someone who physically or mentally threatens you? N N N   Is it safe for you to go home? Rose Pelayo       Fall Risk  Fall Risk Assessment, last 12 mths 8/27/2020 6/2/2020 8/1/2019 4/8/2019 1/29/2019 11/7/2018 6/19/2018   Able to walk? Yes Yes Yes Yes Yes Yes Yes   Fall in past 12 months? No Yes Yes Yes No No No   Number of falls in past 12 months - 2 3 1 - - -   Fall with injury? - 0 0 1 - - -           Health Maintenance reviewed and discussed per provider. Yes    Julian Oliveira is due for There are no preventive care reminders to display for this patient. Please order/place referral if appropriate. Advance Directive:  1. Do you have an advance directive in place? Patient Reply: no    2. If not, would you like material regarding how to put one in place? Patient Reply: no    Coordination of Care:  1. Have you been to the ER, urgent care clinic since your last visit? Hospitalized since your last visit? no    2. Have you seen or consulted any other health care providers outside of the [address] [place] [address] since your last visit? Include any pap smears or colon screening.  no

## 2021-01-06 DIAGNOSIS — F43.21 GRIEF: ICD-10-CM

## 2021-01-06 DIAGNOSIS — Z76.0 MEDICATION REFILL: ICD-10-CM

## 2021-01-06 DIAGNOSIS — F32.89 OTHER DEPRESSION: ICD-10-CM

## 2021-01-06 RX ORDER — ESCITALOPRAM OXALATE 10 MG/1
10 TABLET ORAL DAILY
Qty: 90 TAB | Refills: 2 | Status: SHIPPED | OUTPATIENT
Start: 2021-01-06 | End: 2021-10-12

## 2021-01-06 NOTE — TELEPHONE ENCOUNTER
Pharmacy fax request for a new 90 day Rx. Last OV 11/23/20, next scheduled 2/4/21. Requested Prescriptions     Pending Prescriptions Disp Refills    escitalopram oxalate (LEXAPRO) 10 mg tablet 30 Tab 2     Sig: Take 1 Tab by mouth daily.  Indications: grief and depression

## 2021-02-04 ENCOUNTER — VIRTUAL VISIT (OUTPATIENT)
Dept: INTERNAL MEDICINE CLINIC | Age: 76
End: 2021-02-04
Payer: MEDICARE

## 2021-02-04 DIAGNOSIS — F32.89 OTHER DEPRESSION: ICD-10-CM

## 2021-02-04 DIAGNOSIS — Z76.0 MEDICATION REFILL: ICD-10-CM

## 2021-02-04 DIAGNOSIS — F43.21 GRIEF: Primary | ICD-10-CM

## 2021-02-04 DIAGNOSIS — E11.21 TYPE 2 DIABETES MELLITUS WITH NEPHROPATHY (HCC): ICD-10-CM

## 2021-02-04 PROCEDURE — G8399 PT W/DXA RESULTS DOCUMENT: HCPCS | Performed by: INTERNAL MEDICINE

## 2021-02-04 PROCEDURE — G8510 SCR DEP NEG, NO PLAN REQD: HCPCS | Performed by: INTERNAL MEDICINE

## 2021-02-04 PROCEDURE — 99213 OFFICE O/P EST LOW 20 MIN: CPT | Performed by: INTERNAL MEDICINE

## 2021-02-04 PROCEDURE — 1090F PRES/ABSN URINE INCON ASSESS: CPT | Performed by: INTERNAL MEDICINE

## 2021-02-04 PROCEDURE — G8427 DOCREV CUR MEDS BY ELIG CLIN: HCPCS | Performed by: INTERNAL MEDICINE

## 2021-02-04 PROCEDURE — 1101F PT FALLS ASSESS-DOCD LE1/YR: CPT | Performed by: INTERNAL MEDICINE

## 2021-02-04 RX ORDER — ERGOCALCIFEROL 1.25 MG/1
50000 CAPSULE ORAL
Qty: 12 CAP | Refills: 3 | Status: SHIPPED | OUTPATIENT
Start: 2021-02-04 | End: 2021-02-04 | Stop reason: SDUPTHER

## 2021-02-04 RX ORDER — ERGOCALCIFEROL 1.25 MG/1
50000 CAPSULE ORAL
Qty: 3 CAP | Refills: 3 | Status: SHIPPED | OUTPATIENT
Start: 2021-02-04 | End: 2022-03-10

## 2021-02-04 NOTE — PROGRESS NOTES
ROOM # Virtual Visit    Rowena Martin presents today for   Chief Complaint   Patient presents with    Depression    Bereavement Counseling       Rowena Martin preferred language for health care discussion is english/other.     Is someone accompanying this pt? no    Is the patient using any DME equipment during [address]? no    Depression Screening:  3 most recent PHQ Screens 2/4/2021 9/30/2020 8/27/2020 6/2/2020 1/29/2019 11/7/2018 12/27/2017   PHQ Not Done - Active Diagnosis of Depression or Bipolar Disorder (No Data) - - - -   Little interest or pleasure in doing things Not at all - More than half the days Not at all Not at all Not at all Not at all   Feeling down, depressed, irritable, or hopeless More than half the days - More than half the days Not at all Not at all Not at all Not at all   Total Score PHQ 2 2 - 4 0 0 0 0   Trouble falling or staying asleep, or sleeping too much - - - - - Not at all -   Feeling tired or having little energy - - - - - Several days -   Poor appetite, weight loss, or overeating - - - - - Not at all -   Feeling bad about yourself - or that you are a failure or have let yourself or your family down - - - - - Several days -   Trouble concentrating on things such as school, work, reading, or watching TV - - - - - Not at all -   Moving or speaking so slowly that other people could have noticed; or the opposite being so fidgety that others notice - - - - - Nearly every day -   Thoughts of being better off dead, or hurting yourself in some way - - - - - Not at all -   PHQ 9 Score - - - - - 5 -       Learning Assessment:  Learning Assessment 5/19/2015   PRIMARY LEARNER Patient   HIGHEST LEVEL OF EDUCATION - PRIMARY LEARNER  > 4 YEARS OF COLLEGE   BARRIERS PRIMARY LEARNER NONE   CO-LEARNER CAREGIVER No   PRIMARY LANGUAGE ENGLISH   LEARNER PREFERENCE PRIMARY READING     DEMONSTRATION   ANSWERED BY Patient   RELATIONSHIP SELF       Abuse Screening:  Abuse Screening Questionnaire 6/2/2020 11/13/2017 5/19/2015   Do you ever feel afraid of your partner? N N N   Are you in a relationship with someone who physically or mentally threatens you? N N N   Is it safe for you to go home? [place]       Fall Risk  Fall Risk Assessment, last 12 mths 8/27/2020 6/2/2020 8/1/2019 4/8/2019 1/29/2019 11/7/2018 6/19/2018   Able to walk? Yes Yes Yes Yes Yes Yes Yes   Fall in past 12 months? No Yes Yes Yes No No No   Number of falls in past 12 months - 2 3 1 - - -   Fall with injury? - 0 0 1 - - -           Health Maintenance reviewed and discussed per provider. Yes    Jessica Bocanegra is due for   Health Maintenance Due   Topic Date Due    COVID-19 Vaccine (1 of 2) 09/15/1961    Shingrix Vaccine Age 50> (2 of 2) 01/08/2021     Please order/place referral if appropriate. Advance Directive:  1. Do you have an advance directive in place? Patient Reply: no    2. If not, would you like material regarding how to put one in place? Patient Reply: no    Coordination of Care:  1. Have you been to the ER, urgent care clinic since your last visit? Hospitalized since your last visit? no    2. Have you seen or consulted any other health care providers outside of the [address] [place] [address] since your last visit? Include any pap smears or colon screening.  ENT

## 2021-02-04 NOTE — PROGRESS NOTES
Andre Carlin is a 67 y.o. female who was seen by synchronous (real-time) audio-video technology on 2/4/2021. Consent: Andre Carlin, who was seen by synchronous (real-time) audio-video technology, and/or her healthcare decision maker, is aware that this patient-initiated, Telehealth encounter on 2/4/2021 is a billable service, with coverage as determined by her insurance carrier. She is aware that she may receive a bill and has provided verbal consent to proceed: Yes. Assessment & Plan:   Diagnoses and all orders for this visit:    1. Grief    2. Other depression    3. Type 2 diabetes mellitus with nephropathy ([facility] [address]    4. Medication refill  -     ergocalciferol (ERGOCALCIFEROL) 1,250 mcg (50,000 unit) capsule; Take 1 Cap by mouth every thirty (30) days. Indications: vitamin D deficiency (high dose therapy)      Pt slowly improving on current routine. Continue current routine    She will be getting her first COVID vaccine tomorrow via [facility]. F/u here in office 2 months (if COVID still rampant, can convert to virtual)      712  Subjective:   Andre Carlin is a 67 y.o. female who was seen for Depression and Bereavement Counseling    More ups than downs  Slow progress    Kids check on her a lot. She is getting out more  Thinks the lexapro is helping      A week ago she had some very severe leg cramps. She started to feel ill and she vomited. Felt hot. She may have overeaten. She had a lot of sweets. After she threw up she felt much better. No fever or chills. Depression  The history is provided by the patient. This is a chronic problem. The current episode started more than 1 week ago. The problem occurs daily. The problem has been gradually improving. Prior to Admission medications    Medication Sig Start Date End Date Taking? Authorizing Provider   ergocalciferol (ERGOCALCIFEROL) 1,250 mcg (50,000 unit) capsule Take 1 Cap by mouth every thirty (30) days.  Indications: vitamin D deficiency (high dose therapy) 2/4/21  Yes Miguel A Martins MD   escitalopram oxalate (LEXAPRO) 10 mg tablet Take 1 Tab by mouth daily. Indications: grief and depression 1/6/21  Yes Sree Forbes MD   spironolactone (ALDACTONE) 50 mg tablet TAKE 1 TABLET BY MOUTH EVERY DAY 10/18/20  Yes Miguel A Martins MD   metoprolol tartrate (LOPRESSOR) 50 mg tablet TAKE 1 TABLET BY MOUTH EVERY DAY 9/11/20  Yes Miguel A Martins MD   simvastatin (ZOCOR) 40 mg tablet TAKE 1 TABLET BY MOUTH NIGHTLY 9/10/20  Yes Miguel A Martins MD   SYNTHROID 100 mcg tablet TAKE 1 TABLET BY MOUTH ONCE DAILY FOR HYPOTHYROIDISM 1/3/20  Yes Sree Forbes MD   albuterol (PROVENTIL HFA, VENTOLIN HFA, PROAIR HFA) 90 mcg/actuation inhaler Take 1 Puff by inhalation every four (4) hours as needed for Wheezing. 9/20/19  Yes Sree Forbes MD   ketoconazole (NIZORAL) 2 % topical cream APPLY A THIN LAYER TO AFFECTED AREA TWICE A DAY 1/16/19  Yes Provider, Historical   hydrocortisone (HYTONE) 2.5 % ointment APPLY TO AFFECTED AREAS DAILY AS DIRECTED WITH KETOCONAZOLE CREAM 1/16/19  Yes Provider, Historical   ammonium lactate (LAC-HYDRIN) 12 % lotion Use 1 Application to affected area Take As Needed. 11/16/18  Yes Provider, Historical   loratadine (CLARITIN) 10 mg tablet Take 1 Tab by mouth daily. 1/29/19  Yes Sree Forbes MD   diclofenac (VOLTAREN) 1 % gel APPLY QUARTER SIZED AMOUNT TO AFFECTED AREA(S) 3 TIMES DAILY 9/13/17  Yes Provider, Historical   BABY ASPIRIN PO Take 81 mg by mouth. Yes Provider, Historical   ergocalciferol (ERGOCALCIFEROL) 1,250 mcg (50,000 unit) capsule Take 1 Cap by mouth every seven (7) days. Indications: vitamin D deficiency (high dose therapy) 2/4/21 2/4/21  Sree Forbes MD   ergocalciferol (ERGOCALCIFEROL) 50,000 unit capsule Take 1 Cap by mouth every seven (7) days.  Indications: VITAMIN D DEFICIENCY (HIGH DOSE THERAPY)  Patient taking differently: Take 50,000 Units by mouth every month. 6/19/18 2/4/21  Lizzy Santiago MD     Allergies   Allergen Reactions    Amoxicillin Rash    Ampicillin Rash    Codeine Nausea and Vomiting    Tussionex Other (comments)     Hallucinations    Valium [Diazepam] Vertigo       Patient Active Problem List    Diagnosis Date Noted    CKD (chronic kidney disease) stage 3, GFR 30-59 ml/min 06/02/2020    Vitamin D deficiency 06/19/2018    Vertigo 02/20/2018    Type 2 diabetes mellitus with nephropathy ([facility] [address] 12/27/2017    Hypercholesterolemia     Post concussion syndrome 12/02/2011    HCVD (hypertensive cardiovascular disease) 12/02/2011    GERD (gastroesophageal reflux disease) 12/02/2011    Hypothyroidism 12/02/2011     Past Medical History:   Diagnosis Date    Benign neoplasm of choroid, right     BPPV (benign paroxysmal positional vertigo) 01/12/2018    had ENG at Dr. Rocky Lainez office    Cataracts, bilateral     age related   [de-identified] CRI (chronic renal insufficiency)     stage 3    Diabetes mellitus, type 2 ([facility] [address] 2/2016    by HBA1c    Disease of inner ear     GERD (gastroesophageal reflux disease) 12/2/2011    Hypercholesterolemia     Hypertension     Hypothyroidism 12/2/2011       Review of Systems   Psychiatric/Behavioral: Positive for depression. Objective: There were no vitals taken for this visit. General: alert, cooperative, no distress   Mental  status: normal mood, behavior, speech, dress, motor activity, and thought processes, able to follow commands   HENT: NCAT   Neck: no visualized mass   Resp: no respiratory distress   Neuro: no gross deficits   Skin: no discoloration or lesions of concern on visible areas   Psychiatric: normal affect, consistent with stated mood, no evidence of hallucinations     Additional exam findings: We discussed the expected course, resolution and complications of the diagnosis(es) in detail. Medication risks, benefits, costs, interactions, and alternatives were discussed as indicated.   I advised her to contact the office if her condition worsens, changes or fails to improve as anticipated. She expressed understanding with the diagnosis(es) and plan. Re Mckeon is a 67 y.o. female who was evaluated by a video visit encounter for concerns as above. Patient identification was verified prior to start of the visit. A caregiver was present when appropriate. Due to this being a TeleHealth encounter (During \[facility] [place] [facility]\""A-28 public health emergency), evaluation of the following organ systems was limited: Vitals/Constitutional/EENT/Resp/CV/GI//MS/Neuro/Skin/Heme-Lymph-Imm. Pursuant to the emergency declaration under the [facility] waiver authority and the [employer] and [employer] Act, this Virtual  Visit was conducted, with patient's (and/or legal guardian's) consent, to reduce the patient's risk of exposure to COVID-19 and provide necessary medical care. Services were provided through a video synchronous discussion virtually to substitute for in-person clinic visit. Patient and provider were located at their individual homes.       Tab Cardenas MD

## 2021-03-27 DIAGNOSIS — Z76.0 MEDICATION REFILL: ICD-10-CM

## 2021-03-27 RX ORDER — LEVOTHYROXINE SODIUM 100 UG/1
TABLET ORAL
Qty: 90 TAB | Refills: 3 | Status: SHIPPED | OUTPATIENT
Start: 2021-03-27 | End: 2021-04-14

## 2021-04-12 ENCOUNTER — OFFICE VISIT (OUTPATIENT)
Dept: INTERNAL MEDICINE CLINIC | Age: 76
End: 2021-04-12
Payer: MEDICARE

## 2021-04-12 ENCOUNTER — HOSPITAL ENCOUNTER (OUTPATIENT)
Dept: LAB | Age: 76
Discharge: HOME | End: 2021-04-12
Payer: MEDICARE

## 2021-04-12 VITALS
BODY MASS INDEX: 31.99 KG/M2 | HEIGHT: 64 IN | OXYGEN SATURATION: 97 % | SYSTOLIC BLOOD PRESSURE: 122 MMHG | WEIGHT: 187.4 LBS | TEMPERATURE: 98.2 F | RESPIRATION RATE: 16 BRPM | DIASTOLIC BLOOD PRESSURE: 66 MMHG | HEART RATE: 64 BPM

## 2021-04-12 DIAGNOSIS — E11.21 TYPE 2 DIABETES MELLITUS WITH NEPHROPATHY (HCC): Chronic | ICD-10-CM

## 2021-04-12 DIAGNOSIS — Z11.59 NEED FOR HEPATITIS C SCREENING TEST: ICD-10-CM

## 2021-04-12 DIAGNOSIS — E55.9 VITAMIN D DEFICIENCY: Chronic | ICD-10-CM

## 2021-04-12 DIAGNOSIS — E11.21 TYPE 2 DIABETES MELLITUS WITH NEPHROPATHY (HCC): Primary | Chronic | ICD-10-CM

## 2021-04-12 DIAGNOSIS — E03.9 HYPOTHYROIDISM, UNSPECIFIED TYPE: ICD-10-CM

## 2021-04-12 DIAGNOSIS — I11.9 HYPERTENSIVE HEART DISEASE, UNSPECIFIED WHETHER HEART FAILURE PRESENT: ICD-10-CM

## 2021-04-12 DIAGNOSIS — N18.30 STAGE 3 CHRONIC KIDNEY DISEASE, UNSPECIFIED WHETHER STAGE 3A OR 3B CKD (HCC): ICD-10-CM

## 2021-04-12 LAB
25(OH)D3 SERPL-MCNC: 46.1 NG/ML (ref 30–100)
ALBUMIN SERPL-MCNC: 3.7 G/DL (ref 3.4–5)
ALBUMIN/GLOB SERPL: 1.2 {RATIO} (ref 0.8–1.7)
ALP SERPL-CCNC: 58 U/L (ref 45–117)
ALT SERPL-CCNC: 17 U/L (ref 13–56)
ANION GAP SERPL CALC-SCNC: 5 MMOL/L (ref 3–18)
AST SERPL-CCNC: 14 U/L (ref 10–38)
BILIRUB SERPL-MCNC: 0.4 MG/DL (ref 0.2–1)
BUN SERPL-MCNC: 13 MG/DL (ref 7–18)
BUN/CREAT SERPL: 12 (ref 12–20)
CALCIUM SERPL-MCNC: 9.1 MG/DL (ref 8.5–10.1)
CHLORIDE SERPL-SCNC: 109 MMOL/L (ref 100–111)
CHOLEST SERPL-MCNC: 175 MG/DL
CO2 SERPL-SCNC: 27 MMOL/L (ref 21–32)
CREAT SERPL-MCNC: 1.13 MG/DL (ref 0.6–1.3)
GLOBULIN SER CALC-MCNC: 3.1 G/DL (ref 2–4)
GLUCOSE SERPL-MCNC: 119 MG/DL (ref 74–99)
HBA1C MFR BLD: 6.2 % (ref 4.2–5.6)
HDLC SERPL-MCNC: 44 MG/DL (ref 40–60)
HDLC SERPL: 4 {RATIO} (ref 0–5)
LDLC SERPL CALC-MCNC: 98.8 MG/DL (ref 0–100)
LIPID PROFILE,FLP: ABNORMAL
POTASSIUM SERPL-SCNC: 4 MMOL/L (ref 3.5–5.5)
PROT SERPL-MCNC: 6.8 G/DL (ref 6.4–8.2)
SODIUM SERPL-SCNC: 141 MMOL/L (ref 136–145)
T4 FREE SERPL-MCNC: 1.3 NG/DL (ref 0.7–1.5)
TRIGL SERPL-MCNC: 161 MG/DL (ref ?–150)
TSH SERPL DL<=0.05 MIU/L-ACNC: 0.25 UIU/ML (ref 0.36–3.74)
VLDLC SERPL CALC-MCNC: 32.2 MG/DL

## 2021-04-12 PROCEDURE — 1101F PT FALLS ASSESS-DOCD LE1/YR: CPT | Performed by: INTERNAL MEDICINE

## 2021-04-12 PROCEDURE — G8510 SCR DEP NEG, NO PLAN REQD: HCPCS | Performed by: INTERNAL MEDICINE

## 2021-04-12 PROCEDURE — 36415 COLL VENOUS BLD VENIPUNCTURE: CPT

## 2021-04-12 PROCEDURE — 84443 ASSAY THYROID STIM HORMONE: CPT

## 2021-04-12 PROCEDURE — 82306 VITAMIN D 25 HYDROXY: CPT

## 2021-04-12 PROCEDURE — 1090F PRES/ABSN URINE INCON ASSESS: CPT | Performed by: INTERNAL MEDICINE

## 2021-04-12 PROCEDURE — G8536 NO DOC ELDER MAL SCRN: HCPCS | Performed by: INTERNAL MEDICINE

## 2021-04-12 PROCEDURE — 80061 LIPID PANEL: CPT

## 2021-04-12 PROCEDURE — 83036 HEMOGLOBIN GLYCOSYLATED A1C: CPT

## 2021-04-12 PROCEDURE — 99214 OFFICE O/P EST MOD 30 MIN: CPT | Performed by: INTERNAL MEDICINE

## 2021-04-12 PROCEDURE — G8399 PT W/DXA RESULTS DOCUMENT: HCPCS | Performed by: INTERNAL MEDICINE

## 2021-04-12 PROCEDURE — G8417 CALC BMI ABV UP PARAM F/U: HCPCS | Performed by: INTERNAL MEDICINE

## 2021-04-12 PROCEDURE — 86803 HEPATITIS C AB TEST: CPT

## 2021-04-12 PROCEDURE — 80053 COMPREHEN METABOLIC PANEL: CPT

## 2021-04-12 PROCEDURE — G8427 DOCREV CUR MEDS BY ELIG CLIN: HCPCS | Performed by: INTERNAL MEDICINE

## 2021-04-12 NOTE — PROGRESS NOTES
Reviewed record in preparation for visit and have obtained necessary documentation. Jose De Jesus Noe is a 67 y.o.  female presents today for office visit for   Chief Complaint   Patient presents with    Hypertension    Diabetes    Cholesterol Problem    Bereavement Counseling   . Pt is not fasting. Patient is accompanied by self. Pt is in Room # 5. Jose De Jesus Noe preferred language for health care discussion is english/other. Is the patient using any DME equipment during OV? NO    Advance Directive:  1. Do you have an advance directive in place? Patient Reply: NO    2. If not, would you like material regarding how to put one in place? NO    Coordination of Care:  1. Have you been to the ER, urgent care clinic since your last visit? Hospitalized since your last visit? NO    2. Have you seen or consulted any other health care providers outside of the [address] [place] [address] since your last visit? Include any pap smears or colon screening. NO    Upcoming Appts  No    VORB: No orders of the defined types were placed in this encounter.  Benoit Spain MD/Molly Corea LPN    Jose De Jesus Noe is due for:   Health Maintenance Due   Topic    Hepatitis C Screening     Shingrix Vaccine Age 50> (2 of 2)     Health Maintenance reviewed and discussed per provider  Please order/place referral if appropriate.     Requested Prescriptions      No prescriptions requested or ordered in this encounter       Learning Assessment:  Learning Assessment 5/19/2015   PRIMARY LEARNER Patient   HIGHEST LEVEL OF EDUCATION - PRIMARY LEARNER  > 4 YEARS OF COLLEGE   BARRIERS PRIMARY LEARNER NONE   CO-LEARNER CAREGIVER No   PRIMARY LANGUAGE ENGLISH   LEARNER PREFERENCE PRIMARY READING     DEMONSTRATION   ANSWERED BY Patient   RELATIONSHIP SELF     Depression Screening:  3 most recent [facility] Screens 4/12/2021 2/4/2021 9/30/2020 8/27/2020 6/2/2020 1/29/2019 11/7/2018   PHQ Not Done Medical Reason (indicate in comments) - Active Diagnosis of Depression or Bipolar Disorder (No Data) - - -   Little interest or pleasure in doing things Not at all Not at all - More than half the days Not at all Not at all Not at all   Feeling down, depressed, irritable, or hopeless Several days More than half the days - More than half the days Not at all Not at all Not at all   Total Score PHQ 2 1 2 - 4 0 0 0   Trouble falling or staying asleep, or sleeping too much - - - - - - Not at all   Feeling tired or having little energy - - - - - - Several days   Poor appetite, weight loss, or overeating - - - - - - Not at all   Feeling bad about yourself - or that you are a failure or have let yourself or your family down - - - - - - Several days   Trouble concentrating on things such as school, work, reading, or watching TV - - - - - - Not at all   Moving or speaking so slowly that other people could have noticed; or the opposite being so fidgety that others notice - - - - - - Nearly every day   Thoughts of being better off dead, or hurting yourself in some way - - - - - - Not at all   PHQ 9 Score - - - - - - 5     Abuse Screening:  Abuse Screening Questionnaire 6/2/2020 11/13/2017 5/19/2015   Do you ever feel afraid of your partner? N N N   Are you in a relationship with someone who physically or mentally threatens you? N N N   Is it safe for you to go home? Ceil Epley     Fall Risk  Fall Risk Assessment, last 12 mths 8/27/2020 6/2/2020 8/1/2019 4/8/2019 1/29/2019 11/7/2018 6/19/2018   Able to walk? Yes Yes Yes Yes Yes Yes Yes   Fall in past 12 months? No Yes Yes Yes No No No   Number of falls in past 12 months - 2 3 1 - - -   Fall with injury?  - 0 0 1 - - -     Recent Travel Screening and Travel History documentation     Travel Screening      No screening recorded since 04/11/21 0000      Travel History   Travel since 03/12/21     No documented travel since 03/12/21

## 2021-04-12 NOTE — PROGRESS NOTES
HISTORY OF PRESENT ILLNESS  Yunior Champion is a 67 y.o. female. /66 (BP 1 Location: Left upper arm, BP Patient Position: Sitting, BP Cuff Size: Adult)   Pulse 64   Temp 98.2 °F (36.8 °C) (Temporal)   Resp 16   Ht 5' 4\" (1.626 m)   Wt 187 lb 6.4 oz (85 kg)   SpO2 97%   BMI 32.17 kg/m²     Hypertension   The history is provided by the patient. This is a chronic problem. The current episode started more than 1 week ago. The problem has not changed since onset. Pertinent negatives include no chest pain, no palpitations, no headaches, no dizziness and no shortness of breath. There are no associated agents to hypertension. Risk factors include dyslipidemia and diabetes mellitus. Diabetes  The history is provided by the patient. This is a chronic problem. The current episode started more than 1 week ago. The problem occurs daily. The problem has not changed since onset. Pertinent negatives include no chest pain, no headaches and no shortness of breath. Review of Systems   Constitutional: Negative for chills and fever. Respiratory: Negative for shortness of breath. Cardiovascular: Negative for chest pain and palpitations. Neurological: Negative for dizziness and headaches. Psychiatric/Behavioral:        Sadness still over 's passing last June       Physical Exam  Vitals signs and nursing note reviewed. Constitutional:       Appearance: Normal appearance. She is well-developed. Cardiovascular:      Rate and Rhythm: Normal rate and regular rhythm. Pulmonary:      Effort: Pulmonary effort is normal.      Breath sounds: Normal breath sounds. Skin:     General: Skin is warm and dry. Neurological:      General: No focal deficit present. Mental Status: She is alert and oriented to person, place, and time. Psychiatric:         Attention and Perception: Attention and perception normal.         Mood and Affect: Mood is depressed.          Speech: Speech normal.         Behavior: Behavior normal.         Thought Content: Thought content normal.         ASSESSMENT and PLAN    ICD-10-CM ICD-9-CM    1. Type 2 diabetes mellitus with nephropathy (HCC)  M66.68 278.33 METABOLIC PANEL, COMPREHENSIVE     583.81 LIPID PANEL      HEMOGLOBIN A1C W/O EAG   2. Stage 3 chronic kidney disease, unspecified whether stage 3a or 3b CKD (HCC)  N18.30 585.3    3. Hypothyroidism, unspecified type  E03.9 244.9 TSH AND FREE T4   4. Vitamin D deficiency  E55.9 268.9 VITAMIN D, 25 HYDROXY   5. Hypertensive heart disease, unspecified whether heart failure present  R28.7 106.68 METABOLIC PANEL, COMPREHENSIVE      LIPID PANEL   6.  Need for hepatitis C screening test  Z11.59 V73.89 HCV AB W/RFLX TO [place]         BP controlled    DM has been controlled    Update lab    F/u 4 months for MWV, HTN, DM,

## 2021-04-14 DIAGNOSIS — Z76.0 MEDICATION REFILL: ICD-10-CM

## 2021-04-14 LAB
HCV AB S/CO SERPL IA: <0.1 S/CO RATIO (ref 0–0.9)
HCV AB SERPL QL IA: NORMAL

## 2021-04-14 RX ORDER — LEVOTHYROXINE SODIUM 100 UG/1
TABLET ORAL
Qty: 90 TAB | Refills: 3
Start: 2021-04-14 | End: 2021-06-07

## 2021-04-28 ENCOUNTER — OFFICE VISIT (OUTPATIENT)
Dept: INTERNAL MEDICINE CLINIC | Age: 76
End: 2021-04-28
Payer: MEDICARE

## 2021-04-28 VITALS
HEIGHT: 64 IN | RESPIRATION RATE: 19 BRPM | TEMPERATURE: 97.5 F | DIASTOLIC BLOOD PRESSURE: 82 MMHG | BODY MASS INDEX: 31.19 KG/M2 | HEART RATE: 66 BPM | WEIGHT: 182.7 LBS | OXYGEN SATURATION: 96 % | SYSTOLIC BLOOD PRESSURE: 127 MMHG

## 2021-04-28 DIAGNOSIS — E11.21 TYPE 2 DIABETES MELLITUS WITH NEPHROPATHY (HCC): ICD-10-CM

## 2021-04-28 DIAGNOSIS — R06.09 DYSPNEA ON EXERTION: Primary | ICD-10-CM

## 2021-04-28 DIAGNOSIS — I11.9 HYPERTENSIVE HEART DISEASE, UNSPECIFIED WHETHER HEART FAILURE PRESENT: ICD-10-CM

## 2021-04-28 PROCEDURE — G8510 SCR DEP NEG, NO PLAN REQD: HCPCS | Performed by: INTERNAL MEDICINE

## 2021-04-28 PROCEDURE — 1090F PRES/ABSN URINE INCON ASSESS: CPT | Performed by: INTERNAL MEDICINE

## 2021-04-28 PROCEDURE — G8417 CALC BMI ABV UP PARAM F/U: HCPCS | Performed by: INTERNAL MEDICINE

## 2021-04-28 PROCEDURE — G8427 DOCREV CUR MEDS BY ELIG CLIN: HCPCS | Performed by: INTERNAL MEDICINE

## 2021-04-28 PROCEDURE — 1101F PT FALLS ASSESS-DOCD LE1/YR: CPT | Performed by: INTERNAL MEDICINE

## 2021-04-28 PROCEDURE — G8536 NO DOC ELDER MAL SCRN: HCPCS | Performed by: INTERNAL MEDICINE

## 2021-04-28 PROCEDURE — G8399 PT W/DXA RESULTS DOCUMENT: HCPCS | Performed by: INTERNAL MEDICINE

## 2021-04-28 PROCEDURE — 99214 OFFICE O/P EST MOD 30 MIN: CPT | Performed by: INTERNAL MEDICINE

## 2021-04-28 RX ORDER — ALBUTEROL SULFATE 90 UG/1
1 AEROSOL, METERED RESPIRATORY (INHALATION)
Qty: 1 INHALER | Refills: 1 | Status: SHIPPED | OUTPATIENT
Start: 2021-04-28

## 2021-04-28 NOTE — PROGRESS NOTES
HISTORY OF PRESENT ILLNESS  Dagoberto Coombs is a 67 y.o. female. /82 (BP 1 Location: Left upper arm, BP Patient Position: Sitting, BP Cuff Size: Adult)   Pulse 66   Temp 97.5 °F (36.4 °C) (Temporal)   Resp 19   Ht 5' 4\" (1.626 m)   Wt 182 lb 11.2 oz (82.9 kg)   SpO2 96%   BMI 31.36 kg/m²     For a few weeks a has been having some dyspnea on exertion. Slight indigestion (relieved by elevating HOB). No pain in shoulder or jaw. Occasional sweats with the dyspnea    Had a nuclear stress test at [facility] in Oct 2019 that was excellent. Shortness of Breath  The history is provided by the patient (see comments). This is a new problem. The problem occurs frequently. Associated symptoms include wheezing (? wheeze). Pertinent negatives include no fever, no headaches, no sore throat, no cough, no sputum production, no chest pain and no leg swelling. Review of Systems   Constitutional: Negative for chills and fever. HENT: Positive for congestion. Negative for sore throat. Respiratory: Positive for shortness of breath and wheezing (? wheeze). Negative for cough and sputum production. Cardiovascular: Negative for chest pain, palpitations and leg swelling. Neurological: Negative for dizziness and headaches. Physical Exam  Vitals signs and nursing note reviewed. Constitutional:       Appearance: Normal appearance. She is well-developed. HENT:      Head: Normocephalic and atraumatic. Cardiovascular:      Rate and Rhythm: Normal rate and regular rhythm. Pulmonary:      Effort: Pulmonary effort is normal.      Breath sounds: Normal breath sounds. Musculoskeletal:      Right lower leg: No edema. Left lower leg: No edema. Skin:     General: Skin is warm and dry. Neurological:      General: No focal deficit present. Mental Status: She is alert and oriented to person, place, and time.    Psychiatric:         Mood and Affect: Mood normal.         Behavior: Behavior normal. ASSESSMENT and PLAN    ICD-10-CM ICD-9-CM    1. Dyspnea on exertion  R06.00 786.09 albuterol (PROVENTIL HFA, VENTOLIN HFA, PROAIR HFA) 90 mcg/actuation inhaler      ECHO ADULT COMPLETE      EKG, 12 LEAD, INITIAL      CANCELED: AMB POC EKG ROUTINE W/ 12 LEADS, INTER & REP   2. Hypertensive heart disease, unspecified whether heart failure present  I11.9 402.90    3. Type 2 diabetes mellitus with nephropathy (HCC)  E11.21 250.40      583.81      She had DM and HTN  But soft sxs at present. She thinks it may be allergies  Consider also her GERD  It has been a difficult year for her emotionally    Will obtain EKG today--unable to obtain here due to equipment failure. Slip given for the hospital  Request echo to look for any changes in overall function and wall motion  Will tx with MDI as we did in 2019  For any worsening, go to the ER.     Reviewed recent lab with her which was very good    F/u here 2 weeks

## 2021-04-28 NOTE — PROGRESS NOTES
Reviewed record in preparation for visit and have obtained necessary documentation. Alfonso Young is a 67 y.o.  female presents today for office visit for   Chief Complaint   Patient presents with    Shortness of Breath   . Pt is not fasting. Patient is accompanied by self. Pt is in Room # [facility] preferred language for health care discussion is english/other. Is the patient using any DME equipment during OV? NO    Advance Directive:  1. Do you have an advance directive in place? Patient Reply: NO    2. If not, would you like material regarding how to put one in place? NO    Coordination of Care:  1. Have you been to the ER, urgent care clinic since your last visit? Hospitalized since your last visit? NO    2. Have you seen or consulted any other health care providers outside of the [address] [place] [address] since your last visit? Include any pap smears or colon screening. YES, [facility] 4/2021    Upcoming Appts  Yes [facility] 4/30/21    VORB: No orders of the defined types were placed in this encounter.  Neal Reed MD/Molly Gallardo LPN    Alfonso Young is due for:   Health Maintenance Due   Topic    Shingrix Vaccine Age 50> (2 of 2)     Health Maintenance reviewed and discussed per provider  Please order/place referral if appropriate.     Requested Prescriptions      No prescriptions requested or ordered in this encounter       Learning Assessment:  Learning Assessment 5/19/2015   PRIMARY LEARNER Patient   HIGHEST LEVEL OF EDUCATION - PRIMARY LEARNER  > 4 YEARS OF COLLEGE   BARRIERS PRIMARY LEARNER NONE   CO-LEARNER CAREGIVER No   PRIMARY LANGUAGE ENGLISH   LEARNER PREFERENCE PRIMARY READING     DEMONSTRATION   ANSWERED BY Patient   RELATIONSHIP SELF     Depression Screening:  3 most recent [facility] Screens 4/12/2021 2/4/2021 9/30/2020 8/27/2020 6/2/2020 1/29/2019 11/7/2018   PHQ Not Done Medical Reason (indicate in comments) - Active Diagnosis of Depression or Bipolar Disorder (No Data) - - -   Little interest or pleasure in doing things Not at all Not at all - More than half the days Not at all Not at all Not at all   Feeling down, depressed, irritable, or hopeless Several days More than half the days - More than half the days Not at all Not at all Not at all   Total Score PHQ 2 1 2 - 4 0 0 0   Trouble falling or staying asleep, or sleeping too much - - - - - - Not at all   Feeling tired or having little energy - - - - - - Several days   Poor appetite, weight loss, or overeating - - - - - - Not at all   Feeling bad about yourself - or that you are a failure or have let yourself or your family down - - - - - - Several days   Trouble concentrating on things such as school, work, reading, or watching TV - - - - - - Not at all   Moving or speaking so slowly that other people could have noticed; or the opposite being so fidgety that others notice - - - - - - Nearly every day   Thoughts of being better off dead, or hurting yourself in some way - - - - - - Not at all   PHQ 9 Score - - - - - - 5     Abuse Screening:  Abuse Screening Questionnaire 6/2/2020 11/13/2017 5/19/2015   Do you ever feel afraid of your partner? N N N   Are you in a relationship with someone who physically or mentally threatens you? N N N   Is it safe for you to go home? Sophy Norris     Fall Risk  Fall Risk Assessment, last 12 mths 8/27/2020 6/2/2020 8/1/2019 4/8/2019 1/29/2019 11/7/2018 6/19/2018   Able to walk? Yes Yes Yes Yes Yes Yes Yes   Fall in past 12 months? No Yes Yes Yes No No No   Number of falls in past 12 months - 2 3 1 - - -   Fall with injury?  - 0 0 1 - - -     Recent Travel Screening and Travel History documentation     Travel Screening      No screening recorded since 04/27/21 0000      Travel History   Travel since 03/28/21     No documented travel since 03/28/21

## 2021-06-07 ENCOUNTER — OFFICE VISIT (OUTPATIENT)
Dept: INTERNAL MEDICINE CLINIC | Age: 76
End: 2021-06-07
Payer: MEDICARE

## 2021-06-07 VITALS
SYSTOLIC BLOOD PRESSURE: 135 MMHG | HEART RATE: 55 BPM | RESPIRATION RATE: 16 BRPM | DIASTOLIC BLOOD PRESSURE: 56 MMHG | BODY MASS INDEX: 31.07 KG/M2 | HEIGHT: 64 IN | OXYGEN SATURATION: 96 % | WEIGHT: 182 LBS

## 2021-06-07 DIAGNOSIS — F43.21 GRIEF: ICD-10-CM

## 2021-06-07 DIAGNOSIS — I11.9 HYPERTENSIVE HEART DISEASE, UNSPECIFIED WHETHER HEART FAILURE PRESENT: ICD-10-CM

## 2021-06-07 DIAGNOSIS — R06.09 DYSPNEA ON EXERTION: Primary | ICD-10-CM

## 2021-06-07 DIAGNOSIS — Z76.0 MEDICATION REFILL: ICD-10-CM

## 2021-06-07 DIAGNOSIS — E03.9 HYPOTHYROIDISM, UNSPECIFIED TYPE: ICD-10-CM

## 2021-06-07 PROCEDURE — 1101F PT FALLS ASSESS-DOCD LE1/YR: CPT | Performed by: INTERNAL MEDICINE

## 2021-06-07 PROCEDURE — G8399 PT W/DXA RESULTS DOCUMENT: HCPCS | Performed by: INTERNAL MEDICINE

## 2021-06-07 PROCEDURE — G8510 SCR DEP NEG, NO PLAN REQD: HCPCS | Performed by: INTERNAL MEDICINE

## 2021-06-07 PROCEDURE — G8417 CALC BMI ABV UP PARAM F/U: HCPCS | Performed by: INTERNAL MEDICINE

## 2021-06-07 PROCEDURE — G8427 DOCREV CUR MEDS BY ELIG CLIN: HCPCS | Performed by: INTERNAL MEDICINE

## 2021-06-07 PROCEDURE — 99214 OFFICE O/P EST MOD 30 MIN: CPT | Performed by: INTERNAL MEDICINE

## 2021-06-07 PROCEDURE — 1090F PRES/ABSN URINE INCON ASSESS: CPT | Performed by: INTERNAL MEDICINE

## 2021-06-07 PROCEDURE — G8536 NO DOC ELDER MAL SCRN: HCPCS | Performed by: INTERNAL MEDICINE

## 2021-06-07 RX ORDER — LEVOTHYROXINE SODIUM 88 UG/1
88 TABLET ORAL
Qty: 90 TABLET | Refills: 1 | Status: SHIPPED | OUTPATIENT
Start: 2021-06-07 | End: 2021-06-21

## 2021-06-07 NOTE — PROGRESS NOTES
HISTORY OF PRESENT ILLNESS  Sarah Arechiga is a 67 y.o. female. BP (!) 135/56 (BP 1 Location: Right arm, BP Patient Position: Sitting, BP Cuff Size: Adult)   Pulse (!) 55   Resp 16   Ht 5' 4\" (1.626 m)   Wt 182 lb (82.6 kg)   SpO2 96%   BMI 31.24 kg/m²               BP Right arm 124/71   Left arm  112/63  Pt concerned because her BP has been fluctuating some    Breathing Problem  The history is provided by the patient. This is a chronic problem. The problem occurs intermittently. The problem has not changed since onset. Pertinent negatives include no fever. Medication Evaluation  Associated symptoms include shortness of breath (no significant change. Mostly when \"rushing around\"). Review of Systems   Constitutional: Negative for chills and fever. Respiratory: Positive for shortness of breath (no significant change. Mostly when \"rushing around\"). Psychiatric/Behavioral: The patient is nervous/anxious and has insomnia. Physical Exam  Vitals and nursing note reviewed. Constitutional:       Appearance: Normal appearance. She is well-developed. HENT:      Head: Normocephalic and atraumatic. Cardiovascular:      Rate and Rhythm: Normal rate and regular rhythm. Pulmonary:      Effort: Pulmonary effort is normal.      Breath sounds: Normal breath sounds. Musculoskeletal:      Right lower leg: No edema. Left lower leg: No edema. Skin:     General: Skin is warm and dry. Neurological:      General: No focal deficit present. Mental Status: She is alert and oriented to person, place, and time. Psychiatric:         Mood and Affect: Mood normal.         Behavior: Behavior normal.         ASSESSMENT and PLAN    ICD-10-CM ICD-9-CM    1. Dyspnea on exertion  R06.00 786.09    2. Hypertensive heart disease, unspecified whether heart failure present  I11.9 402.90    3. Grief  F43.21 309.0    4. Hypothyroidism, unspecified type  E03.9 244.9 levothyroxine (SYNTHROID) 88 mcg tablet   5. Medication refill  Z76.0 V68.1 levothyroxine (SYNTHROID) 88 mcg tablet       Doing so/so today  Dyspnea improved. ? Anxiety related    Tomorrow is the 1 yr anniversary of her 's death. Will visit his grave tomorrow with her son. A little tearful today. Feeling very sad    Refill as noted. To decrease the levothyroxine a little based on last lab.     F/u here 6-7 weeks for MWV, HTN, DM, thyroid

## 2021-06-07 NOTE — PROGRESS NOTES
Reviewed record in preparation for visit and have obtained necessary documentation. Aurelio Shi is a 67 y.o.  female presents today for office visit for   Chief Complaint   Patient presents with    Breathing Problem    Medication Evaluation   . Pt is not fasting. Patient is accompanied by self. Pt is in Room # 2055 [facility] Baby Martinet preferred language for health care discussion is english/other. Is the patient using any DME equipment during OV? NO    Advance Directive:  1. Do you have an advance directive in place? Patient Reply: NO    2. If not, would you like material regarding how to put one in place? NO    Coordination of Care:  1. Have you been to the ER, urgent care clinic since your last visit? Hospitalized since your last visit? NO    2. Have you seen or consulted any other health care providers outside of the [address] [place] [address] since your last visit? Include any pap smears or colon screening. NO    Upcoming Appts  Yes Dr. Anabell Stanley 7/2021    VORB: No orders of the defined types were placed in this encounter.  Gustavus Pitcairn Jeppie Litten, MD/Molly Cox LPN    Aurelio Shi is due for:   Health Maintenance Due   Topic    Shingrix Vaccine Age 50> (2 of 2)    Foot Exam Q1     Medicare Yearly Exam      Health Maintenance reviewed and discussed per provider  Please order/place referral if appropriate.     Requested Prescriptions      No prescriptions requested or ordered in this encounter       Learning Assessment:  Learning Assessment 5/19/2015   PRIMARY LEARNER Patient   HIGHEST LEVEL OF EDUCATION - PRIMARY LEARNER  > 4 YEARS OF COLLEGE   BARRIERS PRIMARY LEARNER NONE   CO-LEARNER CAREGIVER No   PRIMARY LANGUAGE ENGLISH   LEARNER PREFERENCE PRIMARY READING     DEMONSTRATION   ANSWERED BY Patient   RELATIONSHIP SELF     Depression Screening:  3 most recent [facility] 36 Screens 6/7/2021 4/28/2021 4/12/2021 2/4/2021 9/30/2020 8/27/2020 6/2/2020   PHQ Not Done Medical Reason (indicate in comments) - Medical Reason (indicate in comments) - Active Diagnosis of Depression or Bipolar Disorder (No Data) -   Little interest or pleasure in doing things Not at all Not at all Not at all Not at all - More than half the days Not at all   Feeling down, depressed, irritable, or hopeless Several days Not at all Several days More than half the days - More than half the days Not at all   Total Score PHQ 2 1 0 1 2 - 4 0   Trouble falling or staying asleep, or sleeping too much - - - - - - -   Feeling tired or having little energy - - - - - - -   Poor appetite, weight loss, or overeating - - - - - - -   Feeling bad about yourself - or that you are a failure or have let yourself or your family down - - - - - - -   Trouble concentrating on things such as school, work, reading, or watching TV - - - - - - -   Moving or speaking so slowly that other people could have noticed; or the opposite being so fidgety that others notice - - - - - - -   Thoughts of being better off dead, or hurting yourself in some way - - - - - - -   PHQ 9 Score - - - - - - -     Abuse Screening:  Abuse Screening Questionnaire 6/2/2020 11/13/2017 5/19/2015   Do you ever feel afraid of your partner? N N N   Are you in a relationship with someone who physically or mentally threatens you? N N N   Is it safe for you to go home? Arsen Sharpe     Fall Risk  Fall Risk Assessment, last 12 mths 6/7/2021 4/28/2021 8/27/2020 6/2/2020 8/1/2019 4/8/2019 1/29/2019   Able to walk? Yes Yes Yes Yes Yes Yes Yes   Fall in past 12 months? 0 0 No Yes Yes Yes No   Do you feel unsteady? 0 0 - - - - -   Are you worried about falling 0 0 - - - - -   Number of falls in past 12 months - - - 2 3 1 -   Fall with injury? - - - 0 0 1 -     Recent Travel Screening and Travel History documentation     Travel Screening     Question   Response    In the last month, have you been in contact with someone who was confirmed or suspected to have Coronavirus / COVID-19?   No / Unsure    Have you had a COVID-19 viral test in the last 14 days? No    Do you have any of the following new or worsening symptoms? None of these    Have you traveled internationally or domestically in the last month?   No      Travel History   Travel since 05/07/21     No documented travel since 05/07/21

## 2021-06-17 ENCOUNTER — TELEPHONE (OUTPATIENT)
Dept: INTERNAL MEDICINE CLINIC | Age: 76
End: 2021-06-17

## 2021-06-17 DIAGNOSIS — E03.9 HYPOTHYROIDISM, UNSPECIFIED TYPE: ICD-10-CM

## 2021-06-17 DIAGNOSIS — Z76.0 MEDICATION REFILL: ICD-10-CM

## 2021-06-17 NOTE — TELEPHONE ENCOUNTER
Called pt and left message. Call back number left and I myself or one of the other nurses will attempt to contact again. The call was r/t low energy.

## 2021-06-17 NOTE — TELEPHONE ENCOUNTER
Please return call to patient patient states that she has not had a lot of energy lately and is concerned please return call when available

## 2021-06-21 DIAGNOSIS — Z76.0 MEDICATION REFILL: ICD-10-CM

## 2021-06-21 DIAGNOSIS — E03.9 HYPOTHYROIDISM, UNSPECIFIED TYPE: ICD-10-CM

## 2021-06-21 RX ORDER — LEVOTHYROXINE SODIUM 100 UG/1
TABLET ORAL
Qty: 78 TABLET | Refills: 5 | Status: SHIPPED | OUTPATIENT
Start: 2021-06-21 | End: 2022-04-11

## 2021-06-21 RX ORDER — LEVOTHYROXINE SODIUM 100 UG/1
88 TABLET ORAL
Status: CANCELLED | OUTPATIENT
Start: 2021-06-21

## 2021-06-21 NOTE — TELEPHONE ENCOUNTER
Received a call from the pt stating taking levothyroxine 88mcg  Is making it difficult to function performing the day to day living. Pt states she restarting levothyroxine 100mcg taking 6 days a week. Pt is requesting a refill the levothyroxine 100mcg to be start to the pharmacy.

## 2021-06-21 NOTE — TELEPHONE ENCOUNTER
This really should have nothing to do with the dosing (it is identical), but I will refill the 100 mcg as requested.

## 2021-06-21 NOTE — TELEPHONE ENCOUNTER
Spoke with pt in regards to medication refill for levothyroxine. Relayed the 's note. Pt acknowledges understanding and voices no concerns at this time.

## 2021-08-09 ENCOUNTER — OFFICE VISIT (OUTPATIENT)
Dept: INTERNAL MEDICINE CLINIC | Age: 76
End: 2021-08-09
Payer: MEDICARE

## 2021-08-09 VITALS
TEMPERATURE: 97.1 F | RESPIRATION RATE: 18 BRPM | BODY MASS INDEX: 30.46 KG/M2 | OXYGEN SATURATION: 96 % | WEIGHT: 178.4 LBS | HEART RATE: 54 BPM | HEIGHT: 64 IN | DIASTOLIC BLOOD PRESSURE: 76 MMHG | SYSTOLIC BLOOD PRESSURE: 113 MMHG

## 2021-08-09 DIAGNOSIS — Z00.00 MEDICARE ANNUAL WELLNESS VISIT, SUBSEQUENT: Primary | ICD-10-CM

## 2021-08-09 DIAGNOSIS — E55.9 VITAMIN D DEFICIENCY: ICD-10-CM

## 2021-08-09 DIAGNOSIS — E03.9 HYPOTHYROIDISM, UNSPECIFIED TYPE: ICD-10-CM

## 2021-08-09 DIAGNOSIS — E11.21 TYPE 2 DIABETES MELLITUS WITH NEPHROPATHY (HCC): ICD-10-CM

## 2021-08-09 DIAGNOSIS — I11.9 HYPERTENSIVE HEART DISEASE WITHOUT HEART FAILURE: ICD-10-CM

## 2021-08-09 LAB — HBA1C MFR BLD HPLC: 5.7 %

## 2021-08-09 PROCEDURE — 99213 OFFICE O/P EST LOW 20 MIN: CPT | Performed by: INTERNAL MEDICINE

## 2021-08-09 PROCEDURE — G0439 PPPS, SUBSEQ VISIT: HCPCS | Performed by: INTERNAL MEDICINE

## 2021-08-09 PROCEDURE — G8399 PT W/DXA RESULTS DOCUMENT: HCPCS | Performed by: INTERNAL MEDICINE

## 2021-08-09 PROCEDURE — G8536 NO DOC ELDER MAL SCRN: HCPCS | Performed by: INTERNAL MEDICINE

## 2021-08-09 PROCEDURE — 83036 HEMOGLOBIN GLYCOSYLATED A1C: CPT | Performed by: INTERNAL MEDICINE

## 2021-08-09 PROCEDURE — 1090F PRES/ABSN URINE INCON ASSESS: CPT | Performed by: INTERNAL MEDICINE

## 2021-08-09 PROCEDURE — 1101F PT FALLS ASSESS-DOCD LE1/YR: CPT | Performed by: INTERNAL MEDICINE

## 2021-08-09 PROCEDURE — G8427 DOCREV CUR MEDS BY ELIG CLIN: HCPCS | Performed by: INTERNAL MEDICINE

## 2021-08-09 PROCEDURE — G8417 CALC BMI ABV UP PARAM F/U: HCPCS | Performed by: INTERNAL MEDICINE

## 2021-08-09 PROCEDURE — G8510 SCR DEP NEG, NO PLAN REQD: HCPCS | Performed by: INTERNAL MEDICINE

## 2021-08-09 NOTE — PROGRESS NOTES
HISTORY OF PRESENT ILLNESS  Lennox Pickler is a 67 y.o. female. /76 (BP 1 Location: Right arm, BP Patient Position: Sitting, BP Cuff Size: Adult)   Pulse (!) 54   Temp 97.1 °F (36.2 °C) (Temporal)   Resp 18   Ht 5' 4\" (1.626 m)   Wt 178 lb 6.4 oz (80.9 kg)   SpO2 96%   BMI 30.62 kg/m²     Hypertension   The history is provided by the patient. This is a chronic problem. The current episode started more than 1 week ago. Pertinent negatives include no chest pain, no palpitations, no headaches, no dizziness and no shortness of breath. Risk factors include dyslipidemia and diabetes mellitus. Diabetes  The history is provided by the patient. This is a chronic problem. The current episode started more than 1 week ago. The problem occurs daily. Pertinent negatives include no chest pain, no headaches and no shortness of breath. Cholesterol Problem  The history is provided by the patient. This is a chronic problem. The current episode started more than 1 week ago. The problem occurs daily. Pertinent negatives include no chest pain, no headaches and no shortness of breath. Review of Systems   Constitutional: Negative for chills and fever. Respiratory: Negative for shortness of breath. Cardiovascular: Negative for chest pain and palpitations. Neurological: Negative for dizziness and headaches. Physical Exam  Vitals and nursing note reviewed. Constitutional:       Appearance: Normal appearance. She is well-developed. HENT:      Head: Normocephalic and atraumatic. Cardiovascular:      Rate and Rhythm: Normal rate and regular rhythm. Pulmonary:      Effort: Pulmonary effort is normal.      Breath sounds: Normal breath sounds. Musculoskeletal:      Right lower leg: No edema. Left lower leg: No edema. Skin:     General: Skin is warm and dry. Neurological:      General: No focal deficit present. Mental Status: She is alert and oriented to person, place, and time. Psychiatric:         Mood and Affect: Mood normal.         Behavior: Behavior normal.         ASSESSMENT and PLAN    ICD-10-CM ICD-9-CM             2. Type 2 diabetes mellitus with nephropathy (HCC)  E11.21 250.40 AMB POC HEMOGLOBIN A1C     583.81  DIABETES FOOT EXAM   3. Hypertensive heart disease without heart failure  I11.9 402.90    4. Vitamin D deficiency  E55.9 268.9    5. Hypothyroidism, unspecified type  E03.9 244.9        Reviewed last lab here  reviewed lab from nephrology    BP controlled    F/u 4 months for HTN, DM, chol              This is the Subsequent Medicare Annual Wellness Exam, performed 12 months or more after the Initial AWV or the last Subsequent AWV    I have reviewed the patient's medical history in detail and updated the computerized patient record. Assessment/Plan   Education and counseling provided:  Are appropriate based on today's review and evaluation    1.  Medicare annual wellness visit, subsequent      Depression Risk Factor Screening     3 most recent PHQ Screens 8/9/2021   PHQ Not Done -   Little interest or pleasure in doing things Not at all   Feeling down, depressed, irritable, or hopeless More than half the days   Total Score PHQ 2 2   Trouble falling or staying asleep, or sleeping too much -   Feeling tired or having little energy -   Poor appetite, weight loss, or overeating -   Feeling bad about yourself - or that you are a failure or have let yourself or your family down -   Trouble concentrating on things such as school, work, reading, or watching TV -   Moving or speaking so slowly that other people could have noticed; or the opposite being so fidgety that others notice -   Thoughts of being better off dead, or hurting yourself in some way -   PHQ 9 Score -       Alcohol Risk Screen    Do you average more than 1 drink per night or more than 7 drinks a week:  No    On any one occasion in the past three months have you have had more than 3 drinks containing alcohol:  No        Functional Ability and Level of Safety    Hearing: Hearing is good. Activities of Daily Living: The home contains: grab bars  Patient does total self care      Ambulation: with mild difficulty     Fall Risk:  Fall Risk Assessment, last 12 mths 8/9/2021   Able to walk? Yes   Fall in past 12 months? 0   Do you feel unsteady? 0   Are you worried about falling 0   Number of falls in past 12 months -   Fall with injury?  -      Abuse Screen:  Patient is not abused       Cognitive Screening    Has your family/caregiver stated any concerns about your memory: no     Cognitive Screening: Normal - Mini Cog Test    Health Maintenance Due     Health Maintenance Due   Topic Date Due    Shingrix Vaccine Age 49> (2 of 2) 01/08/2021       Patient Care Team   Patient Care Team:  Lynne Garcia MD as PCP - General (Internal Medicine)  Lynne Garcia MD as PCP - [address] [place] [address] Provider  Sherwin Sanches DPM (Podiatry)  Shereen Sheikh MD (Otolaryngology)  Sally Quispe MD as Consulting Provider (Ophthalmology)  Mateus Haskins MD as Consulting Provider (Nephrology)    History     Patient Active Problem List   Diagnosis Code    Post concussion syndrome F07.81    HCVD (hypertensive cardiovascular disease) I11.9    GERD (gastroesophageal reflux disease) K21.9    Hypothyroidism E03.9    Hypercholesterolemia E78.00    Type 2 diabetes mellitus with nephropathy ([address] E11.21    Vertigo R42    Vitamin D deficiency E55.9    CKD (chronic kidney disease) stage 3, GFR 30-59 ml/min ([address] N18.30     Past Medical History:   Diagnosis Date    Benign neoplasm of choroid, right     BPPV (benign paroxysmal positional vertigo) 01/12/2018    had ENG at Dr. Olivia Sanchez office    Cataracts, bilateral     age related   Yohana Coy CRI (chronic renal insufficiency)     stage 3    Diabetes mellitus, type 2 ([address] 2/2016    by HBA1c    Disease of inner ear     GERD (gastroesophageal reflux disease) 12/2/2011    Hypercholesterolemia     Hypertension     Hypothyroidism 12/2/2011      Past Surgical History:   Procedure Laterality Date    HX BREAST REDUCTION      bilateral    HX CARPAL TUNNEL RELEASE      bilateral     HX COLONOSCOPY  7/12/13    no f/u, Dr. Frank Elliott      right tympanicplastty    HX HYSTERECTOMY      partial    HX ORTHOPAEDIC      right ulna    HX ORTHOPAEDIC      bilateral trigger finger releases    [place] BIOPSY OF BREAST, INCISIONAL  1995    left     Current Outpatient Medications   Medication Sig Dispense Refill    levothyroxine (SYNTHROID) 100 mcg tablet Take on tab by mouth daily for 6 days. Skip day 7. Indications: a condition with low thyroid hormone levels 78 Tablet 5    albuterol (PROVENTIL HFA, VENTOLIN HFA, PROAIR HFA) 90 mcg/actuation inhaler Take 1 Puff by inhalation every four (4) hours as needed for Wheezing. 1 Inhaler 1    ergocalciferol (ERGOCALCIFEROL) 1,250 mcg (50,000 unit) capsule Take 1 Cap by mouth every thirty (30) days. Indications: vitamin D deficiency (high dose therapy) 3 Cap 3    escitalopram oxalate (LEXAPRO) 10 mg tablet Take 1 Tab by mouth daily. Indications: grief and depression 90 Tab 2    spironolactone (ALDACTONE) 50 mg tablet TAKE 1 TABLET BY MOUTH EVERY DAY 90 Tab 3    metoprolol tartrate (LOPRESSOR) 50 mg tablet TAKE 1 TABLET BY MOUTH EVERY DAY 90 Tab 4    simvastatin (ZOCOR) 40 mg tablet TAKE 1 TABLET BY MOUTH NIGHTLY 90 Tab 3    ketoconazole (NIZORAL) 2 % topical cream APPLY A THIN LAYER TO AFFECTED AREA TWICE A DAY  4    hydrocortisone (HYTONE) 2.5 % ointment APPLY TO AFFECTED AREAS DAILY AS DIRECTED WITH KETOCONAZOLE CREAM  2    ammonium lactate (LAC-HYDRIN) 12 % lotion Use 1 Application to affected area Take As Needed.  loratadine (CLARITIN) 10 mg tablet Take 1 Tab by mouth daily. 90 Tab 4    diclofenac (VOLTAREN) 1 % gel APPLY QUARTER SIZED AMOUNT TO AFFECTED AREA(S) 3 TIMES DAILY  3    BABY ASPIRIN PO Take 81 mg by mouth. Allergies   Allergen Reactions    Valtrex [Valacyclovir] Itching and Swelling    Amoxicillin Rash    Ampicillin Rash    Codeine Nausea and Vomiting    Tussionex Other (comments)     Hallucinations    Valium [Diazepam] Vertigo       Family History   Problem Relation Age of Onset    Heart Disease Mother     Diabetes Mother         dialysis    Alcohol abuse Father     Heart defect Father     Alcohol abuse Sister     Heart defect Sister     Diabetes Maternal Aunt     Diabetes Maternal Uncle     Diabetes Maternal Grandmother     Stroke Maternal Grandmother     Diabetes Maternal Aunt     Diabetes Maternal Uncle     Diabetes Daughter     Cancer Daughter     Hypertension Daughter     Migraines Son      Social History     Tobacco Use    Smoking status: Never Smoker    Smokeless tobacco: Never Used   Substance Use Topics    Alcohol use: No         Whit Art MD

## 2021-08-09 NOTE — PATIENT INSTRUCTIONS

## 2021-08-09 NOTE — PROGRESS NOTES
Reviewed record in preparation for visit and have obtained necessary documentation. Jeff Acosta is a 67 y.o.  female presents today for office visit for   Chief Complaint   Patient presents with    Hypertension    Diabetes    Cholesterol Problem    Annual Wellness Visit   . Pt is  fasting. Patient is accompanied by self. I have received verbal consent from Jeff Acosta to discuss any/all medical information while they are present in the room. Pt is in Room # [address] preferred language for health care discussion is english/other. Is the patient using any DME equipment during OV? NO    Advance Directive:  1. Do you have an advance directive in place? Patient Reply: NO    2. If not, would you like material regarding how to put one in place? NO    Coordination of Care:  1. Have you been to the ER, urgent care clinic since your last visit? Hospitalized since your last visit? NO    2. Have you seen or consulted any other health care providers outside of the [address] [place] [address] since your last visit? Include any pap smears or colon screening. YES, Dr. Donnell Smith 7/2021 and Podiatry 7/2021, Balance Training 2x a week      Upcoming Appts  Yes Dermatology 8/2021. VORB: No orders of the defined types were placed in this encounter.  John Paul Lopez MD/Molly Pineda LPN    Jeff Acosta is due for:   Health Maintenance Due   Topic    Shingrix Vaccine Age 50> (2 of 2)    Foot Exam Q1     Medicare Yearly Exam      Health Maintenance reviewed and discussed per provider  Please order/place referral if appropriate.     Requested Prescriptions      No prescriptions requested or ordered in this encounter       Learning Assessment:  Learning Assessment 5/19/2015   PRIMARY LEARNER Patient   HIGHEST LEVEL OF EDUCATION - PRIMARY LEARNER  > 4 YEARS OF COLLEGE   BARRIERS PRIMARY LEARNER NONE   CO-LEARNER CAREGIVER No   PRIMARY LANGUAGE ENGLISH   LEARNER PREFERENCE PRIMARY READING DEMONSTRATION   ANSWERED BY Patient   RELATIONSHIP SELF     Depression Screening:  3 most recent [facility] [place] Screens 6/7/2021 4/28/2021 4/12/2021 2/4/2021 9/30/2020 8/27/2020 6/2/2020   PHQ Not Done Medical Reason (indicate in comments) - Medical Reason (indicate in comments) - Active Diagnosis of Depression or Bipolar Disorder (No Data) -   Little interest or pleasure in doing things Not at all Not at all Not at all Not at all - More than half the days Not at all   Feeling down, depressed, irritable, or hopeless Several days Not at all Several days More than half the days - More than half the days Not at all   Total Score PHQ 2 1 0 1 2 - 4 0   Trouble falling or staying asleep, or sleeping too much - - - - - - -   Feeling tired or having little energy - - - - - - -   Poor appetite, weight loss, or overeating - - - - - - -   Feeling bad about yourself - or that you are a failure or have let yourself or your family down - - - - - - -   Trouble concentrating on things such as school, work, reading, or watching TV - - - - - - -   Moving or speaking so slowly that other people could have noticed; or the opposite being so fidgety that others notice - - - - - - -   Thoughts of being better off dead, or hurting yourself in some way - - - - - - -   PHQ 9 Score - - - - - - -     Abuse Screening:  Abuse Screening Questionnaire 6/2/2020 11/13/2017 5/19/2015   Do you ever feel afraid of your partner? N N N   Are you in a relationship with someone who physically or mentally threatens you? N N N   Is it safe for you to go home? Luis Smith     Fall Risk  Fall Risk Assessment, last 12 mths 6/7/2021 4/28/2021 8/27/2020 6/2/2020 8/1/2019 4/8/2019 1/29/2019   Able to walk?  Yes Yes Yes Yes Yes Yes Yes   Fall in past 12 months? 0 0 No Yes Yes Yes No   Do you feel unsteady? 0 0 - - - - -   Are you worried about falling 0 0 - - - - -   Number of falls in past 12 months - - - 2 3 1 -   Fall with injury? - - - 0 0 1 -     Recent Travel Screening and Travel History documentation     Travel Screening      No screening recorded since 08/08/21 0000      Travel History   Travel since 07/09/21     No documented travel since 07/09/21

## 2021-09-10 DIAGNOSIS — Z76.0 MEDICATION REFILL: ICD-10-CM

## 2021-09-10 RX ORDER — SIMVASTATIN 40 MG/1
TABLET, FILM COATED ORAL
Qty: 90 TABLET | Refills: 3 | Status: SHIPPED | OUTPATIENT
Start: 2021-09-10 | End: 2022-07-14 | Stop reason: SINTOL

## 2021-09-16 DIAGNOSIS — Z76.0 MEDICATION REFILL: ICD-10-CM

## 2021-09-16 RX ORDER — METOPROLOL TARTRATE 50 MG/1
TABLET ORAL
Qty: 90 TABLET | Refills: 4 | Status: SHIPPED | OUTPATIENT
Start: 2021-09-16

## 2021-09-24 DIAGNOSIS — Z76.0 MEDICATION REFILL: ICD-10-CM

## 2021-09-24 RX ORDER — SPIRONOLACTONE 50 MG/1
TABLET, FILM COATED ORAL
Qty: 90 TABLET | Refills: 3 | Status: SHIPPED | OUTPATIENT
Start: 2021-09-24

## 2021-10-12 DIAGNOSIS — F32.89 OTHER DEPRESSION: ICD-10-CM

## 2021-10-12 DIAGNOSIS — F43.21 GRIEF: ICD-10-CM

## 2021-10-12 RX ORDER — ESCITALOPRAM OXALATE 10 MG/1
10 TABLET ORAL DAILY
Qty: 90 TABLET | Refills: 2 | Status: SHIPPED | OUTPATIENT
Start: 2021-10-12 | End: 2022-07-10

## 2021-12-14 ENCOUNTER — OFFICE VISIT (OUTPATIENT)
Dept: INTERNAL MEDICINE CLINIC | Age: 76
End: 2021-12-14
Payer: MEDICARE

## 2021-12-14 ENCOUNTER — HOSPITAL ENCOUNTER (OUTPATIENT)
Dept: LAB | Age: 76
Discharge: HOME | End: 2021-12-14
Payer: MEDICARE

## 2021-12-14 VITALS
BODY MASS INDEX: 29.67 KG/M2 | SYSTOLIC BLOOD PRESSURE: 118 MMHG | OXYGEN SATURATION: 98 % | TEMPERATURE: 97 F | WEIGHT: 173.8 LBS | HEIGHT: 64 IN | DIASTOLIC BLOOD PRESSURE: 61 MMHG | RESPIRATION RATE: 18 BRPM | HEART RATE: 58 BPM

## 2021-12-14 DIAGNOSIS — E03.9 HYPOTHYROIDISM, UNSPECIFIED TYPE: ICD-10-CM

## 2021-12-14 DIAGNOSIS — I11.9 HYPERTENSIVE HEART DISEASE WITHOUT HEART FAILURE: ICD-10-CM

## 2021-12-14 DIAGNOSIS — E55.9 VITAMIN D DEFICIENCY: ICD-10-CM

## 2021-12-14 DIAGNOSIS — E11.21 TYPE 2 DIABETES MELLITUS WITH NEPHROPATHY (HCC): Primary | ICD-10-CM

## 2021-12-14 DIAGNOSIS — F32.89 OTHER DEPRESSION: ICD-10-CM

## 2021-12-14 DIAGNOSIS — E11.21 TYPE 2 DIABETES MELLITUS WITH NEPHROPATHY (HCC): ICD-10-CM

## 2021-12-14 LAB
25(OH)D3 SERPL-MCNC: 49.3 NG/ML (ref 30–100)
ALBUMIN SERPL-MCNC: 4 G/DL (ref 3.4–5)
ALBUMIN/GLOB SERPL: 1.2 {RATIO} (ref 0.8–1.7)
ALP SERPL-CCNC: 56 U/L (ref 45–117)
ALT SERPL-CCNC: 14 U/L (ref 13–56)
ANION GAP SERPL CALC-SCNC: 7 MMOL/L (ref 3–18)
AST SERPL-CCNC: 23 U/L (ref 10–38)
BILIRUB SERPL-MCNC: 0.7 MG/DL (ref 0.2–1)
BUN SERPL-MCNC: 14 MG/DL (ref 7–18)
BUN/CREAT SERPL: 14 (ref 12–20)
CALCIUM SERPL-MCNC: 9.7 MG/DL (ref 8.5–10.1)
CHLORIDE SERPL-SCNC: 108 MMOL/L (ref 100–111)
CHOLEST SERPL-MCNC: 158 MG/DL
CO2 SERPL-SCNC: 27 MMOL/L (ref 21–32)
CREAT SERPL-MCNC: 1.02 MG/DL (ref 0.6–1.3)
CREAT UR-MCNC: 73 MG/DL (ref 30–125)
GLOBULIN SER CALC-MCNC: 3.4 G/DL (ref 2–4)
GLUCOSE SERPL-MCNC: 64 MG/DL (ref 74–99)
HBA1C MFR BLD: 5.7 % (ref 4.2–5.6)
HDLC SERPL-MCNC: 51 MG/DL (ref 40–60)
HDLC SERPL: 3.1 {RATIO} (ref 0–5)
LDLC SERPL CALC-MCNC: 91 MG/DL (ref 0–100)
LIPID PROFILE,FLP: NORMAL
MICROALBUMIN UR-MCNC: 0.51 MG/DL (ref 0–3)
MICROALBUMIN/CREAT UR-RTO: 7 MG/G (ref 0–30)
POTASSIUM SERPL-SCNC: 4.1 MMOL/L (ref 3.5–5.5)
PROT SERPL-MCNC: 7.4 G/DL (ref 6.4–8.2)
SODIUM SERPL-SCNC: 142 MMOL/L (ref 136–145)
T4 FREE SERPL-MCNC: 1.3 NG/DL (ref 0.7–1.5)
TRIGL SERPL-MCNC: 80 MG/DL (ref ?–150)
TSH SERPL DL<=0.05 MIU/L-ACNC: 1.7 UIU/ML (ref 0.36–3.74)
VLDLC SERPL CALC-MCNC: 16 MG/DL

## 2021-12-14 PROCEDURE — 82306 VITAMIN D 25 HYDROXY: CPT

## 2021-12-14 PROCEDURE — 1101F PT FALLS ASSESS-DOCD LE1/YR: CPT | Performed by: INTERNAL MEDICINE

## 2021-12-14 PROCEDURE — G8417 CALC BMI ABV UP PARAM F/U: HCPCS | Performed by: INTERNAL MEDICINE

## 2021-12-14 PROCEDURE — 80053 COMPREHEN METABOLIC PANEL: CPT

## 2021-12-14 PROCEDURE — 82570 ASSAY OF URINE CREATININE: CPT

## 2021-12-14 PROCEDURE — 83036 HEMOGLOBIN GLYCOSYLATED A1C: CPT

## 2021-12-14 PROCEDURE — 36415 COLL VENOUS BLD VENIPUNCTURE: CPT

## 2021-12-14 PROCEDURE — 80061 LIPID PANEL: CPT

## 2021-12-14 PROCEDURE — 99214 OFFICE O/P EST MOD 30 MIN: CPT | Performed by: INTERNAL MEDICINE

## 2021-12-14 PROCEDURE — G8510 SCR DEP NEG, NO PLAN REQD: HCPCS | Performed by: INTERNAL MEDICINE

## 2021-12-14 PROCEDURE — G8536 NO DOC ELDER MAL SCRN: HCPCS | Performed by: INTERNAL MEDICINE

## 2021-12-14 PROCEDURE — 84443 ASSAY THYROID STIM HORMONE: CPT

## 2021-12-14 PROCEDURE — G8399 PT W/DXA RESULTS DOCUMENT: HCPCS | Performed by: INTERNAL MEDICINE

## 2021-12-14 PROCEDURE — G8427 DOCREV CUR MEDS BY ELIG CLIN: HCPCS | Performed by: INTERNAL MEDICINE

## 2021-12-14 PROCEDURE — 1090F PRES/ABSN URINE INCON ASSESS: CPT | Performed by: INTERNAL MEDICINE

## 2021-12-14 NOTE — PROGRESS NOTES
Reviewed record in preparation for visit and have obtained necessary documentation. Latia Julian is a 67 y.o.  female presents today for office visit for   Chief Complaint   Patient presents with    Hypertension    Diabetes    Cholesterol Problem   . Pt is not fasting. Patient is accompanied by self. I have received verbal consent from Latia Julian to discuss any/all medical information while they are present in the room. Pt is in Room # Eladio Bautista preferred language for health care discussion is english/other. Is the patient using any DME equipment during OV? NO    Advance Directive:  1. Do you have an advance directive in place? Patient Reply: NO    2. If not, would you like material regarding how to put one in place? NO      1. \"Have you been to the ER, urgent care clinic since your last visit? Hospitalized since your last visit? \" No    2. \"Have you seen or consulted any other health care providers outside of the [address] [place] [address] since your last visit? \" Yes Orthopedic     3. For patients aged 39-70: Has the patient had a colonoscopy? NA based on age or sex     If the patient is female:    3. For patients aged 41-77: Has the patient had a mammogram within the past 2 years? NA based on age or sex    11. For patients aged 21-65: Has the patient had a pap smear? NA based on age or sex    Upcoming Appts  No    VORB: No orders of the defined types were placed in this encounter.  Theodor Tyrone Patterson MD/Molly Conteh LPN    Latia Julian is due for:   Health Maintenance Due   Topic    Shingrix Vaccine Age 50> (2 of 2)    Eye Exam Retinal or Dilated      Health Maintenance reviewed and discussed per provider  Please order/place referral if appropriate.     Requested Prescriptions      No prescriptions requested or ordered in this encounter       Learning Assessment:  Learning Assessment 5/19/2015   PRIMARY LEARNER Patient   HIGHEST LEVEL OF EDUCATION - PRIMARY LEARNER  > 4 YEARS OF COLLEGE   BARRIERS PRIMARY LEARNER NONE   CO-LEARNER CAREGIVER No   PRIMARY LANGUAGE ENGLISH   LEARNER PREFERENCE PRIMARY READING     DEMONSTRATION   ANSWERED BY Patient   RELATIONSHIP SELF     Depression Screening:  3 most recent PHQ Screens 12/14/2021 8/9/2021 6/7/2021 4/28/2021 4/12/2021 2/4/2021 9/30/2020   PHQ Not Done - - Medical Reason (indicate in comments) - Medical Reason (indicate in comments) - Active Diagnosis of Depression or Bipolar Disorder   Little interest or pleasure in doing things Not at all Not at all Not at all Not at all Not at all Not at all -   Feeling down, depressed, irritable, or hopeless Several days More than half the days Several days Not at all Several days More than half the days -   Total Score PHQ 2 1 2 1 0 1 2 -   Trouble falling or staying asleep, or sleeping too much - - - - - - -   Feeling tired or having little energy - - - - - - -   Poor appetite, weight loss, or overeating - - - - - - -   Feeling bad about yourself - or that you are a failure or have let yourself or your family down - - - - - - -   Trouble concentrating on things such as school, work, reading, or watching TV - - - - - - -   Moving or speaking so slowly that other people could have noticed; or the opposite being so fidgety that others notice - - - - - - -   Thoughts of being better off dead, or hurting yourself in some way - - - - - - -   PHQ 9 Score - - - - - - -     Abuse Screening:  Abuse Screening Questionnaire 8/9/2021 6/2/2020 11/13/2017 5/19/2015   Do you ever feel afraid of your partner? N N N N   Are you in a relationship with someone who physically or mentally threatens you? N N N N   Is it safe for you to go home? Kathe Dos Santos     Fall Risk  Fall Risk Assessment, last 12 mths 8/9/2021 6/7/2021 4/28/2021 8/27/2020 6/2/2020 8/1/2019 4/8/2019   Able to walk?  Yes Yes Yes Yes Yes Yes Yes   Fall in past 12 months? 0 0 0 No Yes Yes Yes   Do you feel unsteady? 0 0 0 - - - -   Are you worried about falling 0 0 0 - - - -   Number of falls in past 12 months - - - - 2 3 1   Fall with injury? - - - - 0 0 1     Recent Travel Screening and Travel History documentation     Travel Screening     No screening recorded since 12/13/21 0000     Travel History   Travel since 11/14/21    No documented travel since 11/14/21

## 2021-12-14 NOTE — PROGRESS NOTES
HISTORY OF PRESENT ILLNESS  Shawn Owusu is a 67 y.o. female. /61 (BP 1 Location: Left upper arm, BP Patient Position: Sitting, BP Cuff Size: Adult)   Pulse (!) 58   Temp 97 °F (36.1 °C) (Temporal)   Resp 18   Ht 5' 4\" (1.626 m)   Wt 173 lb 12.8 oz (78.8 kg)   SpO2 98%   BMI 29.83 kg/m²     Wt down 10 #  She is going to [employer]. Her target weight should be around 150. Current Outpatient Medications:     escitalopram oxalate (LEXAPRO) 10 mg tablet, TAKE 1 TAB BY MOUTH DAILY. INDICATIONS: GRIEF AND DEPRESSION, Disp: 90 Tablet, Rfl: 2    spironolactone (ALDACTONE) 50 mg tablet, TAKE 1 TABLET BY MOUTH EVERY DAY, Disp: 90 Tablet, Rfl: 3    metoprolol tartrate (LOPRESSOR) 50 mg tablet, TAKE 1 TABLET BY MOUTH EVERY DAY, Disp: 90 Tablet, Rfl: 4    simvastatin (ZOCOR) 40 mg tablet, TAKE 1 TABLET BY MOUTH NIGHTLY, Disp: 90 Tablet, Rfl: 3    levothyroxine (SYNTHROID) 100 mcg tablet, Take on tab by mouth daily for 6 days. Skip day 7. Indications: a condition with low thyroid hormone levels, Disp: 78 Tablet, Rfl: 5    albuterol (PROVENTIL HFA, VENTOLIN HFA, PROAIR HFA) 90 mcg/actuation inhaler, Take 1 Puff by inhalation every four (4) hours as needed for Wheezing., Disp: 1 Inhaler, Rfl: 1    ergocalciferol (ERGOCALCIFEROL) 1,250 mcg (50,000 unit) capsule, Take 1 Cap by mouth every thirty (30) days. Indications: vitamin D deficiency (high dose therapy), Disp: 3 Cap, Rfl: 3    ketoconazole (NIZORAL) 2 % topical cream, APPLY A THIN LAYER TO AFFECTED AREA TWICE A DAY, Disp: , Rfl: 4    hydrocortisone (HYTONE) 2.5 % ointment, APPLY TO AFFECTED AREAS DAILY AS DIRECTED WITH KETOCONAZOLE CREAM, Disp: , Rfl: 2    ammonium lactate (LAC-HYDRIN) 12 % lotion, Use 1 Application to affected area Take As Needed. , Disp: , Rfl:     loratadine (CLARITIN) 10 mg tablet, Take 1 Tab by mouth daily. , Disp: 90 Tab, Rfl: 4    diclofenac (VOLTAREN) 1 % gel, APPLY QUARTER SIZED AMOUNT TO AFFECTED AREA(S) 3 TIMES DAILY, Disp: , Rfl: 3    BABY ASPIRIN PO, Take 81 mg by mouth., Disp: , Rfl:       Hypertension   The history is provided by the patient. This is a chronic problem. The current episode started more than 1 week ago. The problem has not changed since onset. Pertinent negatives include no chest pain, no palpitations, no headaches, no dizziness and no shortness of breath. Diabetes  Pertinent negatives include no chest pain, no headaches and no shortness of breath. Cholesterol Problem  Pertinent negatives include no chest pain, no headaches and no shortness of breath. Review of Systems   Constitutional: Negative for chills and fever. HENT: Positive for congestion (sinus sxs). Respiratory: Negative for cough and shortness of breath. Cardiovascular: Negative for chest pain and palpitations. Genitourinary: Negative for frequency. Neurological: Negative for dizziness and headaches. Endo/Heme/Allergies: Negative for polydipsia. Physical Exam  Vitals and nursing note reviewed. Constitutional:       Appearance: Normal appearance. She is well-developed. HENT:      Head: Normocephalic and atraumatic. Cardiovascular:      Rate and Rhythm: Normal rate and regular rhythm. Pulmonary:      Effort: Pulmonary effort is normal.      Breath sounds: Normal breath sounds. Musculoskeletal:      Right lower leg: No edema. Left lower leg: No edema. Skin:     General: Skin is warm and dry. Neurological:      General: No focal deficit present. Mental Status: She is alert and oriented to person, place, and time. Psychiatric:         Mood and Affect: Mood normal.         Behavior: Behavior normal.         ASSESSMENT and PLAN    ICD-10-CM ICD-9-CM    1. Type 2 diabetes mellitus with nephropathy (HCC)  Z19.19 049.54 METABOLIC PANEL, COMPREHENSIVE     583.81 LIPID PANEL      HEMOGLOBIN A1C W/O EAG      MICROALBUMIN, UR, RAND W/ MICROALB/CREAT RATIO   2.  Hypertensive heart disease without heart failure  E10.3 455.37 METABOLIC PANEL, COMPREHENSIVE      LIPID PANEL   3. Vitamin D deficiency  E55.9 268.9 VITAMIN D, 25 HYDROXY   4. Hypothyroidism, unspecified type  E03.9 244.9 TSH AND FREE T4   5. Other depression  F32.89 311        BP looks good. Almost too good. Will stop the spironolactone for now    Mild depression sxs--continue lexapro through the winter. F/u 6 weeks to recheck BP.  To see if can stay off the spironolactone

## 2022-01-20 ENCOUNTER — OFFICE VISIT (OUTPATIENT)
Dept: INTERNAL MEDICINE CLINIC | Age: 77
End: 2022-01-20
Payer: MEDICARE

## 2022-01-20 VITALS
TEMPERATURE: 96.8 F | HEART RATE: 58 BPM | SYSTOLIC BLOOD PRESSURE: 144 MMHG | OXYGEN SATURATION: 98 % | BODY MASS INDEX: 29.43 KG/M2 | RESPIRATION RATE: 18 BRPM | HEIGHT: 64 IN | WEIGHT: 172.38 LBS | DIASTOLIC BLOOD PRESSURE: 62 MMHG

## 2022-01-20 DIAGNOSIS — I11.9 HYPERTENSIVE HEART DISEASE WITHOUT HEART FAILURE: Primary | ICD-10-CM

## 2022-01-20 DIAGNOSIS — E03.9 HYPOTHYROIDISM, UNSPECIFIED TYPE: ICD-10-CM

## 2022-01-20 DIAGNOSIS — N18.30 STAGE 3 CHRONIC KIDNEY DISEASE, UNSPECIFIED WHETHER STAGE 3A OR 3B CKD (HCC): ICD-10-CM

## 2022-01-20 DIAGNOSIS — F43.21 GRIEF: ICD-10-CM

## 2022-01-20 DIAGNOSIS — E11.21 TYPE 2 DIABETES MELLITUS WITH NEPHROPATHY (HCC): ICD-10-CM

## 2022-01-20 DIAGNOSIS — Z76.0 MEDICATION REFILL: ICD-10-CM

## 2022-01-20 PROCEDURE — 1101F PT FALLS ASSESS-DOCD LE1/YR: CPT | Performed by: INTERNAL MEDICINE

## 2022-01-20 PROCEDURE — G8510 SCR DEP NEG, NO PLAN REQD: HCPCS | Performed by: INTERNAL MEDICINE

## 2022-01-20 PROCEDURE — 99213 OFFICE O/P EST LOW 20 MIN: CPT | Performed by: INTERNAL MEDICINE

## 2022-01-20 PROCEDURE — G8417 CALC BMI ABV UP PARAM F/U: HCPCS | Performed by: INTERNAL MEDICINE

## 2022-01-20 PROCEDURE — 1090F PRES/ABSN URINE INCON ASSESS: CPT | Performed by: INTERNAL MEDICINE

## 2022-01-20 PROCEDURE — G8536 NO DOC ELDER MAL SCRN: HCPCS | Performed by: INTERNAL MEDICINE

## 2022-01-20 PROCEDURE — G8399 PT W/DXA RESULTS DOCUMENT: HCPCS | Performed by: INTERNAL MEDICINE

## 2022-01-20 PROCEDURE — G8427 DOCREV CUR MEDS BY ELIG CLIN: HCPCS | Performed by: INTERNAL MEDICINE

## 2022-01-20 NOTE — PROGRESS NOTES
1. \"Have you been to the ER, urgent care clinic since your last visit? Hospitalized since your last visit? \" No    2. \"Have you seen or consulted any other health care providers outside of the [address] [place] [address] since your last visit? \" Yes Hearing specialist and Nephrology    3. For patients aged 39-70: Has the patient had a colonoscopy / FIT/ Cologuard? NA - based on age      If the patient is female:    4. For patients aged 41-77: Has the patient had a mammogram within the past 2 years? NA - based on age or sex  See top three    5. For patients aged 21-65: Has the patient had a pap smear?  NA - based on age or sex

## 2022-01-20 NOTE — PROGRESS NOTES
HISTORY OF PRESENT ILLNESS  Benton Payne is a 67 y.o. female. BP (!) 144/62 (BP 1 Location: Left upper arm, BP Patient Position: Sitting, BP Cuff Size: Adult)   Pulse (!) 58   Temp 96.8 °F (36 °C) (Temporal)   Resp 18   Ht 5' 4\" (1.626 m)   Wt 172 lb 6 oz (78.2 kg)   SpO2 98%   BMI 29.59 kg/m²     We stopped the spironolactone last visit. Is now up and she has noted some ankle edema now        Hypertension   The history is provided by the patient. This is a chronic problem. The current episode started more than 1 week ago. Pertinent negatives include no palpitations. Ankle swelling   The history is provided by the patient. This is a chronic problem. The current episode started more than 1 week ago. The problem occurs daily. Review of Systems   Constitutional: Negative for chills and fever. Cardiovascular: Positive for leg swelling. Negative for palpitations. Psychiatric/Behavioral: Positive for depression (still grieving). Physical Exam  Vitals and nursing note reviewed. Constitutional:       Appearance: Normal appearance. She is well-developed. HENT:      Head: Normocephalic and atraumatic. Cardiovascular:      Rate and Rhythm: Normal rate and regular rhythm. Pulmonary:      Effort: Pulmonary effort is normal.      Breath sounds: Normal breath sounds. Musculoskeletal:      Right lower leg: Edema present. Left lower leg: Edema present. Skin:     General: Skin is warm and dry. Neurological:      General: No focal deficit present. Mental Status: She is alert and oriented to person, place, and time. Psychiatric:         Mood and Affect: Mood normal.         Behavior: Behavior normal.         ASSESSMENT and PLAN    ICD-10-CM ICD-9-CM    1. Hypertensive heart disease without heart failure  I11.9 402.90    2. Hypothyroidism, unspecified type  E03.9 244.9    3. Type 2 diabetes mellitus with nephropathy (HCC)  E11.21 250.40      583.81    4.  Stage 3 chronic kidney disease, unspecified whether stage 3a or 3b CKD ([facility] [address]  N18.30 585.3    5. Grief  F43.21 309.0    6. Medication refill  Z76.0 V68.1        BP back up  Restart spironolactone    Near the end of the visit, pt became tearful talking about her . Still struggling with his death last year.      Recheck 6 weeks

## 2022-03-10 ENCOUNTER — OFFICE VISIT (OUTPATIENT)
Dept: INTERNAL MEDICINE CLINIC | Age: 77
End: 2022-03-10
Payer: MEDICARE

## 2022-03-10 VITALS
SYSTOLIC BLOOD PRESSURE: 116 MMHG | BODY MASS INDEX: 29.02 KG/M2 | HEIGHT: 64 IN | HEART RATE: 55 BPM | OXYGEN SATURATION: 100 % | TEMPERATURE: 96.4 F | WEIGHT: 170 LBS | DIASTOLIC BLOOD PRESSURE: 71 MMHG | RESPIRATION RATE: 17 BRPM

## 2022-03-10 DIAGNOSIS — F32.89 OTHER DEPRESSION: ICD-10-CM

## 2022-03-10 DIAGNOSIS — E11.21 TYPE 2 DIABETES MELLITUS WITH NEPHROPATHY (HCC): ICD-10-CM

## 2022-03-10 DIAGNOSIS — Z76.0 MEDICATION REFILL: ICD-10-CM

## 2022-03-10 DIAGNOSIS — E55.9 VITAMIN D DEFICIENCY: ICD-10-CM

## 2022-03-10 DIAGNOSIS — E03.9 HYPOTHYROIDISM, UNSPECIFIED TYPE: ICD-10-CM

## 2022-03-10 DIAGNOSIS — I11.9 HYPERTENSIVE HEART DISEASE WITHOUT HEART FAILURE: Primary | ICD-10-CM

## 2022-03-10 PROCEDURE — G8399 PT W/DXA RESULTS DOCUMENT: HCPCS | Performed by: INTERNAL MEDICINE

## 2022-03-10 PROCEDURE — G8427 DOCREV CUR MEDS BY ELIG CLIN: HCPCS | Performed by: INTERNAL MEDICINE

## 2022-03-10 PROCEDURE — 99213 OFFICE O/P EST LOW 20 MIN: CPT | Performed by: INTERNAL MEDICINE

## 2022-03-10 PROCEDURE — 1101F PT FALLS ASSESS-DOCD LE1/YR: CPT | Performed by: INTERNAL MEDICINE

## 2022-03-10 PROCEDURE — G8510 SCR DEP NEG, NO PLAN REQD: HCPCS | Performed by: INTERNAL MEDICINE

## 2022-03-10 PROCEDURE — G8417 CALC BMI ABV UP PARAM F/U: HCPCS | Performed by: INTERNAL MEDICINE

## 2022-03-10 PROCEDURE — 1090F PRES/ABSN URINE INCON ASSESS: CPT | Performed by: INTERNAL MEDICINE

## 2022-03-10 PROCEDURE — G8536 NO DOC ELDER MAL SCRN: HCPCS | Performed by: INTERNAL MEDICINE

## 2022-03-10 RX ORDER — ERGOCALCIFEROL 1.25 MG/1
50000 CAPSULE ORAL
Qty: 3 CAPSULE | Refills: 3 | Status: SHIPPED | OUTPATIENT
Start: 2022-03-10

## 2022-03-10 NOTE — PROGRESS NOTES
HISTORY OF PRESENT ILLNESS  Lowell Corea is a 67 y.o. female. /71 (BP 1 Location: Left upper arm, BP Patient Position: Sitting, BP Cuff Size: Large adult)   Pulse (!) 55   Temp (!) 96.4 °F (35.8 °C) (Temporal)   Resp 17   Ht 5' 4\" (1.626 m)   Wt 170 lb (77.1 kg)   SpO2 100%   BMI 29.18 kg/m²     She spent 6 weeks in [place] with family    She will consider moving at some time in the [facility] but she still has a lot to do here related to her 's passing    Hypertension   The history is provided by the patient. This is a chronic problem. The current episode started more than 1 week ago. The problem has not changed since onset. Pertinent negatives include no chest pain, no palpitations and no shortness of breath. Medication Evaluation  Pertinent negatives include no chest pain and no shortness of breath. Review of Systems   Constitutional: Negative for chills and fever. Respiratory: Negative for shortness of breath. Cardiovascular: Negative for chest pain and palpitations. Psychiatric/Behavioral: Positive for depression (improving). Physical Exam  Vitals and nursing note reviewed. Constitutional:       Appearance: Normal appearance. She is well-developed. HENT:      Head: Normocephalic and atraumatic. Cardiovascular:      Rate and Rhythm: Normal rate and regular rhythm. Pulmonary:      Effort: Pulmonary effort is normal.      Breath sounds: Normal breath sounds. Musculoskeletal:      Right lower leg: No edema. Left lower leg: No edema. Skin:     General: Skin is warm and dry. Neurological:      General: No focal deficit present. Mental Status: She is alert and oriented to person, place, and time. Psychiatric:         Mood and Affect: Mood normal.         Behavior: Behavior normal.         ASSESSMENT and PLAN    ICD-10-CM ICD-9-CM    1. Hypertensive heart disease without heart failure  I11.9 402.90    2.  Type 2 diabetes mellitus with nephropathy ([facility] [address] E11.21 250.40      583.81    3. Other depression  F32.89 311    4. Hypothyroidism, unspecified type  E03.9 244.9    5. Vitamin D deficiency  E55.9 268.9    6.  Medication refill  Z76.0 V68.1 ergocalciferol (ERGOCALCIFEROL) 1,250 mcg (50,000 unit) capsule       BP controlled    DM has been controlled    Depression improving with time    Not due for lab today    F/u August for [address], HTN, DM, chol

## 2022-03-10 NOTE — PROGRESS NOTES
1. \"Have you been to the ER, urgent care clinic since your last visit? Hospitalized since your last visit? \" No    2. \"Have you seen or consulted any other health care providers outside of the [address] [place] [address] since your last visit? \" No     3. For patients aged 39-70: Has the patient had a colonoscopy / FIT/ Cologuard? NA - based on age      If the patient is female:    4. For patients aged 41-77: Has the patient had a mammogram within the past 2 years? NA - based on age or sex      11. For patients aged 21-65: Has the patient had a pap smear?  NA - based on age or sex

## 2022-03-18 PROBLEM — N18.30 CKD (CHRONIC KIDNEY DISEASE) STAGE 3, GFR 30-59 ML/MIN (HCC): Status: ACTIVE | Noted: 2020-06-02

## 2022-03-18 PROBLEM — E55.9 VITAMIN D DEFICIENCY: Status: ACTIVE | Noted: 2018-06-19

## 2022-03-19 PROBLEM — R42 VERTIGO: Status: ACTIVE | Noted: 2018-02-20

## 2022-03-19 PROBLEM — E11.21 TYPE 2 DIABETES MELLITUS WITH NEPHROPATHY (HCC): Status: ACTIVE | Noted: 2017-12-27

## 2022-04-11 DIAGNOSIS — E03.9 HYPOTHYROIDISM, UNSPECIFIED TYPE: ICD-10-CM

## 2022-04-11 DIAGNOSIS — Z76.0 MEDICATION REFILL: ICD-10-CM

## 2022-04-11 RX ORDER — LEVOTHYROXINE SODIUM 100 UG/1
TABLET ORAL
Qty: 90 TABLET | Refills: 3 | Status: SHIPPED | OUTPATIENT
Start: 2022-04-11

## 2022-05-25 NOTE — PROGRESS NOTES
PHYSICAL THERAPY - DAILY TREATMENT NOTE    Patient Name: Laura Raphael        Date: 2018  : 1945   YES Patient  Verified  Visit #:   6     Insurance: Payor: [employer] / Plan: [place] MED[place]ARE PART [place] & B / Product Type: Medicare /      In time: 10:40 am Out time: 11:11 am   Total Treatment Time: 31     Medicare Time Tracking (below)   Total Timed Codes (min):  31 1:1 Treatment Time: 31     TREATMENT AREA =  Difficulty in walking [R26.2]  Benign positional vertigo [H81.10]  Neck pain [M54.2]    SUBJECTIVE  Pain Level (on 0 to 10 scale): 0  / 10   Medication Changes/New allergies or changes in medical history, any new surgeries or procedures? NO    If yes, update Summary List   Subjective Functional Status/Changes:  []  No changes reported     \"I feel pretty good today. When I change positions. I sit on the side of the bed for a few minutes before I get up. \"  Pt reports no HEP for balance exercises.        OBJECTIVE  Modalities Rationale: n/a       8 min Therapeutic Exercise:  [x]  See flow sheet   Rationale:      increase ROM and increase strength to improve the patients ability to decrease fall risk     8 min Manual Therapy: Sit STM to Bilateral upper trapezius, Levator and Rhomboids; review self massage techniques;pt education in trigger points of UT and self Trigger point release   Rationale:      decrease pain, increase ROM and increase tissue extensibility to improve patient's ability to improve tissue mobility for functional ADLS      9 min Neuromuscular Re-ed: EO/EC static standing balance against wall with walker in front; static standing balance with arms @ side with walker in front   Rationale:    improve balance and increase proprioception to improve the patients ability to decrease fall risk    6 min Patient Education:  YES  Reviewed HEP   [x]  Progressed/Changed HEP based on:  Pt demonstrating good tolerance to exercise throughout session     Other Objective/Functional Measures: Add static standing in corner with walker in front EO/EC. Resume limits of stability for safety in static standing, review neutral spine posture. Updated written HEP, pt deferred use of UBE this session. Post Treatment Pain Level (on 0 to 10) scale:  0 / 10     ASSESSMENT  Assessment/Changes in Function:   Pt reporting no more than mild symptoms throughout session. Emphasized safety in sit to stand and ambulation with AD. Advanced static balance in corner and walker in front for safety. Patient education in use of self massage techniques, HEP for stretching and balance for progression of hip and ankle balance strategy. Pt observed ability to perform static standing without AD at end of session ~37 seconds while looking at written schedule. Pt performing during session static standing with walker in front ~ 5 seconds. Pt able to complete C/S retractions in sitting with good tolerance. AROM: Bilateral cervical rotation ~ 60% AROM. []  See Progress Note/Recertification   Patient will continue to benefit from skilled PT services to modify and progress therapeutic interventions, address functional mobility deficits, address ROM deficits and instruct in home and community integration to attain remaining goals. Progress toward goals / Updated goals:  Continue toward all current updated goals. Resume UBE as tolerated.      PLAN  []  Upgrade activities as tolerated YES Continue plan of care   []  Discharge due to :    []  Other:      Therapist: Ashley Castro PTA    Date: 5/30/2018 Time: 11:11  AM     Future Appointments  Date Time Provider Arielle Guerrero   6/6/2018 10:30 AM [facility]   6/13/2018 10:30 AM John Conn [facility]   6/19/2018 9:00 AM Dylon Mccoy MD [id number] [facility] CARDIOLOGY

## 2022-06-09 NOTE — PROGRESS NOTES
PHYSICAL THERAPY - DAILY TREATMENT NOTE    Patient Name: David Wasserman        Date: 2017  : 1945   YES Patient  Verified  Visit #:   6   of   8  Insurance: Payor: [employer] / Plan: [place] MEDICARE PART A & B / Product Type: Medicare /      In time: 3:09 Out time: 3:50   Total Treatment Time: 41     Medicare Time Tracking (below)   Total Timed Codes (min):  41 1:1 Treatment Time:  41     TREATMENT AREA =  Dizziness [R42]    SUBJECTIVE  Pain Level (on 0 to 10 scale):  6  / 10   Medication Changes/New allergies or changes in medical history, any new surgeries or procedures? NO    If yes, update Summary List   Subjective Functional Status/Changes:  []  No changes reported     On a family plan for the [facility] but too busy with work to get there. OBJECTIVE    41 min Therapeutic Exercise:  [x]  See flow sheet   Rationale:      increase ROM, increase strength, improve coordination, improve balance and increase proprioception to improve the patients ability to decrease fall risk               min Patient Education:  YES  Reviewed HEP   [x]  Progressed/Changed HEP based on: Sit in front of mirror and find vertical      Other Objective/Functional Measures: Added gait exercises. Nausea with gait backwards. Post Treatment Pain Level (on 0 to 10) scale:   6  / 10     ASSESSMENT  Assessment/Changes in Function:     Patient center of gravity off vertical to left. []  See Progress Note/Recertification   Patient will continue to benefit from skilled PT services to modify and progress therapeutic interventions, address functional mobility deficits, address ROM deficits, address strength deficits, address imbalance/dizziness and instruct in home and community integration to attain remaining goals   Progress toward goals / Updated goals:  1) Patient to improve score on FOTO to 70 indicating improved quality of life.    2) Patient to improve score on DGI to 9/24 indicating decreased fall risk with ambulation.     3) Patient to be independent  with HEP.    4) Patient to score 45 on [address] [place] [address] indicating improved function       PLAN  [x]  Upgrade activities as tolerated YES Continue plan of care   []  Discharge due to :    []  Other:      Therapist: Isai Box PT    Date: 11/28/2017 Time: 3:11 PM     Future Appointments  Date Time Provider Arielle Guerrero   12/5/2017 3:00 PM [facility]   12/12/2017 3:00 PM Isai Box, PT [facility]   2/20/2018 9:30 AM Eric Lantigua MD [id number] [facility] with patient

## 2022-07-10 DIAGNOSIS — F43.21 GRIEF: ICD-10-CM

## 2022-07-10 DIAGNOSIS — F32.89 OTHER DEPRESSION: ICD-10-CM

## 2022-07-10 RX ORDER — ESCITALOPRAM OXALATE 10 MG/1
10 TABLET ORAL DAILY
Qty: 90 TABLET | Refills: 2 | Status: SHIPPED | OUTPATIENT
Start: 2022-07-10

## 2022-07-14 ENCOUNTER — VIRTUAL VISIT (OUTPATIENT)
Dept: INTERNAL MEDICINE CLINIC | Age: 77
End: 2022-07-14
Payer: MEDICARE

## 2022-07-14 DIAGNOSIS — E03.9 HYPOTHYROIDISM, UNSPECIFIED TYPE: ICD-10-CM

## 2022-07-14 DIAGNOSIS — E11.21 TYPE 2 DIABETES MELLITUS WITH NEPHROPATHY (HCC): ICD-10-CM

## 2022-07-14 DIAGNOSIS — U07.1 COVID-19: Primary | ICD-10-CM

## 2022-07-14 DIAGNOSIS — F32.89 OTHER DEPRESSION: ICD-10-CM

## 2022-07-14 PROCEDURE — G8417 CALC BMI ABV UP PARAM F/U: HCPCS | Performed by: INTERNAL MEDICINE

## 2022-07-14 PROCEDURE — 1123F ACP DISCUSS/DSCN MKR DOCD: CPT | Performed by: INTERNAL MEDICINE

## 2022-07-14 PROCEDURE — 1101F PT FALLS ASSESS-DOCD LE1/YR: CPT | Performed by: INTERNAL MEDICINE

## 2022-07-14 PROCEDURE — 99215 OFFICE O/P EST HI 40 MIN: CPT | Performed by: INTERNAL MEDICINE

## 2022-07-14 PROCEDURE — 1090F PRES/ABSN URINE INCON ASSESS: CPT | Performed by: INTERNAL MEDICINE

## 2022-07-14 PROCEDURE — G8536 NO DOC ELDER MAL SCRN: HCPCS | Performed by: INTERNAL MEDICINE

## 2022-07-14 PROCEDURE — G8510 SCR DEP NEG, NO PLAN REQD: HCPCS | Performed by: INTERNAL MEDICINE

## 2022-07-14 PROCEDURE — G8427 DOCREV CUR MEDS BY ELIG CLIN: HCPCS | Performed by: INTERNAL MEDICINE

## 2022-07-14 PROCEDURE — G8399 PT W/DXA RESULTS DOCUMENT: HCPCS | Performed by: INTERNAL MEDICINE

## 2022-07-14 NOTE — PROGRESS NOTES
Progress Note    Patient: Dick Julian               Sex: female                  YOB: 1945      Age:  67 y.o.                    HPI:     Dick Julian is a 67 y.o. female who has been seen for viral illness. She is covid positive as well . She has chills and possibly some fever . Dick Julian, was evaluated through a synchronous (real-time) audio-video encounter. The patient (or guardian if applicable) is aware that this is a billable service, which includes applicable co-pays. This Virtual Visit was conducted with patient's (and/or legal guardian's) consent. The visit was conducted pursuant to the emergency declaration under the [address] [place] [address], [address] [place] [address] authority and the [employer] General Act. Patient identification was verified, and a caregiver was present when appropriate. The patient was located at: Home: [address] [place] [address]  The provider was located at:  Facility (Appt Department): [address] [place] [address]  [phone]      Past Medical History:   Diagnosis Date    Benign neoplasm of choroid, right     BPPV (benign paroxysmal positional vertigo) 01/12/2018    had ENG at Dr. Pippa Vargas office    Cataracts, bilateral     age related   Lilyan Montane CRI (chronic renal insufficiency)     stage 3    CTS (carpal tunnel syndrome) 10/25/2021    bilateral, mild on EMG/NCS    Diabetes mellitus, type 2 ([facility] [address] 2/2016    by HBA1c    Disease of inner ear     GERD (gastroesophageal reflux disease) 12/2/2011    Hypercholesterolemia     Hypertension     Hypothyroidism 12/2/2011       Past Surgical History:   Procedure Laterality Date    HX BREAST REDUCTION      bilateral    HX CARPAL TUNNEL RELEASE      bilateral     HX COLONOSCOPY  7/12/13    no f/u, Dr. Michelle Leigh      right tympanicplastty    HX HYSTERECTOMY      partial    HX ORTHOPAEDIC      right ulna    HX ORTHOPAEDIC      bilateral trigger finger releases    DC BIOPSY OF BREAST, INCISIONAL  1995    left       Family History   Problem Relation Age of Onset    Heart Disease Mother     Diabetes Mother         dialysis    Alcohol abuse Father     Heart defect Father     Alcohol abuse Sister     Heart defect Sister     Diabetes Maternal Aunt     Diabetes Maternal Uncle     Diabetes Maternal Grandmother     Stroke Maternal Grandmother     Diabetes Maternal Aunt     Diabetes Maternal Uncle     Diabetes Daughter     Cancer Daughter     Hypertension Daughter     Migraines Son        Social History     Socioeconomic History    Marital status:    Tobacco Use    Smoking status: Never Smoker    Smokeless tobacco: Never Used   Vaping Use    Vaping Use: Never used   Substance and Sexual Activity    Alcohol use: No    Drug use: No    Sexual activity: Not Currently         Current Outpatient Medications:     escitalopram oxalate (LEXAPRO) 10 mg tablet, TAKE 1 TAB BY MOUTH DAILY. INDICATIONS: GRIEF AND DEPRESSION, Disp: 90 Tablet, Rfl: 2    Synthroid 100 mcg tablet, TAKE 1 TABLET BY MOUTH ONCE DAILY FOR HYPOTHYROIDISM, Disp: 90 Tablet, Rfl: 3    ergocalciferol (ERGOCALCIFEROL) 1,250 mcg (50,000 unit) capsule, Take 1 Capsule by mouth every thirty (30) days.  Indications: vitamin D deficiency (high dose therapy), Disp: 3 Capsule, Rfl: 3    spironolactone (ALDACTONE) 50 mg tablet, TAKE 1 TABLET BY MOUTH EVERY DAY, Disp: 90 Tablet, Rfl: 3    metoprolol tartrate (LOPRESSOR) 50 mg tablet, TAKE 1 TABLET BY MOUTH EVERY DAY, Disp: 90 Tablet, Rfl: 4    simvastatin (ZOCOR) 40 mg tablet, TAKE 1 TABLET BY MOUTH NIGHTLY, Disp: 90 Tablet, Rfl: 3    albuterol (PROVENTIL HFA, VENTOLIN HFA, PROAIR HFA) 90 mcg/actuation inhaler, Take 1 Puff by inhalation every four (4) hours as needed for Wheezing., Disp: 1 Inhaler, Rfl: 1    ketoconazole (NIZORAL) 2 % topical cream, APPLY A THIN LAYER TO AFFECTED AREA TWICE A DAY, Disp: , Rfl: 4    hydrocortisone (HYTONE) 2.5 % ointment, APPLY TO AFFECTED AREAS DAILY AS DIRECTED WITH KETOCONAZOLE CREAM, Disp: , Rfl: 2    ammonium lactate (LAC-HYDRIN) 12 % lotion, Use 1 Application to affected area Take As Needed. , Disp: , Rfl:     loratadine (CLARITIN) 10 mg tablet, Take 1 Tab by mouth daily. , Disp: 90 Tab, Rfl: 4    diclofenac (VOLTAREN) 1 % gel, APPLY QUARTER SIZED AMOUNT TO AFFECTED AREA(S) 3 TIMES DAILY, Disp: , Rfl: 3    BABY ASPIRIN PO, Take 81 mg by mouth., Disp: , Rfl:      Allergies   Allergen Reactions    Valtrex [Valacyclovir] Itching and Swelling    Amoxicillin Rash    Ampicillin Rash    Codeine Nausea and Vomiting    Tussionex Other (comments)     Hallucinations    Valium [Diazepam] Vertigo       Review of Systems   Constitutional: Positive for chills, fever and malaise/fatigue. Respiratory: Negative for shortness of breath. Cardiovascular: Negative for chest pain. Gastrointestinal: Negative. Genitourinary: Positive for urgency. Negative for dysuria. Neurological: Positive for dizziness. Negative for loss of consciousness. Physical Exam:      There were no vitals taken for this visit. Physical Exam     Labs Reviewed:      Assessment/Plan       ICD-10-CM ICD-9-CM    1. COVID-19  U07.1 079.89 nirmatrelvir-ritonavir (PAXLOVID) 150 mg x 2- 100 mg tablet   2. Type 2 diabetes mellitus with nephropathy (HCC)  E11.21 250.40      583.81    3. Hypothyroidism, unspecified type  E03.9 244.9    4. Other depression  F32.89 311          reviewed meds that are contraindicated with paxlovid . She is only on  zocor which I told her to stop while on the paxlovid .     Diannah Fothergill, MD

## 2022-07-14 NOTE — PROGRESS NOTES
1. \"Have you been to the ER, urgent care clinic since your last visit? Hospitalized since your last visit? \" No    2. \"Have you seen or consulted any other health care providers outside of the [address] [place] [address] since your last visit? \" Yes Orthopedic hand surgeon- physical therapy      3. For patients aged 39-70: Has the patient had a colonoscopy / FIT/ Cologuard? NA - based on age      If the patient is female:    4. For patients aged 41-77: Has the patient had a mammogram within the past 2 years? NA - based on age or sex      11. For patients aged 21-65: Has the patient had a pap smear?  NA - based on age or sex

## 2022-08-11 ENCOUNTER — OFFICE VISIT (OUTPATIENT)
Dept: INTERNAL MEDICINE CLINIC | Age: 77
End: 2022-08-11
Payer: MEDICARE

## 2022-08-11 ENCOUNTER — HOSPITAL ENCOUNTER (OUTPATIENT)
Dept: LAB | Age: 77
Discharge: HOME | End: 2022-08-11
Payer: MEDICARE

## 2022-08-11 VITALS
RESPIRATION RATE: 20 BRPM | DIASTOLIC BLOOD PRESSURE: 75 MMHG | OXYGEN SATURATION: 99 % | BODY MASS INDEX: 28.34 KG/M2 | SYSTOLIC BLOOD PRESSURE: 132 MMHG | TEMPERATURE: 97 F | HEART RATE: 58 BPM | HEIGHT: 64 IN | WEIGHT: 166 LBS

## 2022-08-11 DIAGNOSIS — E55.9 VITAMIN D DEFICIENCY: ICD-10-CM

## 2022-08-11 DIAGNOSIS — E11.21 TYPE 2 DIABETES MELLITUS WITH NEPHROPATHY (HCC): ICD-10-CM

## 2022-08-11 DIAGNOSIS — E78.00 HYPERCHOLESTEROLEMIA: ICD-10-CM

## 2022-08-11 DIAGNOSIS — I10 PRIMARY HYPERTENSION: ICD-10-CM

## 2022-08-11 DIAGNOSIS — E03.9 HYPOTHYROIDISM, UNSPECIFIED TYPE: ICD-10-CM

## 2022-08-11 DIAGNOSIS — Z00.00 MEDICARE ANNUAL WELLNESS VISIT, SUBSEQUENT: Primary | ICD-10-CM

## 2022-08-11 DIAGNOSIS — K21.9 GASTROESOPHAGEAL REFLUX DISEASE, UNSPECIFIED WHETHER ESOPHAGITIS PRESENT: ICD-10-CM

## 2022-08-11 LAB
25(OH)D3 SERPL-MCNC: 61 NG/ML (ref 30–100)
ALBUMIN SERPL-MCNC: 3.8 G/DL (ref 3.4–5)
ALBUMIN/GLOB SERPL: 1.2 {RATIO} (ref 0.8–1.7)
ALP SERPL-CCNC: 49 U/L (ref 45–117)
ALT SERPL-CCNC: 16 U/L (ref 13–56)
ANION GAP SERPL CALC-SCNC: 8 MMOL/L (ref 3–18)
AST SERPL-CCNC: 15 U/L (ref 10–38)
BILIRUB SERPL-MCNC: 0.5 MG/DL (ref 0.2–1)
BUN SERPL-MCNC: 12 MG/DL (ref 7–18)
BUN/CREAT SERPL: 11 (ref 12–20)
CALCIUM SERPL-MCNC: 9.4 MG/DL (ref 8.5–10.1)
CHLORIDE SERPL-SCNC: 108 MMOL/L (ref 100–111)
CHOLEST SERPL-MCNC: 173 MG/DL
CO2 SERPL-SCNC: 27 MMOL/L (ref 21–32)
CREAT SERPL-MCNC: 1.13 MG/DL (ref 0.6–1.3)
GLOBULIN SER CALC-MCNC: 3.3 G/DL (ref 2–4)
GLUCOSE SERPL-MCNC: 61 MG/DL (ref 74–99)
HBA1C MFR BLD: 6.2 % (ref 4.2–5.6)
HDLC SERPL-MCNC: 52 MG/DL (ref 40–60)
HDLC SERPL: 3.3 {RATIO} (ref 0–5)
LDLC SERPL CALC-MCNC: 103.2 MG/DL (ref 0–100)
LIPID PROFILE,FLP: ABNORMAL
POTASSIUM SERPL-SCNC: 4 MMOL/L (ref 3.5–5.5)
PROT SERPL-MCNC: 7.1 G/DL (ref 6.4–8.2)
SODIUM SERPL-SCNC: 143 MMOL/L (ref 136–145)
T4 FREE SERPL-MCNC: 1.2 NG/DL (ref 0.7–1.5)
TRIGL SERPL-MCNC: 89 MG/DL (ref ?–150)
TSH SERPL DL<=0.05 MIU/L-ACNC: 5.19 UIU/ML (ref 0.36–3.74)
VLDLC SERPL CALC-MCNC: 17.8 MG/DL

## 2022-08-11 PROCEDURE — 1090F PRES/ABSN URINE INCON ASSESS: CPT | Performed by: INTERNAL MEDICINE

## 2022-08-11 PROCEDURE — 36415 COLL VENOUS BLD VENIPUNCTURE: CPT

## 2022-08-11 PROCEDURE — G8510 SCR DEP NEG, NO PLAN REQD: HCPCS | Performed by: INTERNAL MEDICINE

## 2022-08-11 PROCEDURE — G8536 NO DOC ELDER MAL SCRN: HCPCS | Performed by: INTERNAL MEDICINE

## 2022-08-11 PROCEDURE — G8399 PT W/DXA RESULTS DOCUMENT: HCPCS | Performed by: INTERNAL MEDICINE

## 2022-08-11 PROCEDURE — 1101F PT FALLS ASSESS-DOCD LE1/YR: CPT | Performed by: INTERNAL MEDICINE

## 2022-08-11 PROCEDURE — G0439 PPPS, SUBSEQ VISIT: HCPCS | Performed by: INTERNAL MEDICINE

## 2022-08-11 PROCEDURE — 80061 LIPID PANEL: CPT

## 2022-08-11 PROCEDURE — 83036 HEMOGLOBIN GLYCOSYLATED A1C: CPT

## 2022-08-11 PROCEDURE — 82306 VITAMIN D 25 HYDROXY: CPT

## 2022-08-11 PROCEDURE — 1123F ACP DISCUSS/DSCN MKR DOCD: CPT | Performed by: INTERNAL MEDICINE

## 2022-08-11 PROCEDURE — 99214 OFFICE O/P EST MOD 30 MIN: CPT | Performed by: INTERNAL MEDICINE

## 2022-08-11 PROCEDURE — G8417 CALC BMI ABV UP PARAM F/U: HCPCS | Performed by: INTERNAL MEDICINE

## 2022-08-11 PROCEDURE — G8427 DOCREV CUR MEDS BY ELIG CLIN: HCPCS | Performed by: INTERNAL MEDICINE

## 2022-08-11 PROCEDURE — 80053 COMPREHEN METABOLIC PANEL: CPT

## 2022-08-11 PROCEDURE — 84443 ASSAY THYROID STIM HORMONE: CPT

## 2022-08-11 NOTE — PROGRESS NOTES
Reviewed record in preparation for visit and have obtained necessary documentation. Luiza Downing is a 67 y.o.  female presents today for office visit for   Chief Complaint   Patient presents with    Annual Wellness Visit    Hypertension    Diabetes    Cholesterol Problem   . Pt is not fasting. Patient is accompanied by self. I have received verbal consent from Luiza Downing to discuss any/all medical information while they are present in the room. Pt is in Room # [facility] preferred language for health care discussion is english/other. Is the patient using any DME equipment during OV? NO    Advance Directive:  1. Do you have an advance directive in place? Patient Reply: NO    2. If not, would you like material regarding how to put one in place? NO      1. \"Have you been to the ER, urgent care clinic since your last visit? Hospitalized since your last visit? \" No    2. \"Have you seen or consulted any other health care providers outside of the [address] [place] [address] since your last visit? \" Yes Dr. Mary Jane Anne, Orthopedic  and Allergy Injection     3. For patients aged 39-70: Has the patient had a colonoscopy? NA - based on age     If the patient is female:    4. For patients aged 41-77: Has the patient had a mammogram within the past 2 years? NA - based on age    11. For patients aged 21-65: Has the patient had a pap smear? NA - based on age    Upcoming Appts  Yes Physical Therapy for Balance Training    VORB: No orders of the defined types were placed in this encounter.  Alejo Balbuena MD/Molly Guthrie LPN    Luiza Downing is due for:   Health Maintenance Due   Topic    Shingrix Vaccine Age 50> (2 of 2)    COVID-19 Vaccine (3 - Booster for Giovanni Lust series)    Eye Exam Retinal or Dilated     Foot Exam Q1     Medicare Yearly Exam      Health Maintenance reviewed and discussed per provider  Please order/place referral if appropriate.     Requested Prescriptions      No prescriptions requested or ordered in this encounter       Learning Assessment:  Learning Assessment 5/19/2015   PRIMARY LEARNER Patient   HIGHEST LEVEL OF EDUCATION - PRIMARY LEARNER  > 4 YEARS OF COLLEGE   BARRIERS PRIMARY LEARNER NONE   CO-LEARNER CAREGIVER No   PRIMARY LANGUAGE ENGLISH   LEARNER PREFERENCE PRIMARY READING     DEMONSTRATION   ANSWERED BY Patient   RELATIONSHIP SELF     Depression Screening:  3 most recent PHQ Screens 8/11/2022 7/14/2022 3/10/2022 1/20/2022 12/14/2021 8/9/2021 6/7/2021   PHQ Not Done - - - - - - Medical Reason (indicate in comments)   Little interest or pleasure in doing things Not at all Not at all Not at all Not at all Not at all Not at all Not at all   Feeling down, depressed, irritable, or hopeless Several days Not at all Several days Several days Several days More than half the days Several days   Total Score PHQ 2 1 0 1 1 1 2 1   Trouble falling or staying asleep, or sleeping too much Not at all - - - - - -   Feeling tired or having little energy More than half the days - - - - - -   Poor appetite, weight loss, or overeating Several days - - - - - -   Feeling bad about yourself - or that you are a failure or have let yourself or your family down Not at all - - - - - -   Trouble concentrating on things such as school, work, reading, or watching TV Not at all - - - - - -   Moving or speaking so slowly that other people could have noticed; or the opposite being so fidgety that others notice Not at all - - - - - -   Thoughts of being better off dead, or hurting yourself in some way Not at all - - - - - -   PHQ 9 Score 4 - - - - - -   How difficult have these problems made it for you to do your work, take care of your home and get along with others Not difficult at all - - - - - -     Abuse Screening:  Abuse Screening Questionnaire 8/9/2021 6/2/2020 11/13/2017 5/19/2015   Do you ever feel afraid of your partner? N N N N   Are you in a relationship with someone who physically or mentally threatens you?  Jj Quezada N N N   Is it safe for you to go home? Adalberto Party     Fall Risk  Fall Risk Assessment, last 12 mths 8/11/2022 8/9/2021 6/7/2021 4/28/2021 8/27/2020 6/2/2020 8/1/2019   Able to walk? Yes Yes Yes Yes Yes Yes Yes   Fall in past 12 months? 1 0 0 0 No Yes Yes   Do you feel unsteady? 1 0 0 0 - - -   Are you worried about falling 1 0 0 0 - - -   Is TUG test greater than 12 seconds? 0 - - - - - -   Is the gait abnormal? 1 - - - - - -   Number of falls in past 12 months 1 - - - - 2 3   Fall with injury?  0 - - - - 0 0     Recent Travel Screening and Travel History documentation     Travel Screening     No screening recorded since 08/10/22 0000       Travel History   Travel since 07/11/22    No documented travel since 07/11/22 Other/Normal

## 2022-08-11 NOTE — PROGRESS NOTES
This is the Subsequent Medicare Annual Wellness Exam, performed 12 months or more after the Initial AWV or the last Subsequent AWV    I have reviewed the patient's medical history in detail and updated the computerized patient record. /75 (BP 1 Location: Right arm, BP Patient Position: Sitting, BP Cuff Size: Small adult)   Pulse (!) 58   Temp 97 °F (36.1 °C) (Temporal)   Resp 20   Ht 5' 4\" (1.626 m)   Wt 166 lb (75.3 kg)   SpO2 99%   BMI 28.49 kg/m²       Assessment/Plan   Education and counseling provided:  Are appropriate based on today's review and evaluation    1. Medicare annual wellness visit, subsequent     Depression Risk Factor Screening     3 most recent PHQ Screens 8/11/2022   PHQ Not Done -   Little interest or pleasure in doing things Not at all   Feeling down, depressed, irritable, or hopeless Several days   Total Score PHQ 2 1   Trouble falling or staying asleep, or sleeping too much Not at all   Feeling tired or having little energy More than half the days   Poor appetite, weight loss, or overeating Several days   Feeling bad about yourself - or that you are a failure or have let yourself or your family down Not at all   Trouble concentrating on things such as school, work, reading, or watching TV Not at all   Moving or speaking so slowly that other people could have noticed; or the opposite being so fidgety that others notice Not at all   Thoughts of being better off dead, or hurting yourself in some way Not at all   PHQ 9 Score 4   How difficult have these problems made it for you to do your work, take care of your home and get along with others Not difficult at all       Alcohol & Drug Abuse Risk Screen    Do you average more than 1 drink per night or more than 7 drinks a week:  No    On any one occasion in the past three months have you have had more than 3 drinks containing alcohol:  No          Functional Ability and Level of Safety    Hearing:  wears hearing aides.  They are being adjusted       Activities of Daily Living: The home contains: grab bars  Patient does total self care      Ambulation: with mild difficulty     Fall Risk:  Fall Risk Assessment, last 12 mths 8/11/2022   Able to walk? Yes   Fall in past 12 months? 1   Do you feel unsteady? 1   Are you worried about falling 1   Is TUG test greater than 12 seconds? 0   Is the gait abnormal? 1   Number of falls in past 12 months 1   Fall with injury?  0      Abuse Screen:  Patient is not abused       Cognitive Screening    Has your family/caregiver stated any concerns about your memory: no     Cognitive Screening: Normal - Mini Cog Test    Health Maintenance Due     Health Maintenance Due   Topic Date Due    Shingrix Vaccine Age 49> (2 of 2) 01/08/2021    COVID-19 Vaccine (3 - Booster for Lauryn Dues series) 08/06/2021    Eye Exam Retinal or Dilated  12/06/2021    Foot Exam Q1  08/09/2022       Patient Care Team   Patient Care Team:  Gloria Hadley MD as PCP - General (Internal Medicine Physician)  Gloria Hadley MD as PCP - [facility] Empaneled Provider  Bessie Schaeffer DPM (Podiatry)  Breanna Gallagher MD (Otolaryngology)  Skyler Jiménez MD as Consulting Provider (Ophthalmology)  Henny Perry MD as Consulting Provider (Nephrology)    History     Patient Active Problem List   Diagnosis Code    Post concussion syndrome F07.81    HCVD (hypertensive cardiovascular disease) I11.9    GERD (gastroesophageal reflux disease) K21.9    Hypothyroidism E03.9    Hypercholesterolemia E78.00    Type 2 diabetes mellitus with nephropathy (HCC) E11.21    Vertigo R42    Vitamin D deficiency E55.9    CKD (chronic kidney disease) stage 3, GFR 30-59 ml/min ([facility] [address] N18.30     Past Medical History:   Diagnosis Date    Benign neoplasm of choroid, right     BPPV (benign paroxysmal positional vertigo) 01/12/2018    had ENG at Dr. Kaur Potter office    Cataracts, bilateral     age related    COVID-19 07/14/2022    CRI (chronic renal insufficiency) stage 3    CTS (carpal tunnel syndrome) 10/25/2021    bilateral, mild on EMG/NCS    Diabetes mellitus, type 2 ([address] 02/2016    by HBA1c    Disease of inner ear     GERD (gastroesophageal reflux disease) 12/02/2011    Hypercholesterolemia     Hypertension     Hypothyroidism 12/02/2011      Past Surgical History:   Procedure Laterality Date    HX BREAST REDUCTION      bilateral    HX CARPAL TUNNEL RELEASE      bilateral     HX COLONOSCOPY  7/12/13    no f/u, Dr. Tracey Sinha      right tympanicplastty    HX HYSTERECTOMY      partial    HX ORTHOPAEDIC      right ulna    HX ORTHOPAEDIC      bilateral trigger finger releases    UT BIOPSY OF BREAST, INCISIONAL  1995    left     Current Outpatient Medications   Medication Sig Dispense Refill    escitalopram oxalate (LEXAPRO) 10 mg tablet TAKE 1 TAB BY MOUTH DAILY. INDICATIONS: GRIEF AND DEPRESSION 90 Tablet 2    ergocalciferol (ERGOCALCIFEROL) 1,250 mcg (50,000 unit) capsule Take 1 Capsule by mouth every thirty (30) days. Indications: vitamin D deficiency (high dose therapy) 3 Capsule 3    spironolactone (ALDACTONE) 50 mg tablet TAKE 1 TABLET BY MOUTH EVERY DAY 90 Tablet 3    metoprolol tartrate (LOPRESSOR) 50 mg tablet TAKE 1 TABLET BY MOUTH EVERY DAY 90 Tablet 4    albuterol (PROVENTIL HFA, VENTOLIN HFA, PROAIR HFA) 90 mcg/actuation inhaler Take 1 Puff by inhalation every four (4) hours as needed for Wheezing. 1 Inhaler 1    ketoconazole (NIZORAL) 2 % topical cream APPLY A THIN LAYER TO AFFECTED AREA TWICE A DAY  4    hydrocortisone (HYTONE) 2.5 % ointment APPLY TO AFFECTED AREAS DAILY AS DIRECTED WITH KETOCONAZOLE CREAM  2    ammonium lactate (LAC-HYDRIN) 12 % lotion Use 1 Application to affected area Take As Needed. loratadine (CLARITIN) 10 mg tablet Take 1 Tab by mouth daily.  90 Tab 4    diclofenac (VOLTAREN) 1 % gel APPLY QUARTER SIZED AMOUNT TO AFFECTED AREA(S) 3 TIMES DAILY  3    Synthroid 100 mcg tablet TAKE 1 TABLET BY MOUTH ONCE DAILY FOR HYPOTHYROIDISM 90 Tablet 3    BABY ASPIRIN PO Take 81 mg by mouth. (Patient not taking: Reported on 8/11/2022)       Allergies   Allergen Reactions    Valtrex [Valacyclovir] Itching and Swelling    Amoxicillin Rash    Ampicillin Rash    Codeine Nausea and Vomiting    Tussionex Other (comments)     Hallucinations    Valium [Diazepam] Vertigo       Family History   Problem Relation Age of Onset    Heart Disease Mother     Diabetes Mother         dialysis    Alcohol abuse Father     Heart defect Father     Alcohol abuse Sister     Heart defect Sister     Diabetes Maternal Aunt     Diabetes Maternal Uncle     Diabetes Maternal Grandmother     Stroke Maternal Grandmother     Diabetes Maternal Aunt     Diabetes Maternal Uncle     Diabetes Daughter     Cancer Daughter     Hypertension Daughter     Migraines Son      Social History     Tobacco Use    Smoking status: Never     Passive exposure: Never    Smokeless tobacco: Never   Substance Use Topics    Alcohol use:  No

## 2022-08-11 NOTE — PATIENT INSTRUCTIONS
Medicare Wellness Visit, Female     The best way to live healthy is to have a lifestyle where you eat a well-balanced diet, exercise regularly, limit alcohol use, and quit all forms of tobacco/nicotine, if applicable. Regular preventive services are another way to keep healthy. Preventive services (vaccines, screening tests, monitoring & exams) can help personalize your care plan, which helps you manage your own care. Screening tests can find health problems at the earliest stages, when they are easiest to treat. [facility] follows the current, evidence-based guidelines published by the [facility] ([facility]) when recommending preventive services for our patients. Because we follow these guidelines, sometimes recommendations change over time as research supports it. (For example, mammograms used to be recommended annually. Even though Medicare will still pay for an annual mammogram, the newer guidelines recommend a mammogram every two years for women of average risk). Of course, you and your doctor may decide to screen more often for some diseases, based on your risk and your co-morbidities (chronic disease you are already diagnosed with). Preventive services for you include:  - Medicare offers their members a free annual wellness visit, which is time for you and your primary care provider to discuss and plan for your preventive service needs. Take advantage of this benefit every year!  -All adults over the age of 72 should receive the recommended pneumonia vaccines. Current USPSTF guidelines recommend a series of two vaccines for the best pneumonia protection.   -All adults should have a flu vaccine yearly and a tetanus vaccine every 10 years.   -All adults age 48 and older should receive the shingles vaccines (series of two vaccines).       -All adults age 38-68 who are overweight should have a diabetes screening test once every three years.   -All adults born between [birth year] and 1965 should be screened once for Hepatitis C.  -Other screening tests and preventive services for persons with diabetes include: an eye exam to screen for diabetic retinopathy, a kidney function test, a foot exam, and stricter control over your cholesterol.   -Cardiovascular screening for adults with routine risk involves an electrocardiogram (ECG) at intervals determined by your doctor.   -Colorectal cancer screenings should be done for adults age 54-65 with no increased risk factors for colorectal cancer. There are a number of acceptable methods of screening for this type of cancer. Each test has its own benefits and drawbacks. Discuss with your doctor what is most appropriate for you during your annual wellness visit. The different tests include: colonoscopy (considered the best screening method), a fecal occult blood test, a fecal DNA test, and sigmoidoscopy.    -A bone mass density test is recommended when a woman turns 65 to screen for osteoporosis. This test is only recommended one time, as a screening. Some providers will use this same test as a disease monitoring tool if you already have osteoporosis. -Breast cancer screenings are recommended every other year for women of normal risk, age 54-69.  -Cervical cancer screenings for women over age 72 are only recommended with certain risk factors.      Here is a list of your current Health Maintenance items (your personalized list of preventive services) with a due date:  Health Maintenance Due   Topic Date Due    Shingles Vaccine (2 of 2) 01/08/2021    COVID-19 Vaccine (3 - Booster for Moderna series) 08/06/2021    Eye Exam  12/06/2021    Diabetic Foot Care  08/09/2022

## 2022-08-11 NOTE — PROGRESS NOTES
HISTORY OF PRESENT ILLNESS  Rowena Martin is a 67 y.o. female. /75 (BP 1 Location: Right arm, BP Patient Position: Sitting, BP Cuff Size: Small adult)   Pulse (!) 58   Temp 97 °F (36.1 °C) (Temporal)   Resp 20   Ht 5' 4\" (1.626 m)   Wt 166 lb (75.3 kg)   SpO2 99%   BMI 28.49 kg/m²     Wt down another 4#, 12 in a year. She goes to [employer]    Hypertension   The history is provided by the Patient. This is a chronic problem. The current episode started more than 1 week ago. Associated symptoms include malaise/fatigue (left over from COVID). Pertinent negatives include no chest pain, no palpitations, no headaches, no dizziness and no shortness of breath. Risk factors include dyslipidemia and diabetes mellitus. Diabetes  The history is provided by the Patient. This is a chronic problem. The current episode started more than 1 week ago. The problem occurs daily. Pertinent negatives include no chest pain, no headaches and no shortness of breath. Review of Systems   Constitutional:  Positive for malaise/fatigue (left over from [facility]). HENT:  Positive for congestion (usual allergy congestion. Taste is better but smell still has a way to go to recover). Respiratory:  Negative for shortness of breath. Cardiovascular:  Negative for chest pain and palpitations. Neurological:  Negative for dizziness and headaches. Physical Exam  Vitals and nursing note reviewed. Constitutional:       Appearance: Normal appearance. She is well-developed. HENT:      Head: Normocephalic and atraumatic. Cardiovascular:      Rate and Rhythm: Normal rate and regular rhythm. Pulmonary:      Effort: Pulmonary effort is normal.      Breath sounds: Normal breath sounds. Musculoskeletal:      Right lower leg: No edema. Left lower leg: No edema. Skin:     General: Skin is warm and dry. Neurological:      General: No focal deficit present.       Mental Status: She is alert and oriented to person, place, and time. Comments: Diabetic foot exam:     Left Foot:   Visual Exam: bunion   Pulse DP: 2+ (normal)   Filament test: reduced sensation    Vibratory sensation: normal      Right Foot:   Visual Exam: bunion, hammertoes   Pulse DP: 2+ (normal)   Filament test: normal sensation    Vibratory sensation: diminished       Psychiatric:         Mood and Affect: Mood normal.         Behavior: Behavior normal.       ASSESSMENT and PLAN    ICD-10-CM ICD-9-CM              2. Primary hypertension  S57 628.5 METABOLIC PANEL, COMPREHENSIVE      3. Type 2 diabetes mellitus with nephropathy (HCC)  A22.36 246.64 METABOLIC PANEL, COMPREHENSIVE     583.81 LIPID PANEL      HEMOGLOBIN A1C W/O EAG       DIABETES FOOT EXAM      4. Gastroesophageal reflux disease, unspecified whether esophagitis present  K21.9 530.81       5. Hypercholesterolemia  E78.00 272.0 LIPID PANEL      6. Hypothyroidism, unspecified type  E03.9 244.9 TSH AND FREE T4      7.  Vitamin D deficiency  E55.9 268.9 VITAMIN D, 25 HYDROXY      Doing well except for residual fatigue from COVID  BP controlled  DM has been controlled  Update lab today    F/u 6 months for HTN, DM, chol, thyroid

## 2022-10-09 RX ORDER — SIMVASTATIN 40 MG/1
TABLET, FILM COATED ORAL
Qty: 90 TABLET | Refills: 2 | Status: SHIPPED | OUTPATIENT
Start: 2022-10-09

## 2022-12-02 DIAGNOSIS — Z76.0 MEDICATION REFILL: ICD-10-CM

## 2022-12-02 RX ORDER — SPIRONOLACTONE 50 MG/1
TABLET, FILM COATED ORAL
Qty: 90 TABLET | Refills: 2 | Status: SHIPPED | OUTPATIENT
Start: 2022-12-02

## 2022-12-05 DIAGNOSIS — Z76.0 MEDICATION REFILL: ICD-10-CM

## 2022-12-05 RX ORDER — METOPROLOL TARTRATE 50 MG/1
TABLET ORAL
Qty: 90 TABLET | Refills: 1 | Status: SHIPPED | OUTPATIENT
Start: 2022-12-05

## 2023-01-03 LAB — CREATININE, EXTERNAL: 1

## 2023-01-24 ENCOUNTER — HOSPITAL ENCOUNTER (OUTPATIENT)
Dept: LAB | Age: 78
Discharge: HOME | End: 2023-01-24
Payer: MEDICARE

## 2023-01-24 ENCOUNTER — OFFICE VISIT (OUTPATIENT)
Dept: INTERNAL MEDICINE CLINIC | Age: 78
End: 2023-01-24
Payer: MEDICARE

## 2023-01-24 VITALS
RESPIRATION RATE: 15 BRPM | HEIGHT: 64 IN | SYSTOLIC BLOOD PRESSURE: 120 MMHG | OXYGEN SATURATION: 98 % | DIASTOLIC BLOOD PRESSURE: 55 MMHG | BODY MASS INDEX: 28.41 KG/M2 | WEIGHT: 166.38 LBS | HEART RATE: 49 BPM | TEMPERATURE: 97.2 F

## 2023-01-24 DIAGNOSIS — E11.21 TYPE 2 DIABETES MELLITUS WITH NEPHROPATHY (HCC): ICD-10-CM

## 2023-01-24 DIAGNOSIS — E78.00 HYPERCHOLESTEROLEMIA: ICD-10-CM

## 2023-01-24 DIAGNOSIS — E55.9 VITAMIN D DEFICIENCY: ICD-10-CM

## 2023-01-24 DIAGNOSIS — I10 PRIMARY HYPERTENSION: ICD-10-CM

## 2023-01-24 DIAGNOSIS — E03.9 ACQUIRED HYPOTHYROIDISM: ICD-10-CM

## 2023-01-24 DIAGNOSIS — N18.30 STAGE 3 CHRONIC KIDNEY DISEASE, UNSPECIFIED WHETHER STAGE 3A OR 3B CKD (HCC): ICD-10-CM

## 2023-01-24 DIAGNOSIS — I10 PRIMARY HYPERTENSION: Primary | ICD-10-CM

## 2023-01-24 LAB
CHOLEST SERPL-MCNC: 176 MG/DL
HBA1C MFR BLD: 6.1 % (ref 4.2–5.6)
HDLC SERPL-MCNC: 54 MG/DL (ref 40–60)
HDLC SERPL: 3.3 (ref 0–5)
LDLC SERPL CALC-MCNC: 104.4 MG/DL (ref 0–100)
LIPID PROFILE,FLP: ABNORMAL
T4 FREE SERPL-MCNC: 1.3 NG/DL (ref 0.7–1.5)
TRIGL SERPL-MCNC: 88 MG/DL (ref ?–150)
TSH SERPL DL<=0.05 MIU/L-ACNC: 0.95 UIU/ML (ref 0.36–3.74)
VLDLC SERPL CALC-MCNC: 17.6 MG/DL

## 2023-01-24 PROCEDURE — G8399 PT W/DXA RESULTS DOCUMENT: HCPCS | Performed by: INTERNAL MEDICINE

## 2023-01-24 PROCEDURE — 1123F ACP DISCUSS/DSCN MKR DOCD: CPT | Performed by: INTERNAL MEDICINE

## 2023-01-24 PROCEDURE — 84439 ASSAY OF FREE THYROXINE: CPT

## 2023-01-24 PROCEDURE — 3074F SYST BP LT 130 MM HG: CPT | Performed by: INTERNAL MEDICINE

## 2023-01-24 PROCEDURE — 36415 COLL VENOUS BLD VENIPUNCTURE: CPT

## 2023-01-24 PROCEDURE — 83036 HEMOGLOBIN GLYCOSYLATED A1C: CPT

## 2023-01-24 PROCEDURE — G8417 CALC BMI ABV UP PARAM F/U: HCPCS | Performed by: INTERNAL MEDICINE

## 2023-01-24 PROCEDURE — G8510 SCR DEP NEG, NO PLAN REQD: HCPCS | Performed by: INTERNAL MEDICINE

## 2023-01-24 PROCEDURE — 1101F PT FALLS ASSESS-DOCD LE1/YR: CPT | Performed by: INTERNAL MEDICINE

## 2023-01-24 PROCEDURE — 1090F PRES/ABSN URINE INCON ASSESS: CPT | Performed by: INTERNAL MEDICINE

## 2023-01-24 PROCEDURE — 80061 LIPID PANEL: CPT

## 2023-01-24 PROCEDURE — 3078F DIAST BP <80 MM HG: CPT | Performed by: INTERNAL MEDICINE

## 2023-01-24 PROCEDURE — G8427 DOCREV CUR MEDS BY ELIG CLIN: HCPCS | Performed by: INTERNAL MEDICINE

## 2023-01-24 PROCEDURE — G8536 NO DOC ELDER MAL SCRN: HCPCS | Performed by: INTERNAL MEDICINE

## 2023-01-24 PROCEDURE — 99214 OFFICE O/P EST MOD 30 MIN: CPT | Performed by: INTERNAL MEDICINE

## 2023-01-24 RX ORDER — OMEPRAZOLE 40 MG/1
CAPSULE, DELAYED RELEASE ORAL
COMMUNITY
Start: 2023-01-12

## 2023-01-24 NOTE — PROGRESS NOTES
HISTORY OF PRESENT ILLNESS  Puja Berry is a 65 y.o. female. BP (!) 120/55 (BP 1 Location: Right arm, BP Patient Position: Sitting, BP Cuff Size: Adult) Comment: metoprolol and nessa. Pulse (!) 49   Temp 97.2 °F (36.2 °C) (Temporal)   Resp 15   Ht 5' 4\" (1.626 m)   Wt 166 lb 6 oz (75.5 kg)   SpO2 98%   BMI 28.56 kg/m²     She had a wellness check  1/13/23. Her chol was 150, HDL 56,  HBA1c 6.4    On 1/3/23 via Dr. Jono Caballero, her non-fasting glucose was 144 (just before lunch)  GFR is 55      Hypertension   The history is provided by the Patient. This is a chronic problem. The current episode started more than 1 week ago. The problem has not changed since onset. Risk factors include diabetes mellitus. Diabetes  The history is provided by the Patient. This is a chronic problem. The current episode started more than 1 week ago. The problem occurs daily. Cholesterol Problem  The history is provided by the Patient. This is a chronic problem. The current episode started more than 1 week ago. Thyroid Problem  The history is provided by the Patient. This is a chronic problem. The problem occurs daily. Review of Systems   Constitutional: Negative. Respiratory: Negative. Cardiovascular: Negative. Psychiatric/Behavioral:  Positive for depression. Physical Exam  Vitals and nursing note reviewed. Constitutional:       Appearance: Normal appearance. She is well-developed. HENT:      Head: Normocephalic and atraumatic. Cardiovascular:      Rate and Rhythm: Normal rate and regular rhythm. Pulmonary:      Effort: Pulmonary effort is normal.      Breath sounds: Normal breath sounds. Musculoskeletal:      Right lower leg: No edema. Left lower leg: No edema. Skin:     General: Skin is warm and dry. Neurological:      General: No focal deficit present. Mental Status: She is alert and oriented to person, place, and time.    Psychiatric:         Mood and Affect: Mood normal. Behavior: Behavior normal.       ASSESSMENT and PLAN    ICD-10-CM ICD-9-CM    1. Primary hypertension  I10 401.9 LIPID PANEL            2. Type 2 diabetes mellitus with nephropathy (HCC)  E11.21 250.40 LIPID PANEL     583.81 HEMOGLOBIN A1C W/O EAG                  3. Hypercholesterolemia  E78.00 272.0 LIPID PANEL      4. Acquired hypothyroidism  E03.9 244.9 TSH AND FREE T4      5. Vitamin D deficiency  E55.9 268.9       6. Stage 3 chronic kidney disease, unspecified whether stage 3a or 3b CKD ([facility] [address]  N18.30 585. 3         BP controlled  Blood sugar is actually fine but slowly creeping up  She is still struggling with some depression and anxiety over her 's death. She also will likely be moving later this year to the [facility].   F/u 3-4 months for med recheck --lexapro

## 2023-01-24 NOTE — PROGRESS NOTES
1. \"Have you been to the ER, urgent care clinic since your last visit? Hospitalized since your last visit? \" No    2. \"Have you seen or consulted any other health care providers outside of the [address] [place] [address] since your last visit? \" Yes [address] and ENT 2023      3. For patients aged 39-70: Has the patient had a colonoscopy / FIT/ Cologuard? NA - based on age      If the patient is female:    4. For patients aged 41-77: Has the patient had a mammogram within the past 2 years? NA - based on age or sex      11. For patients aged 21-65: Has the patient had a pap smear?  NA - based on age or sex

## 2023-04-08 DIAGNOSIS — F43.21 ADJUSTMENT DISORDER WITH DEPRESSED MOOD: ICD-10-CM

## 2023-04-08 DIAGNOSIS — F32.89 OTHER SPECIFIED DEPRESSIVE EPISODES: ICD-10-CM

## 2023-04-08 RX ORDER — ESCITALOPRAM OXALATE 10 MG/1
TABLET ORAL
Qty: 90 TABLET | Refills: 2 | Status: SHIPPED | OUTPATIENT
Start: 2023-04-08

## 2023-04-25 ENCOUNTER — HOSPITAL ENCOUNTER (OUTPATIENT)
Facility: HOSPITAL | Age: 78
Setting detail: SPECIMEN
Discharge: HOME | End: 2023-04-28
Payer: MEDICARE

## 2023-04-25 ENCOUNTER — OFFICE VISIT (OUTPATIENT)
Facility: CLINIC | Age: 78
End: 2023-04-25
Payer: MEDICARE

## 2023-04-25 VITALS
WEIGHT: 159 LBS | TEMPERATURE: 96.9 F | OXYGEN SATURATION: 99 % | BODY MASS INDEX: 27.14 KG/M2 | SYSTOLIC BLOOD PRESSURE: 121 MMHG | HEART RATE: 54 BPM | RESPIRATION RATE: 16 BRPM | DIASTOLIC BLOOD PRESSURE: 65 MMHG | HEIGHT: 64 IN

## 2023-04-25 DIAGNOSIS — E11.21 TYPE 2 DIABETES MELLITUS WITH DIABETIC NEPHROPATHY, WITHOUT LONG-TERM CURRENT USE OF INSULIN (HCC): ICD-10-CM

## 2023-04-25 DIAGNOSIS — I10 ESSENTIAL (PRIMARY) HYPERTENSION: ICD-10-CM

## 2023-04-25 DIAGNOSIS — E55.9 VITAMIN D DEFICIENCY, UNSPECIFIED: ICD-10-CM

## 2023-04-25 DIAGNOSIS — Z23 ENCOUNTER FOR IMMUNIZATION: ICD-10-CM

## 2023-04-25 DIAGNOSIS — I10 ESSENTIAL (PRIMARY) HYPERTENSION: Primary | ICD-10-CM

## 2023-04-25 DIAGNOSIS — F33.0 MAJOR DEPRESSIVE DISORDER, RECURRENT, MILD (HCC): ICD-10-CM

## 2023-04-25 DIAGNOSIS — E03.9 HYPOTHYROIDISM, UNSPECIFIED TYPE: ICD-10-CM

## 2023-04-25 LAB
25(OH)D3 SERPL-MCNC: 44.4 NG/ML (ref 30–100)
ALBUMIN SERPL-MCNC: 4.1 G/DL (ref 3.4–5)
ALBUMIN/GLOB SERPL: 1.3 (ref 0.8–1.7)
ALP SERPL-CCNC: 46 U/L (ref 45–117)
ALT SERPL-CCNC: 25 U/L (ref 13–56)
ANION GAP SERPL CALC-SCNC: 4 MMOL/L (ref 3–18)
AST SERPL-CCNC: 17 U/L (ref 10–38)
BILIRUB SERPL-MCNC: 0.6 MG/DL (ref 0.2–1)
BUN SERPL-MCNC: 16 MG/DL (ref 7–18)
BUN/CREAT SERPL: 15 (ref 12–20)
CALCIUM SERPL-MCNC: 9.6 MG/DL (ref 8.5–10.1)
CHLORIDE SERPL-SCNC: 110 MMOL/L (ref 100–111)
CHOLEST SERPL-MCNC: 165 MG/DL
CO2 SERPL-SCNC: 29 MMOL/L (ref 21–32)
CREAT SERPL-MCNC: 1.09 MG/DL (ref 0.6–1.3)
EST. AVERAGE GLUCOSE BLD GHB EST-MCNC: 123 MG/DL
GLOBULIN SER CALC-MCNC: 3.2 G/DL (ref 2–4)
GLUCOSE SERPL-MCNC: 61 MG/DL (ref 74–99)
HBA1C MFR BLD: 5.9 % (ref 4.2–5.6)
HDLC SERPL-MCNC: 54 MG/DL (ref 40–60)
HDLC SERPL: 3.1 (ref 0–5)
LDLC SERPL CALC-MCNC: 86.4 MG/DL (ref 0–100)
LIPID PANEL: NORMAL
POTASSIUM SERPL-SCNC: 4.1 MMOL/L (ref 3.5–5.5)
PROT SERPL-MCNC: 7.3 G/DL (ref 6.4–8.2)
SODIUM SERPL-SCNC: 143 MMOL/L (ref 136–145)
T4 FREE SERPL-MCNC: 1.4 NG/DL (ref 0.7–1.5)
TRIGL SERPL-MCNC: 123 MG/DL
TSH SERPL DL<=0.05 MIU/L-ACNC: 0.96 UIU/ML (ref 0.36–3.74)
VLDLC SERPL CALC-MCNC: 24.6 MG/DL

## 2023-04-25 PROCEDURE — 1036F TOBACCO NON-USER: CPT | Performed by: INTERNAL MEDICINE

## 2023-04-25 PROCEDURE — 99214 OFFICE O/P EST MOD 30 MIN: CPT | Performed by: INTERNAL MEDICINE

## 2023-04-25 PROCEDURE — 80061 LIPID PANEL: CPT

## 2023-04-25 PROCEDURE — 3074F SYST BP LT 130 MM HG: CPT | Performed by: INTERNAL MEDICINE

## 2023-04-25 PROCEDURE — 1090F PRES/ABSN URINE INCON ASSESS: CPT | Performed by: INTERNAL MEDICINE

## 2023-04-25 PROCEDURE — G8427 DOCREV CUR MEDS BY ELIG CLIN: HCPCS | Performed by: INTERNAL MEDICINE

## 2023-04-25 PROCEDURE — 80053 COMPREHEN METABOLIC PANEL: CPT

## 2023-04-25 PROCEDURE — 90471 IMMUNIZATION ADMIN: CPT | Performed by: INTERNAL MEDICINE

## 2023-04-25 PROCEDURE — 3078F DIAST BP <80 MM HG: CPT | Performed by: INTERNAL MEDICINE

## 2023-04-25 PROCEDURE — 36415 COLL VENOUS BLD VENIPUNCTURE: CPT

## 2023-04-25 PROCEDURE — 84443 ASSAY THYROID STIM HORMONE: CPT

## 2023-04-25 PROCEDURE — 82306 VITAMIN D 25 HYDROXY: CPT

## 2023-04-25 PROCEDURE — G8400 PT W/DXA NO RESULTS DOC: HCPCS | Performed by: INTERNAL MEDICINE

## 2023-04-25 PROCEDURE — 84439 ASSAY OF FREE THYROXINE: CPT

## 2023-04-25 PROCEDURE — 90750 HZV VACC RECOMBINANT IM: CPT | Performed by: INTERNAL MEDICINE

## 2023-04-25 PROCEDURE — 3044F HG A1C LEVEL LT 7.0%: CPT | Performed by: INTERNAL MEDICINE

## 2023-04-25 PROCEDURE — 83036 HEMOGLOBIN GLYCOSYLATED A1C: CPT

## 2023-04-25 PROCEDURE — 1123F ACP DISCUSS/DSCN MKR DOCD: CPT | Performed by: INTERNAL MEDICINE

## 2023-04-25 PROCEDURE — G8417 CALC BMI ABV UP PARAM F/U: HCPCS | Performed by: INTERNAL MEDICINE

## 2023-04-25 RX ORDER — OMEPRAZOLE 40 MG/1
1 CAPSULE, DELAYED RELEASE ORAL DAILY
COMMUNITY
Start: 2023-04-17

## 2023-04-25 SDOH — ECONOMIC STABILITY: FOOD INSECURITY: WITHIN THE PAST 12 MONTHS, THE FOOD YOU BOUGHT JUST DIDN'T LAST AND YOU DIDN'T HAVE MONEY TO GET MORE.: NEVER TRUE

## 2023-04-25 SDOH — ECONOMIC STABILITY: HOUSING INSECURITY
IN THE LAST 12 MONTHS, WAS THERE A TIME WHEN YOU DID NOT HAVE A STEADY PLACE TO SLEEP OR SLEPT IN A SHELTER (INCLUDING NOW)?: NO

## 2023-04-25 SDOH — ECONOMIC STABILITY: INCOME INSECURITY: HOW HARD IS IT FOR YOU TO PAY FOR THE VERY BASICS LIKE FOOD, HOUSING, MEDICAL CARE, AND HEATING?: NOT HARD AT ALL

## 2023-04-25 SDOH — ECONOMIC STABILITY: FOOD INSECURITY: WITHIN THE PAST 12 MONTHS, YOU WORRIED THAT YOUR FOOD WOULD RUN OUT BEFORE YOU GOT MONEY TO BUY MORE.: NEVER TRUE

## 2023-04-25 ASSESSMENT — PATIENT HEALTH QUESTIONNAIRE - PHQ9
SUM OF ALL RESPONSES TO PHQ QUESTIONS 1-9: 1
SUM OF ALL RESPONSES TO PHQ9 QUESTIONS 1 & 2: 1
2. FEELING DOWN, DEPRESSED OR HOPELESS: 1
SUM OF ALL RESPONSES TO PHQ QUESTIONS 1-9: 1
3. TROUBLE FALLING OR STAYING ASLEEP: 0
5. POOR APPETITE OR OVEREATING: 0
6. FEELING BAD ABOUT YOURSELF - OR THAT YOU ARE A FAILURE OR HAVE LET YOURSELF OR YOUR FAMILY DOWN: 0
SUM OF ALL RESPONSES TO PHQ QUESTIONS 1-9: 1
9. THOUGHTS THAT YOU WOULD BE BETTER OFF DEAD, OR OF HURTING YOURSELF: 0
7. TROUBLE CONCENTRATING ON THINGS, SUCH AS READING THE NEWSPAPER OR WATCHING TELEVISION: 0
SUM OF ALL RESPONSES TO PHQ QUESTIONS 1-9: 1
8. MOVING OR SPEAKING SO SLOWLY THAT OTHER PEOPLE COULD HAVE NOTICED. OR THE OPPOSITE, BEING SO FIGETY OR RESTLESS THAT YOU HAVE BEEN MOVING AROUND A LOT MORE THAN USUAL: 0
10. IF YOU CHECKED OFF ANY PROBLEMS, HOW DIFFICULT HAVE THESE PROBLEMS MADE IT FOR YOU TO DO YOUR WORK, TAKE CARE OF THINGS AT HOME, OR GET ALONG WITH OTHER PEOPLE: 1
4. FEELING TIRED OR HAVING LITTLE ENERGY: 0
1. LITTLE INTEREST OR PLEASURE IN DOING THINGS: 0

## 2023-04-25 ASSESSMENT — ENCOUNTER SYMPTOMS: RESPIRATORY NEGATIVE: 1

## 2023-04-25 NOTE — PROGRESS NOTES
1. \"Have you been to the ER, urgent care clinic since your last visit? Hospitalized since your last visit? \" No    2. \"Have you seen or consulted any other health care providers outside of the [address] [place] [address] since your last visit? \" Yes ENT      3. For patients aged 39-70: Has the patient had a colonoscopy / FIT/ Cologuard? NA - based on age      If the patient is female:    4. For patients aged 41-77: Has the patient had a mammogram within the past 2 years? NA - based on age or sex      11. For patients aged 21-65: Has the patient had a pap smear?  NA - based on age or sex

## 2023-04-25 NOTE — PROGRESS NOTES
HISTORY OF PRESENT ILLNESS      Aliya Lady is a 65 y.o. female. /65 (Site: Left Upper Arm, Position: Sitting, Cuff Size: Medium Adult)   Pulse 54   Temp 96.9 °F (36.1 °C) (Temporal)   Resp 16   Ht 5' 4\" (1.626 m)   Wt 159 lb (72.1 kg)   SpO2 99%   BMI 27.29 kg/m²         Will be moving in August to [facility] to live with her daughter  She has already made some plans to get involved in the community              Current Outpatient Medications:   ·  omeprazole (PRILOSEC) 40 MG delayed release capsule, Take 1 capsule by mouth daily, Disp: , Rfl:   ·  escitalopram (LEXAPRO) 10 MG tablet, TAKE 1 TAB BY MOUTH DAILY. INDICATIONS: GRIEF AND DEPRESSION, Disp: 90 tablet, Rfl: 2  ·  BABY ASPIRIN PO, Take 81 mg by mouth, Disp: , Rfl:   ·  albuterol sulfate HFA (PROVENTIL;VENTOLIN;PROAIR) 108 (90 Base) MCG/ACT inhaler, Inhale 1 puff into the lungs every 4 hours as needed, Disp: , Rfl:   ·  ammonium lactate (LAC-HYDRIN) 12 % lotion, Use 1 Application to affected area Take As Needed. , Disp: , Rfl:   ·  diclofenac sodium (VOLTAREN) 1 % GEL, APPLY QUARTER SIZED AMOUNT TO AFFECTED AREA(S) 3 TIMES DAILY, Disp: , Rfl:   ·  ergocalciferol (ERGOCALCIFEROL) 1.25 MG (95737 UT) capsule, Take 1 capsule by mouth every 30 days, Disp: , Rfl:   ·  hydrocortisone 2.5 % ointment, APPLY TO AFFECTED AREAS DAILY AS DIRECTED WITH KETOCONAZOLE CREAM, Disp: , Rfl:   ·  ketoconazole (NIZORAL) 2 % cream, APPLY A THIN LAYER TO AFFECTED AREA TWICE A DAY, Disp: , Rfl:   ·  levothyroxine (SYNTHROID) 100 MCG tablet, TAKE 1 TABLET BY MOUTH ONCE DAILY FOR HYPOTHYROIDISM, Disp: , Rfl:   ·  loratadine (CLARITIN) 10 MG tablet, Take 1 tablet by mouth daily, Disp: , Rfl:   ·  metoprolol tartrate (LOPRESSOR) 50 MG tablet, TAKE 1 TABLET BY MOUTH EVERY DAY, Disp: , Rfl:   ·  simvastatin (ZOCOR) 40 MG tablet, TAKE 1 TABLET BY MOUTH NIGHTLY, Disp: , Rfl:   ·  spironolactone (ALDACTONE) 50 MG tablet, TAKE 1 TABLET BY MOUTH EVERY DAY, Disp: , Rfl:

## 2023-04-25 NOTE — PROGRESS NOTES
Hali Ventura is a 65 y.o. female who presents for Shingrix #2 immunization. she denies any symptoms , reactions or allergies that would exclude them from being immunized today. Risks and adverse reactions were discussed and the VIS was given to them. All questions were addressed. she was observed for 15 min post injection. There were no reactions observed.     Tammy Perez LPN

## 2023-04-28 ENCOUNTER — TELEPHONE (OUTPATIENT)
Facility: CLINIC | Age: 78
End: 2023-04-28

## 2023-05-02 RX ORDER — LEVOTHYROXINE SODIUM 100 MCG
TABLET ORAL
Qty: 90 TABLET | Refills: 3 | Status: SHIPPED | OUTPATIENT
Start: 2023-05-02

## 2023-05-26 ENCOUNTER — OFFICE VISIT (OUTPATIENT)
Facility: CLINIC | Age: 78
End: 2023-05-26
Payer: MEDICARE

## 2023-05-26 VITALS
HEART RATE: 53 BPM | BODY MASS INDEX: 27.14 KG/M2 | OXYGEN SATURATION: 98 % | WEIGHT: 159 LBS | RESPIRATION RATE: 16 BRPM | DIASTOLIC BLOOD PRESSURE: 66 MMHG | TEMPERATURE: 96.8 F | SYSTOLIC BLOOD PRESSURE: 137 MMHG | HEIGHT: 64 IN

## 2023-05-26 DIAGNOSIS — R55 VASOVAGAL SYNCOPE: Primary | ICD-10-CM

## 2023-05-26 PROCEDURE — G8428 CUR MEDS NOT DOCUMENT: HCPCS | Performed by: INTERNAL MEDICINE

## 2023-05-26 PROCEDURE — 1090F PRES/ABSN URINE INCON ASSESS: CPT | Performed by: INTERNAL MEDICINE

## 2023-05-26 PROCEDURE — G8417 CALC BMI ABV UP PARAM F/U: HCPCS | Performed by: INTERNAL MEDICINE

## 2023-05-26 PROCEDURE — 99213 OFFICE O/P EST LOW 20 MIN: CPT | Performed by: INTERNAL MEDICINE

## 2023-05-26 PROCEDURE — 1036F TOBACCO NON-USER: CPT | Performed by: INTERNAL MEDICINE

## 2023-05-26 PROCEDURE — 1123F ACP DISCUSS/DSCN MKR DOCD: CPT | Performed by: INTERNAL MEDICINE

## 2023-05-26 PROCEDURE — G8399 PT W/DXA RESULTS DOCUMENT: HCPCS | Performed by: INTERNAL MEDICINE

## 2023-05-26 NOTE — PROGRESS NOTES
1. \"Have you been to the ER, urgent care clinic since your last visit? Hospitalized since your last visit? \" Yes [facility] for Syncope 5/20/23    2. \"Have you seen or consulted any other health care providers outside of the [address] [place] [address] since your last visit? \" Yes ENT 5/25/23 for vertigo/eardrum dysfunction. Dermatology and Orthopedic      3. For patients aged 39-70: Has the patient had a colonoscopy / FIT/ Cologuard? NA - based on age      If the patient is female:    4. For patients aged 41-77: Has the patient had a mammogram within the past 2 years? NA - based on age or sex      11. For patients aged 21-65: Has the patient had a pap smear?  NA - based on age or sex

## 2023-06-27 DIAGNOSIS — Z76.0 ENCOUNTER FOR ISSUE OF REPEAT PRESCRIPTION: ICD-10-CM

## 2023-06-27 RX ORDER — ERGOCALCIFEROL 1.25 MG/1
CAPSULE ORAL
Qty: 3 CAPSULE | Refills: 3 | Status: SHIPPED | OUTPATIENT
Start: 2023-06-27

## 2023-07-10 RX ORDER — SIMVASTATIN 40 MG
TABLET ORAL
Qty: 90 TABLET | Refills: 2 | Status: SHIPPED | OUTPATIENT
Start: 2023-07-10

## 2023-08-03 ENCOUNTER — OFFICE VISIT (OUTPATIENT)
Facility: CLINIC | Age: 78
End: 2023-08-03
Payer: MEDICARE

## 2023-08-03 ENCOUNTER — HOSPITAL ENCOUNTER (OUTPATIENT)
Facility: HOSPITAL | Age: 78
Setting detail: SPECIMEN
Discharge: HOME | End: 2023-08-03
Payer: MEDICARE

## 2023-08-03 VITALS
WEIGHT: 155 LBS | HEIGHT: 64 IN | RESPIRATION RATE: 17 BRPM | BODY MASS INDEX: 26.46 KG/M2 | DIASTOLIC BLOOD PRESSURE: 81 MMHG | OXYGEN SATURATION: 97 % | SYSTOLIC BLOOD PRESSURE: 134 MMHG | HEART RATE: 56 BPM | TEMPERATURE: 97 F

## 2023-08-03 DIAGNOSIS — J01.90 SUBACUTE SINUSITIS, UNSPECIFIED LOCATION: ICD-10-CM

## 2023-08-03 DIAGNOSIS — E11.21 TYPE 2 DIABETES MELLITUS WITH DIABETIC NEPHROPATHY, WITHOUT LONG-TERM CURRENT USE OF INSULIN (HCC): ICD-10-CM

## 2023-08-03 DIAGNOSIS — F43.21 ADJUSTMENT DISORDER WITH DEPRESSED MOOD: ICD-10-CM

## 2023-08-03 DIAGNOSIS — E03.9 HYPOTHYROIDISM, UNSPECIFIED TYPE: ICD-10-CM

## 2023-08-03 DIAGNOSIS — I10 ESSENTIAL (PRIMARY) HYPERTENSION: ICD-10-CM

## 2023-08-03 DIAGNOSIS — Z00.00 MEDICARE ANNUAL WELLNESS VISIT, SUBSEQUENT: Primary | ICD-10-CM

## 2023-08-03 DIAGNOSIS — E55.9 VITAMIN D DEFICIENCY, UNSPECIFIED: ICD-10-CM

## 2023-08-03 DIAGNOSIS — F32.89 OTHER SPECIFIED DEPRESSIVE EPISODES: ICD-10-CM

## 2023-08-03 LAB
ALBUMIN SERPL-MCNC: 3.7 G/DL (ref 3.4–5)
ALBUMIN/GLOB SERPL: 1 (ref 0.8–1.7)
ALP SERPL-CCNC: 48 U/L (ref 45–117)
ALT SERPL-CCNC: 50 U/L (ref 13–56)
ANION GAP SERPL CALC-SCNC: 7 MMOL/L (ref 3–18)
AST SERPL-CCNC: 30 U/L (ref 10–38)
BILIRUB SERPL-MCNC: 0.5 MG/DL (ref 0.2–1)
BUN SERPL-MCNC: 16 MG/DL (ref 7–18)
BUN/CREAT SERPL: 13 (ref 12–20)
CALCIUM SERPL-MCNC: 9.3 MG/DL (ref 8.5–10.1)
CHLORIDE SERPL-SCNC: 106 MMOL/L (ref 100–111)
CHOLEST SERPL-MCNC: 237 MG/DL
CO2 SERPL-SCNC: 27 MMOL/L (ref 21–32)
CREAT SERPL-MCNC: 1.19 MG/DL (ref 0.6–1.3)
CREAT UR-MCNC: 58 MG/DL (ref 30–125)
EST. AVERAGE GLUCOSE BLD GHB EST-MCNC: 123 MG/DL
GLOBULIN SER CALC-MCNC: 3.7 G/DL (ref 2–4)
GLUCOSE SERPL-MCNC: 61 MG/DL (ref 74–99)
HBA1C MFR BLD: 5.9 % (ref 4.2–5.6)
HDLC SERPL-MCNC: 54 MG/DL (ref 40–60)
HDLC SERPL: 4.4 (ref 0–5)
LDLC SERPL CALC-MCNC: 153.8 MG/DL (ref 0–100)
LIPID PANEL: ABNORMAL
MICROALBUMIN UR-MCNC: <0.5 MG/DL (ref 0–3)
MICROALBUMIN/CREAT UR-RTO: NORMAL MG/G (ref 0–30)
POTASSIUM SERPL-SCNC: 4.2 MMOL/L (ref 3.5–5.5)
PROT SERPL-MCNC: 7.4 G/DL (ref 6.4–8.2)
SODIUM SERPL-SCNC: 140 MMOL/L (ref 136–145)
T4 FREE SERPL-MCNC: 1.2 NG/DL (ref 0.7–1.5)
TRIGL SERPL-MCNC: 146 MG/DL
TSH SERPL DL<=0.05 MIU/L-ACNC: 1.99 UIU/ML (ref 0.36–3.74)
VLDLC SERPL CALC-MCNC: 29.2 MG/DL

## 2023-08-03 PROCEDURE — G8427 DOCREV CUR MEDS BY ELIG CLIN: HCPCS | Performed by: INTERNAL MEDICINE

## 2023-08-03 PROCEDURE — 1123F ACP DISCUSS/DSCN MKR DOCD: CPT | Performed by: INTERNAL MEDICINE

## 2023-08-03 PROCEDURE — 1090F PRES/ABSN URINE INCON ASSESS: CPT | Performed by: INTERNAL MEDICINE

## 2023-08-03 PROCEDURE — 80061 LIPID PANEL: CPT

## 2023-08-03 PROCEDURE — 3044F HG A1C LEVEL LT 7.0%: CPT | Performed by: INTERNAL MEDICINE

## 2023-08-03 PROCEDURE — 84443 ASSAY THYROID STIM HORMONE: CPT

## 2023-08-03 PROCEDURE — 82570 ASSAY OF URINE CREATININE: CPT

## 2023-08-03 PROCEDURE — G8417 CALC BMI ABV UP PARAM F/U: HCPCS | Performed by: INTERNAL MEDICINE

## 2023-08-03 PROCEDURE — 82043 UR ALBUMIN QUANTITATIVE: CPT

## 2023-08-03 PROCEDURE — G0439 PPPS, SUBSEQ VISIT: HCPCS | Performed by: INTERNAL MEDICINE

## 2023-08-03 PROCEDURE — 36415 COLL VENOUS BLD VENIPUNCTURE: CPT

## 2023-08-03 PROCEDURE — 80053 COMPREHEN METABOLIC PANEL: CPT

## 2023-08-03 PROCEDURE — G8399 PT W/DXA RESULTS DOCUMENT: HCPCS | Performed by: INTERNAL MEDICINE

## 2023-08-03 PROCEDURE — 84439 ASSAY OF FREE THYROXINE: CPT

## 2023-08-03 PROCEDURE — 1036F TOBACCO NON-USER: CPT | Performed by: INTERNAL MEDICINE

## 2023-08-03 PROCEDURE — 3079F DIAST BP 80-89 MM HG: CPT | Performed by: INTERNAL MEDICINE

## 2023-08-03 PROCEDURE — 82306 VITAMIN D 25 HYDROXY: CPT

## 2023-08-03 PROCEDURE — 3075F SYST BP GE 130 - 139MM HG: CPT | Performed by: INTERNAL MEDICINE

## 2023-08-03 PROCEDURE — 83036 HEMOGLOBIN GLYCOSYLATED A1C: CPT

## 2023-08-03 PROCEDURE — 99214 OFFICE O/P EST MOD 30 MIN: CPT | Performed by: INTERNAL MEDICINE

## 2023-08-03 RX ORDER — ALBUTEROL SULFATE 90 UG/1
1 AEROSOL, METERED RESPIRATORY (INHALATION) EVERY 4 HOURS PRN
Qty: 18 G | Refills: 5 | Status: SHIPPED | OUTPATIENT
Start: 2023-08-03

## 2023-08-03 RX ORDER — AMMONIUM LACTATE 12 G/100G
LOTION TOPICAL DAILY
Qty: 222 ML | Refills: 5 | Status: SHIPPED | OUTPATIENT
Start: 2023-08-03

## 2023-08-03 RX ORDER — ESCITALOPRAM OXALATE 10 MG/1
TABLET ORAL
Qty: 90 TABLET | Refills: 3 | Status: SHIPPED | OUTPATIENT
Start: 2023-08-03

## 2023-08-03 RX ORDER — SPIRONOLACTONE 50 MG/1
50 TABLET, FILM COATED ORAL DAILY
Qty: 90 TABLET | Refills: 3 | Status: SHIPPED | OUTPATIENT
Start: 2023-08-03

## 2023-08-03 RX ORDER — LEVOTHYROXINE SODIUM 100 MCG
TABLET ORAL
Qty: 90 TABLET | Refills: 3 | Status: SHIPPED | OUTPATIENT
Start: 2023-08-03

## 2023-08-03 RX ORDER — DOXYCYCLINE HYCLATE 100 MG/1
100 CAPSULE ORAL 2 TIMES DAILY
Qty: 14 CAPSULE | Refills: 0 | Status: SHIPPED | OUTPATIENT
Start: 2023-08-03 | End: 2023-08-10

## 2023-08-03 SDOH — ECONOMIC STABILITY: INCOME INSECURITY: HOW HARD IS IT FOR YOU TO PAY FOR THE VERY BASICS LIKE FOOD, HOUSING, MEDICAL CARE, AND HEATING?: NOT HARD AT ALL

## 2023-08-03 SDOH — ECONOMIC STABILITY: FOOD INSECURITY: WITHIN THE PAST 12 MONTHS, YOU WORRIED THAT YOUR FOOD WOULD RUN OUT BEFORE YOU GOT MONEY TO BUY MORE.: NEVER TRUE

## 2023-08-03 SDOH — ECONOMIC STABILITY: FOOD INSECURITY: WITHIN THE PAST 12 MONTHS, THE FOOD YOU BOUGHT JUST DIDN'T LAST AND YOU DIDN'T HAVE MONEY TO GET MORE.: NEVER TRUE

## 2023-08-03 ASSESSMENT — PATIENT HEALTH QUESTIONNAIRE - PHQ9
9. THOUGHTS THAT YOU WOULD BE BETTER OFF DEAD, OR OF HURTING YOURSELF: 0
6. FEELING BAD ABOUT YOURSELF - OR THAT YOU ARE A FAILURE OR HAVE LET YOURSELF OR YOUR FAMILY DOWN: 0
5. POOR APPETITE OR OVEREATING: 0
10. IF YOU CHECKED OFF ANY PROBLEMS, HOW DIFFICULT HAVE THESE PROBLEMS MADE IT FOR YOU TO DO YOUR WORK, TAKE CARE OF THINGS AT HOME, OR GET ALONG WITH OTHER PEOPLE: 1
1. LITTLE INTEREST OR PLEASURE IN DOING THINGS: 1
SUM OF ALL RESPONSES TO PHQ9 QUESTIONS 1 & 2: 2
3. TROUBLE FALLING OR STAYING ASLEEP: 0
2. FEELING DOWN, DEPRESSED OR HOPELESS: 1
SUM OF ALL RESPONSES TO PHQ QUESTIONS 1-9: 2
SUM OF ALL RESPONSES TO PHQ QUESTIONS 1-9: 2
8. MOVING OR SPEAKING SO SLOWLY THAT OTHER PEOPLE COULD HAVE NOTICED. OR THE OPPOSITE, BEING SO FIGETY OR RESTLESS THAT YOU HAVE BEEN MOVING AROUND A LOT MORE THAN USUAL: 0
4. FEELING TIRED OR HAVING LITTLE ENERGY: 0
SUM OF ALL RESPONSES TO PHQ QUESTIONS 1-9: 2
SUM OF ALL RESPONSES TO PHQ QUESTIONS 1-9: 2
7. TROUBLE CONCENTRATING ON THINGS, SUCH AS READING THE NEWSPAPER OR WATCHING TELEVISION: 0

## 2023-08-03 ASSESSMENT — LIFESTYLE VARIABLES
HOW OFTEN DO YOU HAVE A DRINK CONTAINING ALCOHOL: NEVER
HOW MANY STANDARD DRINKS CONTAINING ALCOHOL DO YOU HAVE ON A TYPICAL DAY: PATIENT DOES NOT DRINK

## 2023-08-03 ASSESSMENT — ENCOUNTER SYMPTOMS
COUGH: 0
RHINORRHEA: 1
SINUS PRESSURE: 1

## 2023-08-03 NOTE — PROGRESS NOTES
Urban Talbot as [address] [facility], APRN - NP (Nurse Practitioner)     Reviewed and updated this visit:  Tobacco  Allergies  Meds  Med Hx  Surg Hx  Soc Hx  Fam Hx

## 2023-08-04 LAB — 25(OH)D3 SERPL-MCNC: 51.4 NG/ML (ref 30–100)

## 2024-07-08 RX ORDER — SIMVASTATIN 40 MG
40 TABLET ORAL NIGHTLY
Qty: 90 TABLET | Refills: 2 | OUTPATIENT
Start: 2024-07-08

## 2024-07-08 NOTE — TELEPHONE ENCOUNTER
Chart reviewed. Pt is no longer being followed at this practice. Pt's new PCP is Barbara Perez MD in [place], [place].

## 2024-07-22 DIAGNOSIS — Z76.0 ENCOUNTER FOR ISSUE OF REPEAT PRESCRIPTION: ICD-10-CM

## 2024-07-22 RX ORDER — ERGOCALCIFEROL 1.25 MG/1
CAPSULE, LIQUID FILLED ORAL
Qty: 3 CAPSULE | Refills: 3 | OUTPATIENT
Start: 2024-07-22

## 2024-07-22 RX ORDER — SIMVASTATIN 40 MG
40 TABLET ORAL NIGHTLY
Qty: 90 TABLET | Refills: 2 | OUTPATIENT
Start: 2024-07-22

## 2024-07-22 RX ORDER — SPIRONOLACTONE 50 MG/1
50 TABLET, FILM COATED ORAL DAILY
Qty: 90 TABLET | Refills: 3 | OUTPATIENT
Start: 2024-07-22

## 2024-07-22 RX ORDER — METOPROLOL TARTRATE 50 MG
TABLET ORAL
Qty: 90 TABLET | Refills: 2 | OUTPATIENT
Start: 2024-07-22

## 2024-07-23 RX ORDER — LEVOTHYROXINE SODIUM 100 MCG
TABLET ORAL
Qty: 90 TABLET | Refills: 2 | OUTPATIENT
Start: 2024-07-23

## 2024-07-30 RX ORDER — METOPROLOL TARTRATE 50 MG/1
TABLET, FILM COATED ORAL
Qty: 90 TABLET | Refills: 2 | Status: SHIPPED | OUTPATIENT
Start: 2024-07-30